# Patient Record
Sex: FEMALE | Race: WHITE | NOT HISPANIC OR LATINO | Employment: OTHER | URBAN - METROPOLITAN AREA
[De-identification: names, ages, dates, MRNs, and addresses within clinical notes are randomized per-mention and may not be internally consistent; named-entity substitution may affect disease eponyms.]

---

## 2018-08-27 ENCOUNTER — HOSPITAL ENCOUNTER (EMERGENCY)
Facility: MEDICAL CENTER | Age: 73
End: 2018-08-27
Attending: EMERGENCY MEDICINE
Payer: MEDICARE

## 2018-08-27 VITALS
SYSTOLIC BLOOD PRESSURE: 124 MMHG | BODY MASS INDEX: 31.5 KG/M2 | DIASTOLIC BLOOD PRESSURE: 52 MMHG | WEIGHT: 184.53 LBS | HEART RATE: 72 BPM | TEMPERATURE: 96.1 F | RESPIRATION RATE: 18 BRPM | OXYGEN SATURATION: 92 % | HEIGHT: 64 IN

## 2018-08-27 DIAGNOSIS — L03.116 CELLULITIS OF LEFT LOWER EXTREMITY: ICD-10-CM

## 2018-08-27 PROCEDURE — 700111 HCHG RX REV CODE 636 W/ 250 OVERRIDE (IP): Performed by: EMERGENCY MEDICINE

## 2018-08-27 PROCEDURE — 90715 TDAP VACCINE 7 YRS/> IM: CPT | Performed by: EMERGENCY MEDICINE

## 2018-08-27 PROCEDURE — 700102 HCHG RX REV CODE 250 W/ 637 OVERRIDE(OP): Performed by: EMERGENCY MEDICINE

## 2018-08-27 PROCEDURE — A9270 NON-COVERED ITEM OR SERVICE: HCPCS | Performed by: EMERGENCY MEDICINE

## 2018-08-27 PROCEDURE — 99284 EMERGENCY DEPT VISIT MOD MDM: CPT

## 2018-08-27 PROCEDURE — 90471 IMMUNIZATION ADMIN: CPT

## 2018-08-27 RX ORDER — AMOXICILLIN 500 MG/1
500 CAPSULE ORAL ONCE
Status: COMPLETED | OUTPATIENT
Start: 2018-08-27 | End: 2018-08-27

## 2018-08-27 RX ORDER — SULFAMETHOXAZOLE AND TRIMETHOPRIM 800; 160 MG/1; MG/1
2 TABLET ORAL ONCE
Status: COMPLETED | OUTPATIENT
Start: 2018-08-27 | End: 2018-08-27

## 2018-08-27 RX ORDER — AMOXICILLIN 500 MG/1
500 CAPSULE ORAL 3 TIMES DAILY
Qty: 21 CAP | Refills: 0 | Status: SHIPPED | OUTPATIENT
Start: 2018-08-27 | End: 2018-09-01

## 2018-08-27 RX ORDER — OMEPRAZOLE 20 MG/1
20 CAPSULE, DELAYED RELEASE ORAL DAILY
Status: SHIPPED | COMMUNITY
End: 2021-11-02 | Stop reason: SDUPTHER

## 2018-08-27 RX ORDER — SULFAMETHOXAZOLE AND TRIMETHOPRIM 800; 160 MG/1; MG/1
2 TABLET ORAL 2 TIMES DAILY
Qty: 14 TAB | Refills: 0 | Status: SHIPPED | OUTPATIENT
Start: 2018-08-27 | End: 2018-09-01

## 2018-08-27 RX ADMIN — AMOXICILLIN 500 MG: 500 CAPSULE ORAL at 13:06

## 2018-08-27 RX ADMIN — CLOSTRIDIUM TETANI TOXOID ANTIGEN (FORMALDEHYDE INACTIVATED), CORYNEBACTERIUM DIPHTHERIAE TOXOID ANTIGEN (FORMALDEHYDE INACTIVATED), BORDETELLA PERTUSSIS TOXOID ANTIGEN (GLUTARALDEHYDE INACTIVATED), BORDETELLA PERTUSSIS FILAMENTOUS HEMAGGLUTININ ANTIGEN (FORMALDEHYDE INACTIVATED), BORDETELLA PERTUSSIS PERTACTIN ANTIGEN, AND BORDETELLA PERTUSSIS FIMBRIAE 2/3 ANTIGEN 0.5 ML: 5; 2; 2.5; 5; 3; 5 INJECTION, SUSPENSION INTRAMUSCULAR at 13:06

## 2018-08-27 RX ADMIN — SULFAMETHOXAZOLE AND TRIMETHOPRIM 2 TABLET: 800; 160 TABLET ORAL at 13:06

## 2018-08-27 ASSESSMENT — LIFESTYLE VARIABLES: DO YOU DRINK ALCOHOL: NO

## 2018-08-27 NOTE — DISCHARGE INSTRUCTIONS
Cellulitis, Adult  Cellulitis is a skin infection. The infected area is usually red and tender. This condition occurs most often in the arms and lower legs. The infection can travel to the muscles, blood, and underlying tissue and become serious. It is very important to get treated for this condition.  What are the causes?  Cellulitis is caused by bacteria. The bacteria enter through a break in the skin, such as a cut, burn, insect bite, open sore, or crack.  What increases the risk?  This condition is more likely to occur in people who:  · Have a weak defense system (immune system).  · Have open wounds on the skin such as cuts, burns, bites, and scrapes. Bacteria can enter the body through these open wounds.  · Are older.  · Have diabetes.  · Have a type of long-lasting (chronic) liver disease (cirrhosis) or kidney disease.  · Use IV drugs.  What are the signs or symptoms?  Symptoms of this condition include:  · Redness, streaking, or spotting on the skin.  · Swollen area of the skin.  · Tenderness or pain when an area of the skin is touched.  · Warm skin.  · Fever.  · Chills.  · Blisters.  How is this diagnosed?  This condition is diagnosed based on a medical history and physical exam. You may also have tests, including:  · Blood tests.  · Lab tests.  · Imaging tests.  How is this treated?  Treatment for this condition may include:  · Medicines, such as antibiotic medicines or antihistamines.  · Supportive care, such as rest and application of cold or warm cloths (cold or warm compresses) to the skin.  · Hospital care, if the condition is severe.  The infection usually gets better within 1-2 days of treatment.  Follow these instructions at home:  · Take over-the-counter and prescription medicines only as told by your health care provider.  · If you were prescribed an antibiotic medicine, take it as told by your health care provider. Do not stop taking the antibiotic even if you start to feel better.  · Drink  enough fluid to keep your urine clear or pale yellow.  · Do not touch or rub the infected area.  · Raise (elevate) the infected area above the level of your heart while you are sitting or lying down.  · Apply warm or cold compresses to the affected area as told by your health care provider.  · Keep all follow-up visits as told by your health care provider. This is important. These visits let your health care provider make sure a more serious infection is not developing.  Contact a health care provider if:  · You have a fever.  · Your symptoms do not improve within 1-2 days of starting treatment.  · Your bone or joint underneath the infected area becomes painful after the skin has healed.  · Your infection returns in the same area or another area.  · You notice a swollen bump in the infected area.  · You develop new symptoms.  · You have a general ill feeling (malaise) with muscle aches and pains.  Get help right away if:  · Your symptoms get worse.  · You feel very sleepy.  · You develop vomiting or diarrhea that persists.  · You notice red streaks coming from the infected area.  · Your red area gets larger or turns dark in color.  This information is not intended to replace advice given to you by your health care provider. Make sure you discuss any questions you have with your health care provider.  Document Released: 09/27/2006 Document Revised: 04/27/2017 Document Reviewed: 10/26/2016  [employer] Interactive Patient Education © 2017 [employer]

## 2018-08-27 NOTE — ED NOTES
Pt ambulates to triage with  with c/c possible wound infection to left ankle.  Pt reports skin biopsy being done on Thursday.  A&ox4.  Pt to lobby & advised to inform RN of any changes.    *Pt here on vacation from [place].

## 2018-08-27 NOTE — ED TRIAGE NOTES
Ambulatory to room, agree with triage note. Visiting from [place], on Thursday she had lesion removed from left lateral ankle. She has ulcerated wound with some purulent material at edges with surrounding swelling and erythema. Does not extend proximally into leg. Denies fever/chills. Chart up for ERP.

## 2018-08-27 NOTE — ED NOTES
Verbalizes understanding of discharge and followup instructions. VSS. Given Rx's x2. Ambulates with steady gait to discharge.

## 2018-08-27 NOTE — ED PROVIDER NOTES
"ED Provider Note  CHIEF COMPLAINT  Chief Complaint   Patient presents with   • Wound Infection     possible wound infection, left ankle, skin biopsy done Thursday in [facility]  Jackie Johnson is a 73 y.o. female who presents stating that she had a squamous cell lesion biopsied and removed 4 days prior and since that she's had slight swelling and redness. Denies discharge from the wound as a neuro lateral aspect of her left ankle. She denies fever, shakes, chills, sweats, significant fever, or discharge. Her tetanus is is not current.    REVIEW OF SYSTEMS  Pertinent positives include slight swelling and redness the lateral aspect of her left lateral malleolus  Pertinent negatives include no discharge, no fever, no significant pain, no joint pain, no difficulty walking     PAST MEDICAL HISTORY  Past Medical History:   Diagnosis Date   • Dysrhythmia    • Hypertension        FAMILY HISTORY  History reviewed. No pertinent family history.    SOCIAL HISTORY  Social History     Social History   • Marital status:      Spouse name: N/A   • Number of children: N/A   • Years of education: N/A     Social History Main Topics   • Smoking status: Never Smoker   • Smokeless tobacco: Never Used   • Alcohol use Yes      Comment: occ   • Drug use: No   • Sexual activity: Not on file     Other Topics Concern   • Not on file     Social History Narrative   • No narrative on file       SURGICAL HISTORY  Past Surgical History:   Procedure Laterality Date   • OTHER ORTHOPEDIC SURGERY Bilateral     knee replacements       CURRENT MEDICATIONS  Home Medications     Reviewed by Sarbjit Pillai R.N. (Registered Nurse) on 08/27/18 at 1223  Med List Status: Partial   Medication Last Dose Status   ATENOLOL PO  Active   omeprazole (PRILOSEC) 20 MG delayed-release capsule  Active                ALLERGIES  Allergies   Allergen Reactions   • Other Drug      \"All narcotics make me nauseated\"       PHYSICAL EXAM  VITAL SIGNS: /52 " "  Pulse 72   Temp (!) 35.6 °C (96.1 °F)   Resp 18   Ht 1.626 m (5' 4\")   Wt 83.7 kg (184 lb 8.4 oz)   SpO2 92%   BMI 31.67 kg/m²      Constitutional: Well developed, Well nourished, No acute distress, Non-toxic appearance.   Eyes: PERRLA, EOMI, Conjunctiva normal, No discharge.   Skin: Warm,1 cm ulceration to the lateral malleolus region, no bony involvement, slight surrounding erythema and edema approximately 2 cm in diameter, no discharge, fluctuance, nontender  Back: No midline thoracic and lumbar spine tenderness, No CVA tenderness.   Extremities:  No edema,no obvious deformity, findings as above, distal pulses are brisk, excellent range of motion of the left ankle joint   Neurologic: Alert & oriented x 3, No focal deficits noted, either flexion dorsiflexion 5/5 left lower extremity        COURSE & MEDICAL DECISION MAKING  Pertinent Labs & Imaging studies reviewed. (See chart for details)  This is a 73-year-old female presents with status post biopsy of a squamous lesion on the left lateral ankle/malleolus. There is slight cellulitis or notes of abscess. Tetanus booster is updated, she received amoxicillin and Bactrim here in the emergency department prescription for the same. Strict return precautions have been given. Currently she does not have a surgical condition/lesion.    Discharge Medication List as of 8/27/2018  1:13 PM      START taking these medications    Details   amoxicillin (AMOXIL) 500 MG Cap Take 1 Cap by mouth 3 times a day for 5 days., Disp-21 Cap, R-0, Print Rx Paper      sulfamethoxazole-trimethoprim (BACTRIM DS) 800-160 MG tablet Take 2 Tabs by mouth 2 times a day for 5 days., Disp-14 Tab, R-0, Print Rx Paper               FINAL IMPRESSION     1. Cellulitis of left lower extremity        DISPOSITION:  Patient will be discharged home in stable condition.    FOLLOW UP:  [facility], Emergency Dept  [address] [place] [id number] [phone]    If symptoms " worsen      OUTPATIENT MEDICATIONS:  Discharge Medication List as of 8/27/2018  1:13 PM      START taking these medications    Details   amoxicillin (AMOXIL) 500 MG Cap Take 1 Cap by mouth 3 times a day for 5 days., Disp-21 Cap, R-0, Print Rx Paper      sulfamethoxazole-trimethoprim (BACTRIM DS) 800-160 MG tablet Take 2 Tabs by mouth 2 times a day for 5 days., Disp-14 Tab, R-0, Print Rx Paper                 Electronically signed by: John Thorpe, 8/27/2018 8:18 PM

## 2021-09-27 ENCOUNTER — OFFICE VISIT (OUTPATIENT)
Dept: MEDICAL GROUP | Facility: LAB | Age: 76
End: 2021-09-27
Payer: COMMERCIAL

## 2021-09-27 VITALS
WEIGHT: 179.68 LBS | HEART RATE: 60 BPM | SYSTOLIC BLOOD PRESSURE: 118 MMHG | HEIGHT: 64 IN | BODY MASS INDEX: 30.67 KG/M2 | DIASTOLIC BLOOD PRESSURE: 66 MMHG | TEMPERATURE: 97 F | OXYGEN SATURATION: 95 %

## 2021-09-27 DIAGNOSIS — Z76.89 ENCOUNTER TO ESTABLISH CARE: ICD-10-CM

## 2021-09-27 DIAGNOSIS — F33.0 MILD EPISODE OF RECURRENT MAJOR DEPRESSIVE DISORDER (HCC): ICD-10-CM

## 2021-09-27 DIAGNOSIS — Z12.31 ENCOUNTER FOR SCREENING MAMMOGRAM FOR BREAST CANCER: ICD-10-CM

## 2021-09-27 PROBLEM — Z85.828 HISTORY OF NONMELANOMA SKIN CANCER: Status: RESOLVED | Noted: 2018-03-21 | Resolved: 2021-09-27

## 2021-09-27 PROBLEM — E78.5 HYPERLIPIDEMIA: Status: ACTIVE | Noted: 2018-05-15

## 2021-09-27 PROBLEM — Z85.828 HISTORY OF NONMELANOMA SKIN CANCER: Status: ACTIVE | Noted: 2018-03-21

## 2021-09-27 PROBLEM — I10 ESSENTIAL HYPERTENSION: Status: ACTIVE | Noted: 2020-01-31

## 2021-09-27 PROBLEM — D80.1 HYPOGAMMAGLOBULINEMIA (HCC): Status: ACTIVE | Noted: 2017-08-01

## 2021-09-27 PROCEDURE — 96127 BRIEF EMOTIONAL/BEHAV ASSMT: CPT | Performed by: FAMILY MEDICINE

## 2021-09-27 PROCEDURE — 99204 OFFICE O/P NEW MOD 45 MIN: CPT | Mod: 25 | Performed by: FAMILY MEDICINE

## 2021-09-27 RX ORDER — SIMVASTATIN 40 MG
40 TABLET ORAL NIGHTLY
COMMUNITY
End: 2021-11-02 | Stop reason: SDUPTHER

## 2021-09-27 RX ORDER — ALBUTEROL SULFATE 90 UG/1
AEROSOL, METERED RESPIRATORY (INHALATION)
COMMUNITY
Start: 2021-05-13 | End: 2021-11-02 | Stop reason: SDUPTHER

## 2021-09-27 RX ORDER — FLUCONAZOLE 50 MG/1
50 TABLET ORAL DAILY
COMMUNITY
End: 2021-11-02 | Stop reason: SDUPTHER

## 2021-09-27 RX ORDER — SERTRALINE HYDROCHLORIDE 100 MG/1
100 TABLET, FILM COATED ORAL DAILY
Qty: 30 TABLET | Refills: 11 | Status: SHIPPED | OUTPATIENT
Start: 2021-09-27 | End: 2021-11-02 | Stop reason: SDUPTHER

## 2021-09-27 ASSESSMENT — PATIENT HEALTH QUESTIONNAIRE - PHQ9
5. POOR APPETITE OR OVEREATING: 0 - NOT AT ALL
SUM OF ALL RESPONSES TO PHQ QUESTIONS 1-9: 4
CLINICAL INTERPRETATION OF PHQ2 SCORE: 2

## 2021-09-27 NOTE — PROGRESS NOTES
Jackie Johnson is a 76 y.o. female here for   Chief Complaint   Patient presents with   • Establish Care   • Medication Refill   • Toe Pain       HPI:  Jackie is a very pleasant 76 y.o. female.     #Establish care   -Reviewed all past medical history, family history, social history.  -Reviewed all screening/vaccinations: due for mammogram   -Diet and Exercise: appropriate for age  -Tobacco, alcohol, recreational drug use: see chart.   -Sexually active: See chart.     #Depression:  Chronic longstanding condition at progression currently treating with sertraline 100 mg daily.  Patient said that she is feeling well, medications working well with no concerns.  Denies any side effects peer denies any suicidal/homicidal ideations.  Requesting ref referral to counseling at this time.      Current medicines (including changes today)  Current Outpatient Medications   Medication Sig Dispense Refill   • sertraline (ZOLOFT) 50 MG Tab Take 50 mg by mouth every day.     • simvastatin (ZOCOR) 40 MG Tab Take 40 mg by mouth every evening.     • fluconazole (DIFLUCAN) 50 MG Tab Take 50 mg by mouth every day.     • NS SOLN 60 mL with albuterol 2.5 mg/0.5 mL NEBU 100 mg Take  by nebulization.     • ATENOLOL PO Take  by mouth.     • omeprazole (PRILOSEC) 20 MG delayed-release capsule Take 20 mg by mouth every day.       No current facility-administered medications for this visit.     She  has a past medical history of Dysrhythmia and Hypertension.  She  has a past surgical history that includes other orthopedic surgery (Bilateral).  Social History     Tobacco Use   • Smoking status: Never Smoker   • Smokeless tobacco: Never Used   Substance Use Topics   • Alcohol use: Yes     Comment: occ   • Drug use: No     Social History     Social History Narrative   • Not on file     No family history on file.  No family status information on file.       ROS  Review of Systems   Constitutional: Negative for chills and fever.   Respiratory:  "Negative for shortness of breath and wheezing.    Cardiovascular: Negative for chest pain and palpitations.   Gastrointestinal: Negative for abdominal pain, diarrhea, nausea and vomiting.   Neurological: Negative for dizziness and headaches.   Psychiatric/Behavioral: Negative for depression. The patient is not nervous/anxious.         Objective:     /66 (BP Location: Left arm, Patient Position: Sitting, BP Cuff Size: Adult)   Pulse 60   Temp 36.1 °C (97 °F) (Temporal)   Ht 1.626 m (5' 4\")   Wt 81.5 kg (179 lb 10.8 oz)   SpO2 95%  Body mass index is 30.84 kg/m².  Physical Exam:    Constitutional: Alert, no distress.  Skin: Warm, dry, good turgor, no rashes in visible areas.  Eye: Equal, round and reactive, conjunctiva clear, lids normal.  ENMT: Lips without lesions, good dentition, oropharynx clear. TM's pearly gray with normal light reflexes bilaterally  Neck: Trachea midline, no masses, no thyromegaly. No cervical or supraclavicular lymphadenopathy.  Respiratory: Unlabored respiratory effort, lungs clear to auscultation bilaterally, no wheezes, rales, or ronchi.  Cardiovascular: Normal S1, S2, RRR, no murmur, no edema.  Abdomen: Soft, non-tender, no masses, no hepatosplenomegaly.  Psych: Alert and oriented x3, normal affect and mood.      Depression Screening    Little interest or pleasure in doing things?  0 - not at all   Feeling down, depressed , or hopeless? 2 - more than half the days   Trouble falling or staying asleep, or sleeping too much?  0 - not at all   Feeling tired or having little energy?  1 - several days   Poor appetite or overeating?  0 - not at all   Feeling bad about yourself - or that you are a failure or have let yourself or your family down? 1 - several days   Trouble concentrating on things, such as reading the newspaper or watching television? 0 - not at all   Moving or speaking so slowly that other people could have noticed.  Or the opposite - being so fidgety or restless that " you have been moving around a lot more than usual?  0 - not at all   Thoughts that you would be better off dead, or of hurting yourself?  0 - not at all   Patient Health Questionnaire Score: 4       Assessment and Plan:   The following treatment plan was discussed    1. Encounter to establish care  -All questions concerns were answered at this time.  -Vaccinations/screenings needed at this time: We will order mammogram at this time.  -Labs reviewed, no concerns  -Discussed the importance of a healthy, Mediterranean style diet, routine exercise regimen.  -Discussed general safety measures including seatbelts, helmets, avoidance of smoking, sunscreen, hydration.  -Follow-up for general physical exam on a yearly basis, sooner if needed.      2. Mild episode of recurrent major depressive disorder (HCC)  -Stable.  PHQ-9 currently at 4.  We will continue with sertraline.  We will also refer to counseling.  - REFERRAL TO PSYCHOLOGY    3. Encounter for screening mammogram for breast cancer  - MA-SCREENING MAMMO BILAT W/CAD; Future      Records requested.  Followup: No follow-ups on file.         This note was created using voice recognition software. I have made every reasonable attempt to correct errors, however, I do anticipate some grammatical errors.

## 2021-10-04 ENCOUNTER — NON-PROVIDER VISIT (OUTPATIENT)
Dept: MEDICAL GROUP | Facility: LAB | Age: 76
End: 2021-10-04
Payer: MEDICARE

## 2021-10-04 DIAGNOSIS — Z23 NEED FOR VACCINATION: ICD-10-CM

## 2021-10-04 PROCEDURE — 90471 IMMUNIZATION ADMIN: CPT | Performed by: FAMILY MEDICINE

## 2021-10-04 PROCEDURE — 90662 IIV NO PRSV INCREASED AG IM: CPT | Performed by: FAMILY MEDICINE

## 2021-10-27 ENCOUNTER — OFFICE VISIT (OUTPATIENT)
Dept: MEDICAL GROUP | Facility: LAB | Age: 76
End: 2021-10-27
Payer: MEDICARE

## 2021-10-27 ENCOUNTER — HOSPITAL ENCOUNTER (OUTPATIENT)
Dept: LAB | Facility: MEDICAL CENTER | Age: 76
End: 2021-10-27
Attending: NURSE PRACTITIONER
Payer: MEDICARE

## 2021-10-27 VITALS
BODY MASS INDEX: 30.7 KG/M2 | RESPIRATION RATE: 14 BRPM | TEMPERATURE: 98 F | OXYGEN SATURATION: 93 % | DIASTOLIC BLOOD PRESSURE: 58 MMHG | WEIGHT: 179.8 LBS | HEIGHT: 64 IN | SYSTOLIC BLOOD PRESSURE: 104 MMHG | HEART RATE: 58 BPM

## 2021-10-27 DIAGNOSIS — L65.9 HAIR LOSS: ICD-10-CM

## 2021-10-27 LAB
25(OH)D3 SERPL-MCNC: 42 NG/ML (ref 30–100)
ALBUMIN SERPL BCP-MCNC: 4.3 G/DL (ref 3.2–4.9)
ALBUMIN/GLOB SERPL: 1.9 G/DL
ALP SERPL-CCNC: 65 U/L (ref 30–99)
ALT SERPL-CCNC: 14 U/L (ref 2–50)
ANION GAP SERPL CALC-SCNC: 9 MMOL/L (ref 7–16)
AST SERPL-CCNC: 16 U/L (ref 12–45)
BILIRUB SERPL-MCNC: 0.4 MG/DL (ref 0.1–1.5)
BUN SERPL-MCNC: 14 MG/DL (ref 8–22)
CALCIUM SERPL-MCNC: 9.1 MG/DL (ref 8.5–10.5)
CHLORIDE SERPL-SCNC: 109 MMOL/L (ref 96–112)
CHOLEST SERPL-MCNC: 154 MG/DL (ref 100–199)
CO2 SERPL-SCNC: 26 MMOL/L (ref 20–33)
CREAT SERPL-MCNC: 0.49 MG/DL (ref 0.5–1.4)
ERYTHROCYTE [DISTWIDTH] IN BLOOD BY AUTOMATED COUNT: 47.2 FL (ref 35.9–50)
EST. AVERAGE GLUCOSE BLD GHB EST-MCNC: 108 MG/DL
FASTING STATUS PATIENT QL REPORTED: NORMAL
GLOBULIN SER CALC-MCNC: 2.3 G/DL (ref 1.9–3.5)
GLUCOSE SERPL-MCNC: 94 MG/DL (ref 65–99)
HBA1C MFR BLD: 5.4 % (ref 4–5.6)
HCT VFR BLD AUTO: 43.6 % (ref 37–47)
HDLC SERPL-MCNC: 54 MG/DL
HGB BLD-MCNC: 14.5 G/DL (ref 12–16)
LDLC SERPL CALC-MCNC: 84 MG/DL
MCH RBC QN AUTO: 31.5 PG (ref 27–33)
MCHC RBC AUTO-ENTMCNC: 33.3 G/DL (ref 33.6–35)
MCV RBC AUTO: 94.6 FL (ref 81.4–97.8)
PLATELET # BLD AUTO: 226 K/UL (ref 164–446)
PMV BLD AUTO: 10.8 FL (ref 9–12.9)
POTASSIUM SERPL-SCNC: 4.6 MMOL/L (ref 3.6–5.5)
PROT SERPL-MCNC: 6.6 G/DL (ref 6–8.2)
RBC # BLD AUTO: 4.61 M/UL (ref 4.2–5.4)
SODIUM SERPL-SCNC: 144 MMOL/L (ref 135–145)
TRIGL SERPL-MCNC: 79 MG/DL (ref 0–149)
TSH SERPL DL<=0.005 MIU/L-ACNC: 2.92 UIU/ML (ref 0.38–5.33)
WBC # BLD AUTO: 6 K/UL (ref 4.8–10.8)

## 2021-10-27 PROCEDURE — 36415 COLL VENOUS BLD VENIPUNCTURE: CPT

## 2021-10-27 PROCEDURE — 83036 HEMOGLOBIN GLYCOSYLATED A1C: CPT

## 2021-10-27 PROCEDURE — 80061 LIPID PANEL: CPT

## 2021-10-27 PROCEDURE — 82626 DEHYDROEPIANDROSTERONE: CPT

## 2021-10-27 PROCEDURE — 85027 COMPLETE CBC AUTOMATED: CPT

## 2021-10-27 PROCEDURE — 84443 ASSAY THYROID STIM HORMONE: CPT

## 2021-10-27 PROCEDURE — 80053 COMPREHEN METABOLIC PANEL: CPT

## 2021-10-27 PROCEDURE — 99213 OFFICE O/P EST LOW 20 MIN: CPT | Performed by: NURSE PRACTITIONER

## 2021-10-27 PROCEDURE — 82306 VITAMIN D 25 HYDROXY: CPT

## 2021-10-27 NOTE — ASSESSMENT & PLAN NOTE
Patient reports that she has been experiencing more hair loss in the last 1.5 months. She just moved to [place] in May, and reports that the move was extremely stressful, as her  has Alzheimer's and she was in charge of everything. She thought that it was just stress, but her hair loss has continued getting worse even since the move. She does report some dandruff and itchy scalp. She is also starting therapy next week to help deal with the stress. She would like to have lab work done at this time. Denies palpitations, cold intolerance, weight changes.

## 2021-10-27 NOTE — PROGRESS NOTES
"Subjective:     CC: The encounter diagnosis was Hair loss.    HPI:   Jackie presents today with the following:    Hair loss  Patient reports that she has been experiencing more hair loss in the last 1.5 months. She just moved to [place] in May, and reports that the move was extremely stressful, as her  has Alzheimer's and she was in charge of everything. She thought that it was just stress, but her hair loss has continued getting worse even since the move. She does report some dandruff and itchy scalp. She is also starting therapy next week to help deal with the stress. She would like to have lab work done at this time. Denies palpitations, cold intolerance, weight changes.     Current Outpatient Medications Ordered in Epic   Medication Sig Dispense Refill   • simvastatin (ZOCOR) 40 MG Tab Take 40 mg by mouth every evening.     • fluconazole (DIFLUCAN) 50 MG Tab Take 50 mg by mouth every day.     • sertraline (ZOLOFT) 100 MG Tab Take 1 Tablet by mouth every day. 30 Tablet 11   • albuterol 108 (90 Base) MCG/ACT Aero Soln inhalation aerosol Inhale 1 to 2 puffs by mouth every 4 to 6 hours as needed for wheezing and cough     • ATENOLOL PO Take  by mouth.     • omeprazole (PRILOSEC) 20 MG delayed-release capsule Take 20 mg by mouth every day.       No current [facility]-ordered facility-administered medications on file.     ROS:   Gen: no fevers/chills, no changes in weight  Eyes: no changes in vision  ENT: no sore throat, no hearing loss, no bloody nose  Pulm: no sob, no cough  CV: no chest pain, no palpitations  GI: no nausea/vomiting, no diarrhea  : no dysuria  MSk: no myalgias  Skin: no rash  Neuro: no headaches, no numbness/tingling  Heme/Lymph: no easy bruising        - NOTE: All other systems reviewed and are negative, except as in HPI.    Objective:     Exam: /58 (BP Location: Right arm, Patient Position: Sitting, BP Cuff Size: Adult)   Pulse (!) 58   Temp 36.7 °C (98 °F)   Resp 14   Ht 1.626 m (5' 4\") "   Wt 81.6 kg (179 lb 12.8 oz)   SpO2 93%  Body mass index is 30.86 kg/m².    General: Normal appearing. No distress.  HEENT: Normocephalic. Eyes conjunctiva clear lids without ptosis, pupils equal and reactive to light accommodation, ears normal shape and contour, canals are clear bilaterally, tympanic membranes are benign, nasal mucosa benign, oropharynx is without erythema, edema or exudates.   Neck: Supple without JVD or bruit. Thyroid is not enlarged.  Pulmonary: Clear to ausculation.  Normal effort. No rales, ronchi, or wheezing.  Cardiovascular: Regular rate and rhythm without murmur. Carotid and radial pulses are intact and equal bilaterally.  Abdomen: Soft, nontender, nondistended. Normal bowel sounds. Liver and spleen are not palpable  Neurologic: Grossly nonfocal  Lymph: No cervical or supraclavicular lymph nodes are palpable  Skin: Warm and dry.  Seborrheic dermatitis on scalp.  Musculoskeletal: Normal gait. No extremity cyanosis, clubbing, or edema.  Psych: Normal mood and affect. Alert and oriented x3. Judgment and insight is normal.    Assessment & Plan:     76 y.o. female with the following -     1. Hair loss  Labs ordered. Discussed OTC medicated shampoos. Discussed referral to dermatology and/or psychology. Supportive care, differential diagnoses, and indications for immediate follow-up discussed with patient. Instructed to return to clinic for any change in condition, further concerns, or worsening of symptoms.  - Comp Metabolic Panel; Future  - HEMOGLOBIN A1C; Future  - TSH WITH REFLEX TO FT4; Future  - VITAMIN D,25 HYDROXY; Future  - DHEA; Future  - CBC WITHOUT DIFFERENTIAL; Future  - Lipid Profile; Future    Return if symptoms worsen or fail to improve.    Please note that this dictation was created using voice recognition software. I have made every reasonable attempt to correct obvious errors, but I expect that there are errors of grammar and possibly content that I did not discover before  finalizing the note.

## 2021-10-31 LAB — DHEA SERPL-MCNC: 1.14 NG/ML (ref 0.63–4.7)

## 2021-11-02 DIAGNOSIS — F33.0 MILD EPISODE OF RECURRENT MAJOR DEPRESSIVE DISORDER (HCC): ICD-10-CM

## 2021-11-03 DIAGNOSIS — F33.0 MILD EPISODE OF RECURRENT MAJOR DEPRESSIVE DISORDER (HCC): ICD-10-CM

## 2021-11-03 RX ORDER — ATENOLOL 25 MG/1
25 TABLET ORAL DAILY
Qty: 90 TABLET | Refills: 3 | Status: SHIPPED
Start: 2021-11-03 | End: 2022-01-07

## 2021-11-03 RX ORDER — OMEPRAZOLE 20 MG/1
20 CAPSULE, DELAYED RELEASE ORAL DAILY
Qty: 90 CAPSULE | Refills: 3 | Status: SHIPPED | OUTPATIENT
Start: 2021-11-03 | End: 2022-01-03 | Stop reason: SDUPTHER

## 2021-11-03 RX ORDER — SIMVASTATIN 40 MG
40 TABLET ORAL EVERY EVENING
Qty: 90 TABLET | Refills: 3 | Status: SHIPPED | OUTPATIENT
Start: 2021-11-03 | End: 2021-11-03 | Stop reason: SDUPTHER

## 2021-11-03 RX ORDER — FLUCONAZOLE 50 MG/1
50 TABLET ORAL DAILY
Qty: 90 TABLET | Refills: 3 | Status: SHIPPED
Start: 2021-11-03 | End: 2022-07-11

## 2021-11-03 RX ORDER — SERTRALINE HYDROCHLORIDE 100 MG/1
100 TABLET, FILM COATED ORAL DAILY
Qty: 90 TABLET | Refills: 3 | Status: SHIPPED | OUTPATIENT
Start: 2021-11-03 | End: 2021-11-03 | Stop reason: SDUPTHER

## 2021-11-03 RX ORDER — ATENOLOL 25 MG/1
25 TABLET ORAL DAILY
Qty: 90 TABLET | Refills: 3 | Status: SHIPPED | OUTPATIENT
Start: 2021-11-03 | End: 2021-11-03 | Stop reason: SDUPTHER

## 2021-11-03 RX ORDER — FLUCONAZOLE 50 MG/1
50 TABLET ORAL DAILY
Qty: 90 TABLET | Refills: 3 | Status: SHIPPED | OUTPATIENT
Start: 2021-11-03 | End: 2021-11-03 | Stop reason: SDUPTHER

## 2021-11-03 RX ORDER — OMEPRAZOLE 20 MG/1
20 CAPSULE, DELAYED RELEASE ORAL DAILY
Qty: 90 CAPSULE | Refills: 3 | Status: SHIPPED | OUTPATIENT
Start: 2021-11-03 | End: 2021-11-03 | Stop reason: SDUPTHER

## 2021-11-03 RX ORDER — SIMVASTATIN 40 MG
40 TABLET ORAL EVERY EVENING
Qty: 90 TABLET | Refills: 3 | Status: SHIPPED | OUTPATIENT
Start: 2021-11-03 | End: 2022-08-18 | Stop reason: SDUPTHER

## 2021-11-03 RX ORDER — ALBUTEROL SULFATE 90 UG/1
AEROSOL, METERED RESPIRATORY (INHALATION)
Qty: 8.5 G | Refills: 2 | Status: SHIPPED | OUTPATIENT
Start: 2021-11-03 | End: 2021-11-03 | Stop reason: SDUPTHER

## 2021-11-03 RX ORDER — SERTRALINE HYDROCHLORIDE 100 MG/1
100 TABLET, FILM COATED ORAL DAILY
Qty: 90 TABLET | Refills: 3 | Status: SHIPPED | OUTPATIENT
Start: 2021-11-03 | End: 2022-05-09 | Stop reason: SDUPTHER

## 2021-11-03 RX ORDER — ALBUTEROL SULFATE 90 UG/1
AEROSOL, METERED RESPIRATORY (INHALATION)
Qty: 8.5 G | Refills: 2 | Status: SHIPPED | OUTPATIENT
Start: 2021-11-03 | End: 2022-08-18 | Stop reason: SDUPTHER

## 2021-11-03 NOTE — TELEPHONE ENCOUNTER
Received request via: Patient    Was the patient seen in the last year in this department? Yes    Does the patient have an active prescription (recently filled or refills available) for medication(s) requested? No     Patient requesting [facility]

## 2021-11-12 RX ORDER — ATENOLOL 50 MG/1
50 TABLET ORAL DAILY
COMMUNITY
End: 2022-01-07 | Stop reason: SDUPTHER

## 2021-11-12 NOTE — TELEPHONE ENCOUNTER
Patient report taking Atenolol 50mg daily. She will take 2 tablets of her 25mg until needing another refill.

## 2021-11-16 ENCOUNTER — TELEPHONE (OUTPATIENT)
Dept: MEDICAL GROUP | Facility: LAB | Age: 76
End: 2021-11-16

## 2022-01-03 RX ORDER — OMEPRAZOLE 20 MG/1
20 CAPSULE, DELAYED RELEASE ORAL DAILY
Qty: 90 CAPSULE | Refills: 3 | Status: SHIPPED
Start: 2022-01-03 | End: 2022-07-11

## 2022-01-03 NOTE — TELEPHONE ENCOUNTER
Received request via: Patient    Was the patient seen in the last year in this department? Yes  LOV 10/27/2021  Does the patient have an active prescription (recently filled or refills available) for medication(s) requested? No

## 2022-01-07 RX ORDER — ATENOLOL 50 MG/1
50 TABLET ORAL DAILY
Qty: 90 TABLET | Refills: 3 | Status: SHIPPED | OUTPATIENT
Start: 2022-01-07 | End: 2022-04-10 | Stop reason: SDUPTHER

## 2022-01-26 ENCOUNTER — OFFICE VISIT (OUTPATIENT)
Dept: MEDICAL GROUP | Facility: LAB | Age: 77
End: 2022-01-26
Payer: MEDICARE

## 2022-01-26 VITALS
HEIGHT: 64 IN | OXYGEN SATURATION: 96 % | BODY MASS INDEX: 31.07 KG/M2 | DIASTOLIC BLOOD PRESSURE: 62 MMHG | WEIGHT: 182 LBS | RESPIRATION RATE: 15 BRPM | TEMPERATURE: 97.8 F | SYSTOLIC BLOOD PRESSURE: 116 MMHG | HEART RATE: 88 BPM

## 2022-01-26 DIAGNOSIS — Z12.11 SPECIAL SCREENING FOR MALIGNANT NEOPLASMS, COLON: ICD-10-CM

## 2022-01-26 DIAGNOSIS — H69.91 EUSTACHIAN TUBE DYSFUNCTION, RIGHT: ICD-10-CM

## 2022-01-26 PROCEDURE — 99214 OFFICE O/P EST MOD 30 MIN: CPT | Performed by: FAMILY MEDICINE

## 2022-01-26 ASSESSMENT — ENCOUNTER SYMPTOMS
SHORTNESS OF BREATH: 0
FEVER: 0
PALPITATIONS: 0
CONSTIPATION: 0
DIARRHEA: 0
VOMITING: 0
ABDOMINAL PAIN: 0
NAUSEA: 0
CHILLS: 0
WHEEZING: 0

## 2022-01-26 ASSESSMENT — FIBROSIS 4 INDEX: FIB4 SCORE: 1.4

## 2022-01-26 ASSESSMENT — PATIENT HEALTH QUESTIONNAIRE - PHQ9: CLINICAL INTERPRETATION OF PHQ2 SCORE: 0

## 2022-01-26 NOTE — PROGRESS NOTES
"Subjective:   Jackie Johnson is a 76 y.o. female here today for   Chief Complaint   Patient presents with   • Vertigo     X on and off, inner ear pain,      #Vertigo   -This has been a chronic, ongoing problem for patient.  He she states that several years ago she had episode of vertigo that was evaluated by previous primary care provider.  She was given \"at home exercises\" which helped some of these symptoms.  She states recently they have restarted.  She states that she has episodes of dizziness that last only seconds when bending down, standing up from bed, or turning head really fast.  She denies any other associated symptoms.  Denies any lightheadedness, syncope or presyncopal episodes.  Patient has recently traveled and states that since then she is felt \"pressure\" as well as a slight pain in her right ear.  She is here today for further evaluation.      Allergies   Allergen Reactions   • Erythromycin Nausea   • Hydrocodone-Acetaminophen    • Mastisol Adhesive [Mastisol] Rash   • Meperidine Hcl Unspecified     [08/08/09 UNKNOWN, PLEASE VERIFY]   • Morphine    • Other Drug      \"All narcotics make me nauseated\"         Current medicines (including changes today)  Current Outpatient Medications   Medication Sig Dispense Refill   • atenolol (TENORMIN) 50 MG Tab Take 1 Tablet by mouth every day for 90 days. 90 Tablet 3   • omeprazole (PRILOSEC) 20 MG delayed-release capsule Take 1 Capsule by mouth every day for 360 days. 90 Capsule 3   • simvastatin (ZOCOR) 40 MG Tab Take 1 Tablet by mouth every evening for 360 days. 90 Tablet 3   • fluconazole (DIFLUCAN) 50 MG Tab Take 1 Tablet by mouth every day for 360 days. 90 Tablet 3   • sertraline (ZOLOFT) 100 MG Tab Take 1 Tablet by mouth every day for 360 days. 90 Tablet 3   • albuterol 108 (90 Base) MCG/ACT Aero Soln inhalation aerosol Inhale 1 to 2 puffs by mouth every 4 to 6 hours as needed for wheezing and cough 8.5 g 2     No current facility-administered " "medications for this visit.     She  has a past medical history of Allergy, Anxiety, Depression, Dysrhythmia, History of nonmelanoma skin cancer (3/21/2018), Hypertension, and Vaginal Papanicolaou smear not required due to history of hysterectomy (2/28/2013).    ROS   Review of Systems   Constitutional: Negative for chills and fever.   Respiratory: Negative for shortness of breath and wheezing.    Cardiovascular: Negative for palpitations.   Gastrointestinal: Negative for abdominal pain, constipation, diarrhea, nausea and vomiting.      Objective:     Physical Exam:  /62   Pulse 88   Temp 36.6 °C (97.8 °F) (Temporal)   Resp 15   Ht 1.626 m (5' 4.02\")   Wt 82.6 kg (182 lb)   SpO2 96%  Body mass index is 31.22 kg/m².   Constitutional: Alert, no distress.  Skin: Warm, dry, good turgor, no rashes in visible areas.  ENT:  -TMs observed, slightly retracted, slightly erythematous with air-fluid interface on the right ear.  Left ear TM clear, pearly, normal light reflex.  Eye: Equal, round and reactive, conjunctiva clear, lids normal.  Respiratory: Unlabored respiratory effort, lungs clear to auscultation, no wheezes, no rhonchi.  Cardiovascular: Normal S1, S2, no murmur, no edema.  Abdomen: Soft, non-tender, no masses, no hepatosplenomegaly.  Psych: Alert and oriented x3, normal affect and mood.  Neuro: Cranial nerves I through XII are intact, no gross motor/sensory deficits.    Assessment and Plan:     1. Eustachian tube dysfunction, right  -At this time vertigo differential diagnosis does include possible BPPV; however, there is also concerns of symptoms due to eustachian tube dysfunction given retracted TM seen on right side.  We will begin treatment this time with Flonase.  I do encourage patient to continue with Epley maneuver at home.  If symptoms do not improve within 1 to 2 weeks she is to follow-up at that time for further evaluation.    2. Special screening for malignant neoplasms, colon  - Referral " to GI for Colonoscopy    My total time spent caring for the patient on the day of the encounter was 30 minutes.       Followup: No follow-ups on file.         PLEASE NOTE: This dictation was created using voice recognition software. I have made every reasonable attempt to correct obvious errors, but I expect that there are errors of grammar and possibly content that I did not discover before finalizing the note.

## 2022-01-28 ENCOUNTER — TELEPHONE (OUTPATIENT)
Dept: MEDICAL GROUP | Facility: LAB | Age: 77
End: 2022-01-28

## 2022-01-28 NOTE — TELEPHONE ENCOUNTER
Pt was seen this week, she is doing her exercises for vertigo. Asking if she should be take amoxicillin. Or any other medication, she is not doing any better.   She does have amoxicillin at home for her dental procedures.

## 2022-01-29 NOTE — TELEPHONE ENCOUNTER
From what I said an appointment this is no infectious etiology and antibiotics would not help.  Can take up to 1 week to start working well.  One thing she can do is start taking a decongestant like Sudafed which could help in the short-term while the Sudafed helps decrease inflammation.

## 2022-02-08 ASSESSMENT — ENCOUNTER SYMPTOMS
FEVER: 0
VOMITING: 0
SHORTNESS OF BREATH: 0
DIZZINESS: 0
HEADACHES: 0
NAUSEA: 0
ABDOMINAL PAIN: 0
WHEEZING: 0
PALPITATIONS: 0
DIARRHEA: 0
CHILLS: 0
DEPRESSION: 0
NERVOUS/ANXIOUS: 0

## 2022-02-23 ENCOUNTER — TELEPHONE (OUTPATIENT)
Dept: MEDICAL GROUP | Facility: LAB | Age: 77
End: 2022-02-23
Payer: MEDICARE

## 2022-02-23 DIAGNOSIS — F33.0 MILD EPISODE OF RECURRENT MAJOR DEPRESSIVE DISORDER (HCC): ICD-10-CM

## 2022-02-23 DIAGNOSIS — R42 DIZZINESS: ICD-10-CM

## 2022-02-24 NOTE — TELEPHONE ENCOUNTER
1. Caller Name: Jackie Johnson      Call Back Number: [phone] (home)         How would the patient prefer to be contacted with a response: Phone call do NOT leave a detailed message    Patient called and spoke to me. Requesting ENT referral for dizziness.   (Dr.Houser Evans [facility])

## 2022-04-11 RX ORDER — ATENOLOL 50 MG/1
50 TABLET ORAL DAILY
Qty: 90 TABLET | Refills: 3 | Status: SHIPPED | OUTPATIENT
Start: 2022-04-11 | End: 2022-07-13 | Stop reason: SDUPTHER

## 2022-05-09 DIAGNOSIS — F33.0 MILD EPISODE OF RECURRENT MAJOR DEPRESSIVE DISORDER (HCC): ICD-10-CM

## 2022-05-09 RX ORDER — SERTRALINE HYDROCHLORIDE 100 MG/1
100 TABLET, FILM COATED ORAL DAILY
Qty: 90 TABLET | Refills: 1 | Status: SHIPPED | OUTPATIENT
Start: 2022-05-09 | End: 2022-08-18 | Stop reason: SDUPTHER

## 2022-05-20 ENCOUNTER — TELEPHONE (OUTPATIENT)
Dept: MEDICAL GROUP | Facility: LAB | Age: 77
End: 2022-05-20
Payer: COMMERCIAL

## 2022-05-20 NOTE — TELEPHONE ENCOUNTER
Patient called and LVM inquiring AWV, Patient is due and needed to wait until 05/17/2022 as she had one from [place] in 2021.   Called patient back and LVM detailed regarding needing to schedule a annual wellness visit. It was never completed back in january that is why patient received bill.

## 2022-05-26 ENCOUNTER — APPOINTMENT (OUTPATIENT)
Dept: MEDICAL GROUP | Facility: LAB | Age: 77
End: 2022-05-26
Payer: COMMERCIAL

## 2022-07-11 ENCOUNTER — APPOINTMENT (OUTPATIENT)
Dept: RADIOLOGY | Facility: MEDICAL CENTER | Age: 77
End: 2022-07-11
Attending: EMERGENCY MEDICINE
Payer: MEDICARE

## 2022-07-11 ENCOUNTER — HOSPITAL ENCOUNTER (OUTPATIENT)
Facility: MEDICAL CENTER | Age: 77
End: 2022-07-12
Attending: EMERGENCY MEDICINE | Admitting: FAMILY MEDICINE
Payer: MEDICARE

## 2022-07-11 ENCOUNTER — APPOINTMENT (OUTPATIENT)
Dept: CARDIOLOGY | Facility: MEDICAL CENTER | Age: 77
End: 2022-07-11
Attending: FAMILY MEDICINE
Payer: MEDICARE

## 2022-07-11 DIAGNOSIS — R07.2 PRECORDIAL CHEST PAIN: ICD-10-CM

## 2022-07-11 DIAGNOSIS — K21.9 GASTROESOPHAGEAL REFLUX DISEASE, UNSPECIFIED WHETHER ESOPHAGITIS PRESENT: ICD-10-CM

## 2022-07-11 PROBLEM — J45.909 ASTHMA: Status: ACTIVE | Noted: 2022-07-11

## 2022-07-11 PROBLEM — R07.89 CHEST PRESSURE: Status: ACTIVE | Noted: 2022-07-11

## 2022-07-11 LAB
ALBUMIN SERPL BCP-MCNC: 4.1 G/DL (ref 3.2–4.9)
ALBUMIN/GLOB SERPL: 1.6 G/DL
ALP SERPL-CCNC: 69 U/L (ref 30–99)
ALT SERPL-CCNC: 13 U/L (ref 2–50)
ANION GAP SERPL CALC-SCNC: 12 MMOL/L (ref 7–16)
AST SERPL-CCNC: 16 U/L (ref 12–45)
BASOPHILS # BLD AUTO: 1.1 % (ref 0–1.8)
BASOPHILS # BLD: 0.06 K/UL (ref 0–0.12)
BILIRUB SERPL-MCNC: 0.3 MG/DL (ref 0.1–1.5)
BUN SERPL-MCNC: 14 MG/DL (ref 8–22)
CALCIUM SERPL-MCNC: 8.8 MG/DL (ref 8.4–10.2)
CHLORIDE SERPL-SCNC: 108 MMOL/L (ref 96–112)
CO2 SERPL-SCNC: 22 MMOL/L (ref 20–33)
CREAT SERPL-MCNC: 0.48 MG/DL (ref 0.5–1.4)
EKG IMPRESSION: NORMAL
EOSINOPHIL # BLD AUTO: 0.2 K/UL (ref 0–0.51)
EOSINOPHIL NFR BLD: 3.5 % (ref 0–6.9)
ERYTHROCYTE [DISTWIDTH] IN BLOOD BY AUTOMATED COUNT: 48 FL (ref 35.9–50)
FLUAV RNA SPEC QL NAA+PROBE: NEGATIVE
FLUBV RNA SPEC QL NAA+PROBE: NEGATIVE
GFR SERPLBLD CREATININE-BSD FMLA CKD-EPI: 99 ML/MIN/1.73 M 2
GLOBULIN SER CALC-MCNC: 2.5 G/DL (ref 1.9–3.5)
GLUCOSE SERPL-MCNC: 118 MG/DL (ref 65–99)
HCT VFR BLD AUTO: 42.4 % (ref 37–47)
HGB BLD-MCNC: 13.7 G/DL (ref 12–16)
IMM GRANULOCYTES # BLD AUTO: 0.01 K/UL (ref 0–0.11)
IMM GRANULOCYTES NFR BLD AUTO: 0.2 % (ref 0–0.9)
LIPASE SERPL-CCNC: 20 U/L (ref 7–58)
LV EJECT FRACT  99904: 55
LV EJECT FRACT MOD 2C 99903: 65.2
LV EJECT FRACT MOD 4C 99902: 62.35
LV EJECT FRACT MOD BP 99901: 63.4
LYMPHOCYTES # BLD AUTO: 1.12 K/UL (ref 1–4.8)
LYMPHOCYTES NFR BLD: 19.8 % (ref 22–41)
MCH RBC QN AUTO: 30.4 PG (ref 27–33)
MCHC RBC AUTO-ENTMCNC: 32.3 G/DL (ref 33.6–35)
MCV RBC AUTO: 94.2 FL (ref 81.4–97.8)
MONOCYTES # BLD AUTO: 0.49 K/UL (ref 0–0.85)
MONOCYTES NFR BLD AUTO: 8.7 % (ref 0–13.4)
NEUTROPHILS # BLD AUTO: 3.77 K/UL (ref 2–7.15)
NEUTROPHILS NFR BLD: 66.7 % (ref 44–72)
NRBC # BLD AUTO: 0 K/UL
NRBC BLD-RTO: 0 /100 WBC
PLATELET # BLD AUTO: 197 K/UL (ref 164–446)
PMV BLD AUTO: 10.4 FL (ref 9–12.9)
POTASSIUM SERPL-SCNC: 4 MMOL/L (ref 3.6–5.5)
PROT SERPL-MCNC: 6.6 G/DL (ref 6–8.2)
RBC # BLD AUTO: 4.5 M/UL (ref 4.2–5.4)
RSV RNA SPEC QL NAA+PROBE: NEGATIVE
SARS-COV-2 RNA RESP QL NAA+PROBE: NOTDETECTED
SODIUM SERPL-SCNC: 142 MMOL/L (ref 135–145)
SPECIMEN SOURCE: NORMAL
TROPONIN T SERPL-MCNC: 6 NG/L (ref 6–19)
TROPONIN T SERPL-MCNC: 7 NG/L (ref 6–19)
TROPONIN T SERPL-MCNC: <6 NG/L (ref 6–19)
WBC # BLD AUTO: 5.7 K/UL (ref 4.8–10.8)

## 2022-07-11 PROCEDURE — A9270 NON-COVERED ITEM OR SERVICE: HCPCS | Performed by: FAMILY MEDICINE

## 2022-07-11 PROCEDURE — G0378 HOSPITAL OBSERVATION PER HR: HCPCS

## 2022-07-11 PROCEDURE — 71045 X-RAY EXAM CHEST 1 VIEW: CPT

## 2022-07-11 PROCEDURE — A9270 NON-COVERED ITEM OR SERVICE: HCPCS | Performed by: EMERGENCY MEDICINE

## 2022-07-11 PROCEDURE — 93306 TTE W/DOPPLER COMPLETE: CPT | Mod: 26 | Performed by: INTERNAL MEDICINE

## 2022-07-11 PROCEDURE — 93005 ELECTROCARDIOGRAM TRACING: CPT

## 2022-07-11 PROCEDURE — 99219 PR INITIAL OBSERVATION CARE,LEVL II: CPT | Performed by: FAMILY MEDICINE

## 2022-07-11 PROCEDURE — 0241U HCHG SARS-COV-2 COVID-19 NFCT DS RESP RNA 4 TRGT MIC: CPT

## 2022-07-11 PROCEDURE — C9803 HOPD COVID-19 SPEC COLLECT: HCPCS | Performed by: EMERGENCY MEDICINE

## 2022-07-11 PROCEDURE — 700102 HCHG RX REV CODE 250 W/ 637 OVERRIDE(OP): Performed by: EMERGENCY MEDICINE

## 2022-07-11 PROCEDURE — 36415 COLL VENOUS BLD VENIPUNCTURE: CPT

## 2022-07-11 PROCEDURE — 99285 EMERGENCY DEPT VISIT HI MDM: CPT

## 2022-07-11 PROCEDURE — 700102 HCHG RX REV CODE 250 W/ 637 OVERRIDE(OP): Performed by: FAMILY MEDICINE

## 2022-07-11 PROCEDURE — 85025 COMPLETE CBC W/AUTO DIFF WBC: CPT

## 2022-07-11 PROCEDURE — 80053 COMPREHEN METABOLIC PANEL: CPT

## 2022-07-11 PROCEDURE — 93005 ELECTROCARDIOGRAM TRACING: CPT | Performed by: EMERGENCY MEDICINE

## 2022-07-11 PROCEDURE — 83690 ASSAY OF LIPASE: CPT

## 2022-07-11 PROCEDURE — 84484 ASSAY OF TROPONIN QUANT: CPT | Mod: 91

## 2022-07-11 PROCEDURE — 93306 TTE W/DOPPLER COMPLETE: CPT

## 2022-07-11 RX ORDER — OMEPRAZOLE 20 MG/1
20 CAPSULE, DELAYED RELEASE ORAL DAILY
Status: ON HOLD | COMMUNITY
End: 2022-07-12

## 2022-07-11 RX ORDER — ALBUTEROL SULFATE 90 UG/1
2 AEROSOL, METERED RESPIRATORY (INHALATION)
Status: DISCONTINUED | OUTPATIENT
Start: 2022-07-11 | End: 2022-07-12 | Stop reason: HOSPADM

## 2022-07-11 RX ORDER — OMEPRAZOLE 20 MG/1
20 CAPSULE, DELAYED RELEASE ORAL DAILY
Status: DISCONTINUED | OUTPATIENT
Start: 2022-07-12 | End: 2022-07-12 | Stop reason: HOSPADM

## 2022-07-11 RX ORDER — ASPIRIN 300 MG/1
300 SUPPOSITORY RECTAL DAILY
Status: DISCONTINUED | OUTPATIENT
Start: 2022-07-12 | End: 2022-07-12 | Stop reason: HOSPADM

## 2022-07-11 RX ORDER — ACETAMINOPHEN 325 MG/1
650 TABLET ORAL EVERY 6 HOURS PRN
Status: DISCONTINUED | OUTPATIENT
Start: 2022-07-11 | End: 2022-07-12 | Stop reason: HOSPADM

## 2022-07-11 RX ORDER — AMOXICILLIN 250 MG
2 CAPSULE ORAL 2 TIMES DAILY
Status: DISCONTINUED | OUTPATIENT
Start: 2022-07-12 | End: 2022-07-12 | Stop reason: HOSPADM

## 2022-07-11 RX ORDER — NITROGLYCERIN 0.4 MG/1
0.4 TABLET SUBLINGUAL
Status: DISCONTINUED | OUTPATIENT
Start: 2022-07-11 | End: 2022-07-12 | Stop reason: HOSPADM

## 2022-07-11 RX ORDER — ONDANSETRON 4 MG/1
4 TABLET, ORALLY DISINTEGRATING ORAL EVERY 4 HOURS PRN
Status: DISCONTINUED | OUTPATIENT
Start: 2022-07-11 | End: 2022-07-12 | Stop reason: HOSPADM

## 2022-07-11 RX ORDER — ATORVASTATIN CALCIUM 40 MG/1
40 TABLET, FILM COATED ORAL EVERY EVENING
Status: DISCONTINUED | OUTPATIENT
Start: 2022-07-11 | End: 2022-07-12 | Stop reason: HOSPADM

## 2022-07-11 RX ORDER — ATENOLOL 25 MG/1
50 TABLET ORAL DAILY
Status: DISCONTINUED | OUTPATIENT
Start: 2022-07-12 | End: 2022-07-12 | Stop reason: HOSPADM

## 2022-07-11 RX ORDER — ASPIRIN 325 MG
325 TABLET ORAL DAILY
Status: DISCONTINUED | OUTPATIENT
Start: 2022-07-12 | End: 2022-07-12 | Stop reason: HOSPADM

## 2022-07-11 RX ORDER — ATENOLOL 50 MG/1
50 TABLET ORAL DAILY
COMMUNITY
End: 2022-07-15

## 2022-07-11 RX ORDER — ASPIRIN 81 MG/1
324 TABLET, CHEWABLE ORAL DAILY
Status: DISCONTINUED | OUTPATIENT
Start: 2022-07-12 | End: 2022-07-12 | Stop reason: HOSPADM

## 2022-07-11 RX ORDER — POLYETHYLENE GLYCOL 3350 17 G/17G
1 POWDER, FOR SOLUTION ORAL
Status: DISCONTINUED | OUTPATIENT
Start: 2022-07-11 | End: 2022-07-12 | Stop reason: HOSPADM

## 2022-07-11 RX ORDER — ASPIRIN 81 MG/1
324 TABLET, CHEWABLE ORAL ONCE
Status: COMPLETED | OUTPATIENT
Start: 2022-07-11 | End: 2022-07-11

## 2022-07-11 RX ORDER — ONDANSETRON 2 MG/ML
4 INJECTION INTRAMUSCULAR; INTRAVENOUS EVERY 4 HOURS PRN
Status: DISCONTINUED | OUTPATIENT
Start: 2022-07-11 | End: 2022-07-12 | Stop reason: HOSPADM

## 2022-07-11 RX ORDER — BISACODYL 10 MG
10 SUPPOSITORY, RECTAL RECTAL
Status: DISCONTINUED | OUTPATIENT
Start: 2022-07-11 | End: 2022-07-12 | Stop reason: HOSPADM

## 2022-07-11 RX ORDER — LABETALOL HYDROCHLORIDE 5 MG/ML
10 INJECTION, SOLUTION INTRAVENOUS EVERY 4 HOURS PRN
Status: DISCONTINUED | OUTPATIENT
Start: 2022-07-11 | End: 2022-07-12 | Stop reason: HOSPADM

## 2022-07-11 RX ADMIN — LIDOCAINE HYDROCHLORIDE 15 ML: 20 SOLUTION OROPHARYNGEAL at 17:04

## 2022-07-11 RX ADMIN — RIVAROXABAN 10 MG: 10 TABLET, FILM COATED ORAL at 17:31

## 2022-07-11 RX ADMIN — ASPIRIN 81 MG CHEWABLE TABLET 324 MG: 81 TABLET CHEWABLE at 14:20

## 2022-07-11 RX ADMIN — ATORVASTATIN CALCIUM 40 MG: 40 TABLET, FILM COATED ORAL at 17:31

## 2022-07-11 ASSESSMENT — FIBROSIS 4 INDEX
FIB4 SCORE: 1.69
FIB4 SCORE: 1.42

## 2022-07-11 ASSESSMENT — HEART SCORE
ECG: NORMAL
TROPONIN: LESS THAN OR EQUAL TO NORMAL LIMIT
HISTORY: MODERATELY SUSPICIOUS
RISK FACTORS: 1-2 RISK FACTORS
HEART SCORE: 4
AGE: 65+

## 2022-07-11 ASSESSMENT — ENCOUNTER SYMPTOMS
VOMITING: 0
CHILLS: 0
SHORTNESS OF BREATH: 1
COUGH: 1
FEVER: 0
ABDOMINAL PAIN: 0
PALPITATIONS: 1
NAUSEA: 0

## 2022-07-11 NOTE — ED PROVIDER NOTES
ED Provider  Scribed for Ge Rooney D.O. by David Blue. 7/11/2022  1:05 PM    Means of arrival: Walk in  History obtained from: Patient  History limited by: None    CHIEF COMPLAINT  Chief Complaint   Patient presents with   • Chest Pain     Intermittent x 2 d  Chest discomfort Associated with mild SOB Some chest congestion  Indigestion  and diaphoresis No nausea        HPI  Jackie Johnson is a 77 y.o. female who presents for evaluation of moderate chest discomfort onset 2 days ago. She has a history of asthma, bronchitis, and seasonal allergies.  Patient reports she lays down at night and her chest discomfort has a quality of heaviness. She presents associated symptoms of mild shortness of breath, sweating, cough and congestion . She denies symptoms of fevers, chills, or nausea. No alleviating or exacerbating factors noted at this time.  Patient denies history of hypertension or diabetes. She denies any family history of lung or heart complications. She states she does have hyperlipidemia and medicates for it.      REVIEW OF SYSTEMS  See HPI for further details. All other systems are negative.     PAST MEDICAL HISTORY   has a past medical history of Allergy, Anxiety, Depression, Dysrhythmia, History of nonmelanoma skin cancer (3/21/2018), Hypertension, and Vaginal Papanicolaou smear not required due to history of hysterectomy (2/28/2013).    SOCIAL HISTORY  Social History     Tobacco Use   • Smoking status: Former Smoker     Packs/day: 1.00     Years: 15.00     Pack years: 15.00     Types: Cigarettes   • Smokeless tobacco: Never Used   Vaping Use   • Vaping Use: Never used   Substance and Sexual Activity   • Alcohol use: Yes     Alcohol/week: 6.0 oz     Types: 10 Glasses of wine per week     Comment: occ   • Drug use: No   • Sexual activity: Yes     Partners: Male     Birth control/protection: Female Sterilization, Post-Menopausal       SURGICAL HISTORY   has a past surgical history that includes other  "orthopedic surgery (Bilateral); abdominal hysterectomy total; arthroplasty; and eye surgery.    CURRENT MEDICATIONS  Current Outpatient Medications   Medication Instructions   • albuterol 108 (90 Base) MCG/ACT Aero Soln inhalation aerosol Inhale 1 to 2 puffs by mouth every 4 to 6 hours as needed for wheezing and cough   • fluconazole (DIFLUCAN) 50 mg, Oral, DAILY   • omeprazole (PRILOSEC) 20 mg, Oral, DAILY   • sertraline (ZOLOFT) 100 mg, Oral, DAILY   • simvastatin (ZOCOR) 40 mg, Oral, EVERY EVENING       ALLERGIES  Allergies   Allergen Reactions   • Morphine      Lowers heart rate   • Erythromycin Nausea   • Hydrocodone-Acetaminophen    • Mastisol Adhesive [Mastisol] Rash   • Meperidine Hcl Unspecified     [08/08/09 UNKNOWN, PLEASE VERIFY]   • Other Drug      \"All narcotics make me nauseated\"       PHYSICAL EXAM  VITAL SIGNS: /68   Pulse 76   Temp 36.9 °C (98.5 °F) (Oral)   Resp 16   Ht 1.626 m (5' 4\")   Wt 82.9 kg (182 lb 12.2 oz)   SpO2 93%   BMI 31.37 kg/m²   Constitutional: Alert in no apparent distress.  HENT: No signs of trauma, mucous membranes are moist  Eyes: Conjunctiva normal, Non-icteric.   Neck: Normal range of motion, No tenderness, Supple.  Lymphatic: No lymphadenopathy noted.   Cardiovascular: Regular rate and rhythm, no murmurs.   Thorax & Lungs: Normal breath sounds, No respiratory distress, No wheezing, No chest tenderness.   Abdomen: Bowel sounds normal, Soft, No tenderness, No masses, No pulsatile masses. No peritoneal signs.  Skin: Warm, Dry, normal color.   Back: No bony tenderness, No CVA tenderness.   Extremities: No edema, No tenderness, No cyanosis  Musculoskeletal: Good range of motion in all major joints. No tenderness to palpation or major deformities noted.   Neurologic: Alert and oriented x4, Normal motor function, Normal sensory function, No focal deficits noted.   Psychiatric: Affect normal, Judgment normal, Mood normal.     DIAGNOSTIC STUDIES / PROCEDURES    EKG  12 " Lead EKG interpreted by me shown below.      LABS  Results for orders placed or performed during the hospital encounter of 07/11/22   EC-ECHOCARDIOGRAM COMPLETE W/O CONT   Result Value Ref Range    Eject.Frac. MOD BP 63.4     Eject.Frac. MOD 4C 62.35     Eject.Frac. MOD 2C 65.2     Left Ventrical Ejection Fraction 55    CBC with Differential   Result Value Ref Range    WBC 5.7 4.8 - 10.8 K/uL    RBC 4.50 4.20 - 5.40 M/uL    Hemoglobin 13.7 12.0 - 16.0 g/dL    Hematocrit 42.4 37.0 - 47.0 %    MCV 94.2 81.4 - 97.8 fL    MCH 30.4 27.0 - 33.0 pg    MCHC 32.3 (L) 33.6 - 35.0 g/dL    RDW 48.0 35.9 - 50.0 fL    Platelet Count 197 164 - 446 K/uL    MPV 10.4 9.0 - 12.9 fL    Neutrophils-Polys 66.70 44.00 - 72.00 %    Lymphocytes 19.80 (L) 22.00 - 41.00 %    Monocytes 8.70 0.00 - 13.40 %    Eosinophils 3.50 0.00 - 6.90 %    Basophils 1.10 0.00 - 1.80 %    Immature Granulocytes 0.20 0.00 - 0.90 %    Nucleated RBC 0.00 /100 WBC    Neutrophils (Absolute) 3.77 2.00 - 7.15 K/uL    Lymphs (Absolute) 1.12 1.00 - 4.80 K/uL    Monos (Absolute) 0.49 0.00 - 0.85 K/uL    Eos (Absolute) 0.20 0.00 - 0.51 K/uL    Baso (Absolute) 0.06 0.00 - 0.12 K/uL    Immature Granulocytes (abs) 0.01 0.00 - 0.11 K/uL    NRBC (Absolute) 0.00 K/uL   Complete Metabolic Panel (CMP)   Result Value Ref Range    Sodium 142 135 - 145 mmol/L    Potassium 4.0 3.6 - 5.5 mmol/L    Chloride 108 96 - 112 mmol/L    Co2 22 20 - 33 mmol/L    Anion Gap 12.0 7.0 - 16.0    Glucose 118 (H) 65 - 99 mg/dL    Bun 14 8 - 22 mg/dL    Creatinine 0.48 (L) 0.50 - 1.40 mg/dL    Calcium 8.8 8.4 - 10.2 mg/dL    AST(SGOT) 16 12 - 45 U/L    ALT(SGPT) 13 2 - 50 U/L    Alkaline Phosphatase 69 30 - 99 U/L    Total Bilirubin 0.3 0.1 - 1.5 mg/dL    Albumin 4.1 3.2 - 4.9 g/dL    Total Protein 6.6 6.0 - 8.2 g/dL    Globulin 2.5 1.9 - 3.5 g/dL    A-G Ratio 1.6 g/dL   Troponin   Result Value Ref Range    Troponin T <6 6 - 19 ng/L   Lipase   Result Value Ref Range    Lipase 20 7 - 58 U/L   CoV-2,  FLU A/B, and RSV by PCR (2-4 Hours CEPHEID) : Collect NP swab in VTM    Specimen: Respirate   Result Value Ref Range    Influenza virus A RNA Negative Negative    Influenza virus B, PCR Negative Negative    RSV, PCR Negative Negative    SARS-CoV-2 by PCR NotDetected     SARS-CoV-2 Source NP Swab    ESTIMATED GFR   Result Value Ref Range    GFR (CKD-EPI) 99 >60 mL/min/1.73 m 2   Troponin in two (2) hours   Result Value Ref Range    Troponin T 7 6 - 19 ng/L   EKG   Result Value Ref Range    Report       [facility] Emergency Dept.    Test Date:  2022  Pt Name:    SANDRA GEE                Department: EDS  MRN:        [id number]                      Room:       -ROOM 5  Gender:     Female                       Technician: [id number]  :        [id number]                   Requested By:ER TRIAGE PROTOCOL  Order #:    [id number]                    Reading MD: BURAK HUNTER D.O.    Measurements  Intervals                                Axis  Rate:       72                           P:          53  OH:         180                          QRS:        -1  QRSD:       86                           T:          22  QT:         396  QTc:        434    Interpretive Statements  SINUS RHYTHM  BASELINE WANDER IN LEAD(S) V5,V6  No previous ECG available for comparison  Electronically Signed On 2022 14:42:26 PDT by BURAK HUNTER D.O.         All labs reviewed by me.    RADIOLOGY  EC-ECHOCARDIOGRAM COMPLETE W/O CONT   Final Result      DX-CHEST-PORTABLE (1 VIEW)   Final Result      Borderline cardiomegaly.      NM-CARDIAC STRESS TEST    (Results Pending)     The radiologist's interpretations of all radiological studies have been reviewed by me.    Films have been independently by me      COURSE  Pertinent Labs & Imaging studies reviewed. (See chart for details)    1:05 PM - Patient seen and examined at bedside. Discussed plan of care.I informed the patient of my plan to run diagnostic studies  to evaluate their symptoms including labs and imaging. Patient verbalizes understanding and support with my plan of care. The patient will be medicated with aspirin 324 mg. Ordered for EKG, DX- chest, CoV- 2, Flu A/B, and RSV, CBC w/diff, CMP, Troponin, to evaluate her symptoms.      2:36 PM - I reevaluated the patient at bedside. I discussed the patient's diagnostic study results. The patients heart score is 4.  I informed the patient of my plan to admit today given the patient's current presentation and diagnostic study results. Patient verbalizes understanding and support with my plan for admission.     2:37 PM - Paged Hospitalist.    2:51 PM I discussed the patient's case and the above findings with Dr. Moore (Hospitalist) who will assess the patient for hospitalization.     3:27 PM - Patient was questioning if she would stay through admission and who will care for her . She states she has family friends that will stay through admission.      MEDICAL DECISION MAKING  This is a 77 y.o. female who presents with complaints of chest heaviness.  Symptoms are worse at night, does not appear to be with exertion specifically with been constant through the day today.    She has had stress test in the past but is been several years.  Her EKG shows no STEMI, her troponin shows no cardiac injury but her heart score is 4 which is concerning for having cardiac disease.  The patient is admitted for further evaluation and treatment.    DISPOSITION:  Patient will be hospitalized by Dr. Moore in guarded condition.    FINAL IMPRESSION  1. Precordial chest pain         David ROSENTHAL (Gianni), am scribing for, and in the presence of, Ge Rooney D.O..    Electronically signed by: David Blue (Gianni), 7/11/2022    Ge ROSENTHAL D.O. personally performed the services described in this documentation, as scribed by David Blue in my presence, and it is both accurate and complete. C.     The note accurately  reflects work and decisions made by me.  Ge Rooney D.O.  7/11/2022  6:04 PM

## 2022-07-11 NOTE — ED NOTES
Pt going to 312-2  OOB and ambulated strong and steady to BR  Aware of POC  Is to be admitted for further care and treatment of her concerns of chest discomfort

## 2022-07-11 NOTE — ASSESSMENT & PLAN NOTE
- Resume home Atenolol after stress testing tomorrow   - Tele monitoring  - Not set up with Cards yet as she recently moved back here

## 2022-07-11 NOTE — ASSESSMENT & PLAN NOTE
- I suspect this is GERD vs RAD rather than ACS, but given her associated palpitations and history of SVT, prudent to perform stress testing  - No acute wheezing on exam, no steroids at this time  - Will give a GI cocktail and Omeprazole at this time, RT consult with albuterol  - Echo and treadmill stress testing ordered for the am - hold beta blocker in the am   - Serial trops, first negative  - PRN nitro as pt relates an allergy to morphine  - ASA and statin

## 2022-07-11 NOTE — ED NOTES
Pt to RM 5  MD evaluated her  Orders rec'ed and done  COVID swab done and sent to lab  Chest XR being done now

## 2022-07-11 NOTE — H&P
Hospital Medicine History & Physical Note    Date of Service  7/11/2022    Primary Care Physician  Saeid Alvarado M.D.    Consultants  None    Specialist Names: None    Code Status  Full Code    Chief Complaint  Chief Complaint   Patient presents with   • Chest Pain     Intermittent x 2 d  Chest discomfort Associated with mild SOB Some chest congestion  Indigestion  and diaphoresis No nausea        History of Presenting Illness  Jackie Johnson is a 77 y.o. female who presented 7/11/2022 with SOB and chest pressure. She has a PMHx significant for asthma, bronchitis, seasonal allergies, SVT on atenolol, HLD, HTN and GERD and presented to hospital with symptoms of chest pressure. She states that this is what her bronchitis typically feels like, a sensation of pressure without discrete pain, with periodic SOB and cough.  She decided to come to the ER today, because she has had GERD for the past two nights - two nights ago, she attributed it to a large meal and wine, but then it occurred again last night and she thought it prudent to be checked out. She was recently changed off of Prilosec brand name to the generic OTC two weeks ago.  She states that she has also been wheezing at night.  She is, however, concerned about her chest pressure as she did have palpitations during the periods of GERD, and has a history of SVT.  She moved back here recently and isn't established with Cardiology yet.     I discussed the plan of care with patient, family and ERp ERP.    Review of Systems  Review of Systems   Constitutional: Negative for chills and fever.   Respiratory: Positive for cough and shortness of breath.    Cardiovascular: Positive for chest pain and palpitations. Negative for leg swelling.   Gastrointestinal: Negative for abdominal pain, nausea and vomiting.   All other systems reviewed and are negative.      Past Medical History   has a past medical history of Allergy, Anxiety, Depression, Dysrhythmia,  "History of nonmelanoma skin cancer (3/21/2018), Hypertension, and Vaginal Papanicolaou smear not required due to history of hysterectomy (2/28/2013).    Surgical History   has a past surgical history that includes other orthopedic surgery (Bilateral); abdominal hysterectomy total; arthroplasty; and eye surgery.     Family History  family history includes Cancer in her father; Colorectal Cancer in her maternal grandfather; Diabetes in her father; Glaucoma in her maternal grandfather; Heart Disease in her father and maternal grandmother.   Family history reviewed with patient. There is family history that is pertinent to the chief complaint.     Social History   reports that she has quit smoking. Her smoking use included cigarettes. She has a 15.00 pack-year smoking history. She has never used smokeless tobacco. She reports current alcohol use of about 6.0 oz of alcohol per week. She reports that she does not use drugs.    Allergies  Allergies   Allergen Reactions   • Morphine      Lowers heart rate   • Erythromycin Nausea   • Hydrocodone-Acetaminophen    • Mastisol Adhesive [Mastisol] Rash   • Meperidine Hcl Unspecified     [08/08/09 UNKNOWN, PLEASE VERIFY]   • Other Drug      \"All narcotics make me nauseated\"       Medications  Prior to Admission Medications   Prescriptions Last Dose Informant Patient Reported? Taking?   albuterol 108 (90 Base) MCG/ACT Aero Soln inhalation aerosol 7/11/2022 at AM Patient No No   Sig: Inhale 1 to 2 puffs by mouth every 4 to 6 hours as needed for wheezing and cough   atenolol (TENORMIN) 50 MG Tab 7/11/2022 at AM Patient Yes Yes   Sig: Take 50 mg by mouth every day.   omeprazole (PRILOSEC) 20 MG delayed-release capsule 7/11/2022 at AM Patient Yes Yes   Sig: Take 20 mg by mouth every day.   sertraline (ZOLOFT) 100 MG Tab 7/11/2022 at AM Patient No No   Sig: Take 1 Tablet by mouth every day for 360 days.   simvastatin (ZOCOR) 40 MG Tab 7/10/2022 at PM Patient No No   Sig: Take 1 Tablet " by mouth every evening for 360 days.      Facility-Administered Medications: None       Physical Exam  Temp:  [35.2 °C (95.4 °F)-36.9 °C (98.5 °F)] 36.4 °C (97.6 °F)  Pulse:  [59-76] 59  Resp:  [16] 16  BP: (119-145)/(68-81) 145/81  SpO2:  [93 %-95 %] 95 %  Blood Pressure : (!) 145/81   Temperature: 36.4 °C (97.6 °F)   Pulse: (!) 59   Respiration: 16   Pulse Oximetry: 95 %       Physical Exam  Vitals and nursing note reviewed.   Constitutional:       Appearance: Normal appearance.   HENT:      Head: Normocephalic and atraumatic.   Eyes:      Extraocular Movements: Extraocular movements intact.   Cardiovascular:      Rate and Rhythm: Normal rate and regular rhythm.      Heart sounds: No murmur heard.  Pulmonary:      Effort: Pulmonary effort is normal. No respiratory distress.      Breath sounds: Normal breath sounds. No wheezing, rhonchi or rales.   Abdominal:      General: Abdomen is flat. Bowel sounds are normal. There is no distension.      Palpations: Abdomen is soft.      Tenderness: There is no abdominal tenderness.   Musculoskeletal:         General: No swelling.      Cervical back: Normal range of motion.   Skin:     General: Skin is warm and dry.   Neurological:      General: No focal deficit present.      Mental Status: She is alert and oriented to person, place, and time. Mental status is at baseline.   Psychiatric:         Behavior: Behavior normal.         Laboratory:  Recent Labs     07/11/22  1302   WBC 5.7   RBC 4.50   HEMOGLOBIN 13.7   HEMATOCRIT 42.4   MCV 94.2   MCH 30.4   MCHC 32.3*   RDW 48.0   PLATELETCT 197   MPV 10.4     Recent Labs     07/11/22  1302   SODIUM 142   POTASSIUM 4.0   CHLORIDE 108   CO2 22   GLUCOSE 118*   BUN 14   CREATININE 0.48*   CALCIUM 8.8     Recent Labs     07/11/22  1302   ALTSGPT 13   ASTSGOT 16   ALKPHOSPHAT 69   TBILIRUBIN 0.3   LIPASE 20   GLUCOSE 118*         No results for input(s): NTPROBNP in the last 72 hours.      Recent Labs     07/11/22  1302   TROPONINT <6        Imaging:  DX-CHEST-PORTABLE (1 VIEW)   Final Result      Borderline cardiomegaly.      NM-CARDIAC STRESS TEST    (Results Pending)   EC-ECHOCARDIOGRAM COMPLETE W/ CONT    (Results Pending)       X-Ray:  I have personally reviewed the images and compared with prior images. and My impression is: No discrete infiltrate or edema  EKG:  I have personally reviewed the images and compared with prior images. and My impression is: NSR    Assessment/Plan:  Justification for Admission Status  I anticipate this patient is appropriate for observation status at this time because pt to have a CP rule out, echo and stress testing     * Chest pressure- (present on admission)  Assessment & Plan  - I suspect this is GERD vs RAD rather than ACS, but given her associated palpitations and history of SVT, prudent to perform stress testing  - No acute wheezing on exam, no steroids at this time  - Will give a GI cocktail and Omeprazole at this time, RT consult with albuterol  - Echo and treadmill stress testing ordered for the am - hold beta blocker in the am   - Serial trops, first negative  - PRN nitro as pt relates an allergy to morphine  - ASA and statin     Asthma  Assessment & Plan  - Pt states she was having some wheezing at home, none now  - PRN albuterol and RT consult    Hyperlipidemia- (present on admission)  Assessment & Plan  - Change home statin to Atorvastatin for chest pain rule out       Essential hypertension- (present on admission)  Assessment & Plan  - Continue home atenolol after treadmill stress test tomorrow       GERD (gastroesophageal reflux disease)- (present on admission)  Assessment & Plan  - Omeprazole, GI cocktail      Supraventricular tachycardia, paroxysmal (HCC)- (present on admission)  Assessment & Plan  - Resume home Atenolol after stress testing tomorrow   - Tele monitoring  - Not set up with Cards yet as she recently moved back here       VTE prophylaxis: SCDs/TEDs and Xarelto 10 mg daily as  prophylaxis

## 2022-07-11 NOTE — ED TRIAGE NOTES
Pt AAO Skin cool and diaphoretic     Took home Covid test yesterday and was negative    Pt fully vaccinated for Covid

## 2022-07-11 NOTE — ED NOTES
"Pt called staff to room  She is very concerned about  who \"has early alzheimer\" and she is worried about him being home alone since she is to be admitted  Requesting to speak to MD  MD aware and is now is speaking w/ pt   "

## 2022-07-12 ENCOUNTER — APPOINTMENT (OUTPATIENT)
Dept: RADIOLOGY | Facility: MEDICAL CENTER | Age: 77
End: 2022-07-12
Attending: FAMILY MEDICINE
Payer: MEDICARE

## 2022-07-12 VITALS
WEIGHT: 183.2 LBS | RESPIRATION RATE: 18 BRPM | DIASTOLIC BLOOD PRESSURE: 77 MMHG | OXYGEN SATURATION: 95 % | SYSTOLIC BLOOD PRESSURE: 129 MMHG | HEIGHT: 64 IN | TEMPERATURE: 98 F | BODY MASS INDEX: 31.28 KG/M2 | HEART RATE: 79 BPM

## 2022-07-12 LAB
ANION GAP SERPL CALC-SCNC: 10 MMOL/L (ref 7–16)
BUN SERPL-MCNC: 14 MG/DL (ref 8–22)
CALCIUM SERPL-MCNC: 8.9 MG/DL (ref 8.4–10.2)
CHLORIDE SERPL-SCNC: 105 MMOL/L (ref 96–112)
CHOLEST SERPL-MCNC: 159 MG/DL (ref 100–199)
CO2 SERPL-SCNC: 27 MMOL/L (ref 20–33)
CREAT SERPL-MCNC: 0.54 MG/DL (ref 0.5–1.4)
ERYTHROCYTE [DISTWIDTH] IN BLOOD BY AUTOMATED COUNT: 48.5 FL (ref 35.9–50)
GFR SERPLBLD CREATININE-BSD FMLA CKD-EPI: 96 ML/MIN/1.73 M 2
GLUCOSE SERPL-MCNC: 95 MG/DL (ref 65–99)
HCT VFR BLD AUTO: 41.8 % (ref 37–47)
HDLC SERPL-MCNC: 45 MG/DL
HGB BLD-MCNC: 13.4 G/DL (ref 12–16)
LDLC SERPL CALC-MCNC: 71 MG/DL
MCH RBC QN AUTO: 30.5 PG (ref 27–33)
MCHC RBC AUTO-ENTMCNC: 32.1 G/DL (ref 33.6–35)
MCV RBC AUTO: 95.2 FL (ref 81.4–97.8)
PLATELET # BLD AUTO: 183 K/UL (ref 164–446)
PMV BLD AUTO: 10.9 FL (ref 9–12.9)
POTASSIUM SERPL-SCNC: 4 MMOL/L (ref 3.6–5.5)
RBC # BLD AUTO: 4.39 M/UL (ref 4.2–5.4)
SODIUM SERPL-SCNC: 142 MMOL/L (ref 135–145)
TRIGL SERPL-MCNC: 214 MG/DL (ref 0–149)
WBC # BLD AUTO: 5.5 K/UL (ref 4.8–10.8)

## 2022-07-12 PROCEDURE — 700102 HCHG RX REV CODE 250 W/ 637 OVERRIDE(OP): Performed by: FAMILY MEDICINE

## 2022-07-12 PROCEDURE — 99217 PR OBSERVATION CARE DISCHARGE: CPT | Performed by: INTERNAL MEDICINE

## 2022-07-12 PROCEDURE — A9270 NON-COVERED ITEM OR SERVICE: HCPCS | Performed by: FAMILY MEDICINE

## 2022-07-12 PROCEDURE — G0378 HOSPITAL OBSERVATION PER HR: HCPCS

## 2022-07-12 PROCEDURE — 80048 BASIC METABOLIC PNL TOTAL CA: CPT

## 2022-07-12 PROCEDURE — 93018 CV STRESS TEST I&R ONLY: CPT | Performed by: INTERNAL MEDICINE

## 2022-07-12 PROCEDURE — 700111 HCHG RX REV CODE 636 W/ 250 OVERRIDE (IP)

## 2022-07-12 PROCEDURE — 80061 LIPID PANEL: CPT

## 2022-07-12 PROCEDURE — A9502 TC99M TETROFOSMIN: HCPCS

## 2022-07-12 PROCEDURE — 78452 HT MUSCLE IMAGE SPECT MULT: CPT | Mod: 26 | Performed by: INTERNAL MEDICINE

## 2022-07-12 PROCEDURE — 85027 COMPLETE CBC AUTOMATED: CPT

## 2022-07-12 RX ORDER — REGADENOSON 0.08 MG/ML
INJECTION, SOLUTION INTRAVENOUS
Status: DISCONTINUED
Start: 2022-07-12 | End: 2022-07-12

## 2022-07-12 RX ORDER — OMEPRAZOLE 20 MG/1
20 CAPSULE, DELAYED RELEASE ORAL 2 TIMES DAILY
Qty: 60 CAPSULE | Refills: 0 | Status: SHIPPED | OUTPATIENT
Start: 2022-07-12 | End: 2022-08-18 | Stop reason: SDUPTHER

## 2022-07-12 RX ADMIN — REGADENOSON 0.4 MG: 0.08 INJECTION, SOLUTION INTRAVENOUS at 08:49

## 2022-07-12 RX ADMIN — SERTRALINE HYDROCHLORIDE 100 MG: 50 TABLET ORAL at 05:42

## 2022-07-12 RX ADMIN — ASPIRIN 325 MG ORAL TABLET 325 MG: 325 PILL ORAL at 05:42

## 2022-07-12 RX ADMIN — SENNOSIDES AND DOCUSATE SODIUM 2 TABLET: 50; 8.6 TABLET ORAL at 05:42

## 2022-07-12 RX ADMIN — OMEPRAZOLE 20 MG: 20 CAPSULE, DELAYED RELEASE ORAL at 05:42

## 2022-07-12 ASSESSMENT — LIFESTYLE VARIABLES
HAVE PEOPLE ANNOYED YOU BY CRITICIZING YOUR DRINKING: NO
HOW MANY TIMES IN THE PAST YEAR HAVE YOU HAD 5 OR MORE DRINKS IN A DAY: 0
EVER FELT BAD OR GUILTY ABOUT YOUR DRINKING: NO
CONSUMPTION TOTAL: NEGATIVE
TOTAL SCORE: 0
HAVE YOU EVER FELT YOU SHOULD CUT DOWN ON YOUR DRINKING: NO
ALCOHOL_USE: YES
ON A TYPICAL DAY WHEN YOU DRINK ALCOHOL HOW MANY DRINKS DO YOU HAVE: 2
TOTAL SCORE: 0
AVERAGE NUMBER OF DAYS PER WEEK YOU HAVE A DRINK CONTAINING ALCOHOL: 2
EVER HAD A DRINK FIRST THING IN THE MORNING TO STEADY YOUR NERVES TO GET RID OF A HANGOVER: NO
TOTAL SCORE: 0

## 2022-07-12 ASSESSMENT — COGNITIVE AND FUNCTIONAL STATUS - GENERAL
DAILY ACTIVITIY SCORE: 24
MOBILITY SCORE: 24
SUGGESTED CMS G CODE MODIFIER MOBILITY: CH
SUGGESTED CMS G CODE MODIFIER DAILY ACTIVITY: CH

## 2022-07-12 ASSESSMENT — PAIN DESCRIPTION - PAIN TYPE: TYPE: ACUTE PAIN

## 2022-07-12 ASSESSMENT — PATIENT HEALTH QUESTIONNAIRE - PHQ9
2. FEELING DOWN, DEPRESSED, IRRITABLE, OR HOPELESS: NOT AT ALL
SUM OF ALL RESPONSES TO PHQ9 QUESTIONS 1 AND 2: 0
1. LITTLE INTEREST OR PLEASURE IN DOING THINGS: NOT AT ALL

## 2022-07-12 NOTE — PROGRESS NOTES
Discharge instructions given and discussed, signed copy in chart. Pt verbalized understanding and all questions answered. Home prescriptions to be picked up at pharmacy. Pt discharged home in stable condition escorted by spouse. Personal belongings with patient. IV removed and tolerated well. Tele box removed, monitor tech notified.

## 2022-07-12 NOTE — PROGRESS NOTES
4 Eyes Skin Assessment Completed by MAGNOLIA Chun and MAGNOLIA Chu.    Head WDL  Ears WDL  Nose WDL  Mouth WDL  Neck WDL  Breast/Chest WDL  Shoulder Blades WDL  Spine WDL  (R) Arm/Elbow/Hand WDL  (L) Arm/Elbow/Hand WDL  Abdomen WDL  Groin WDL  Scrotum/Coccyx/Buttocks WDL  (R) Leg WDL  (L) Leg WDL  (R) Heel/Foot/Toe WDL  (L) Heel/Foot/Toe WDL          Devices In Places Tele Box      Interventions In Place N/A    Possible Skin Injury No    Pictures Uploaded Into Epic N/A  Wound Consult Placed N/A  RN Wound Prevention Protocol Ordered No

## 2022-07-12 NOTE — CARE PLAN
The patient is Stable - Low risk of patient condition declining or worsening    Shift Goals  Clinical Goals: Stress test at 0800, monitor telemetry.    Progress made toward(s) clinical / shift goals:    Problem: Psychosocial  Goal: Patient's level of anxiety will decrease  Outcome: Progressing  Note: Pt does not express anxiety. Mood is pleasant.        Patient is not progressing towards the following goals:

## 2022-07-12 NOTE — PROGRESS NOTES
Telemetry Shift Summary     Rhythm: SB/SR  HR: 58-80  Ectopy: r-o Trig, o-f PVC, r Big, r PAC    Measurements: 0.16/0.08/0.40    Normal Values  Rhythm: SR  HR:   Measurements: 0.12-0.20/0.08-0.10/0.30-0.52

## 2022-07-12 NOTE — DISCHARGE INSTRUCTIONS
Discharge Instructions    Discharged to home by car with relative. Discharged via wheelchair, hospital escort: Yes.  Special equipment needed: Not Applicable    Be sure to schedule a follow-up appointment with your primary care doctor or any specialists as instructed.     Discharge Plan:   Diet Plan: Discussed  Activity Level: Discussed  Confirmed Follow up Appointment: Appointment Scheduled  Confirmed Symptoms Management: Discussed  Medication Reconciliation Updated: Yes    I understand that a diet low in cholesterol, fat, and sodium is recommended for good health. Unless I have been given specific instructions below for another diet, I accept this instruction as my diet prescription.   Other diet: regular    Special Instructions: None    -Is this patient being discharged with medication to prevent blood clots?  No    Is patient discharged on Warfarin / Coumadin?   No

## 2022-07-12 NOTE — DISCHARGE SUMMARY
"Discharge Summary    CHIEF COMPLAINT ON ADMISSION  Chief Complaint   Patient presents with   • Chest Pain     Intermittent x 2 d  Chest discomfort Associated with mild SOB Some chest congestion  Indigestion  and diaphoresis No nausea        Reason for Admission  Chest Pressure, Other     Admission Date  7/11/2022    CODE STATUS  Full Code    HPI & HOSPITAL COURSE  As per chart review:  \"77 y.o. female who presented 7/11/2022 with SOB and chest pressure. She has a PMHx significant for asthma, bronchitis, seasonal allergies, SVT on atenolol, HLD, HTN and GERD and presented to hospital with symptoms of chest pressure. She states that this is what her bronchitis typically feels like, a sensation of pressure without discrete pain, with periodic SOB and cough.  She decided to come to the ER today, because she has had GERD for the past two nights - two nights ago, she attributed it to a large meal and wine, but then it occurred again last night and she thought it prudent to be checked out. She was recently changed off of Prilosec brand name to the generic OTC two weeks ago.  She states that she has also been wheezing at night.  She is, however, concerned about her chest pressure as she did have palpitations during the periods of GERD, and has a history of SVT.  She moved back here recently and isn't established with Cardiology yet.\"    Patient was admitted for further management and care.  The patient underwent stress test which was negative stress test.  The patient also had an echocardiogram which reported an ejection fraction of 55%, normal right ventricular size and systolic function.  No significant valvular abnormalities.    Upon further review.  It seems that this patient's chest pain most likely is due to uncontrolled GERD even with PPI.  We have increased the PPI dose to twice daily.  We have also placed referral to gastroenterology as an outpatient.    The patient will require close follow-up with PCP as an " outpatient.     Therefore, she is discharged in fair and stable condition to home with close outpatient follow-up.    The patient recovered much more quickly than anticipated on admission.    Discharge Date  07/12/2022    FOLLOW UP ITEMS POST DISCHARGE  The patient will require close follow up with PCP as outpatient.    DISCHARGE DIAGNOSES  Principal Problem:    Chest pressure POA: Yes  Active Problems:    Supraventricular tachycardia, paroxysmal (HCC) POA: Yes      Overview: Formatting of this note might be different from the original.      On atenolol, stable 6/09      System administrative change to the chronic problem designation    GERD (gastroesophageal reflux disease) POA: Yes      Overview: Formatting of this note might be different from the original.      On prilosec 8/08    Essential hypertension POA: Yes    Hyperlipidemia POA: Yes      Overview: Formatting of this note might be different from the original.      Pt agrees with plan  And requests A1c in addition to labs.  Ordered.       Shira Da Silva RN-BSN      Complete [facility] Team      5/22/2018            10 yr ASCVD risk 11% - HIGH - Moderate to High Intensity Statin Indicated       Recommend Simvastatin 40mg.       Chart routed to RN for outreach.             Signed by: Jeanna Amos NP      5/15/2018    Asthma POA: Unknown  Resolved Problems:    * No resolved hospital problems. *      FOLLOW UP  No future appointments.  Saeid Alvarado M.D.  [id number] [id number] [phone]    Schedule an appointment as soon as possible for a visit        MEDICATIONS ON DISCHARGE     Medication List      CHANGE how you take these medications      Instructions   omeprazole 20 MG delayed-release capsule  What changed: when to take this  Commonly known as: PRILOSEC   Take 1 Capsule by mouth 2 times a day.  Dose: 20 mg        CONTINUE taking these medications      Instructions   albuterol 108  "(90 Base) MCG/ACT Aers inhalation aerosol   Inhale 1 to 2 puffs by mouth every 4 to 6 hours as needed for wheezing and cough     atenolol 50 MG Tabs  Commonly known as: TENORMIN   Take 50 mg by mouth every day.  Dose: 50 mg     sertraline 100 MG Tabs  Commonly known as: Zoloft   Take 1 Tablet by mouth every day for 360 days.  Dose: 100 mg     simvastatin 40 MG Tabs  Commonly known as: ZOCOR   Take 1 Tablet by mouth every evening for 360 days.  Dose: 40 mg            Allergies  Allergies   Allergen Reactions   • Morphine      Lowers heart rate   • Erythromycin Nausea   • Hydrocodone-Acetaminophen    • Mastisol Adhesive [Mastisol] Rash   • Meperidine Hcl Unspecified     [08/08/09 UNKNOWN, PLEASE VERIFY]   • Other Drug      \"All narcotics make me nauseated\"       DIET  Orders Placed This Encounter   Procedures   • Diet Order Diet: Cardiac     Standing Status:   Standing     Number of Occurrences:   1     Order Specific Question:   Diet:     Answer:   Cardiac [6]       ACTIVITY  As tolerated.  Weight bearing as tolerated    CONSULTATIONS  none    PROCEDURES  Stress Test    NM-CARDIAC STRESS TEST   Final Result      EC-ECHOCARDIOGRAM COMPLETE W/O CONT   Final Result      DX-CHEST-PORTABLE (1 VIEW)   Final Result      Borderline cardiomegaly.          LABORATORY  Lab Results   Component Value Date    SODIUM 142 07/12/2022    POTASSIUM 4.0 07/12/2022    CHLORIDE 105 07/12/2022    CO2 27 07/12/2022    GLUCOSE 95 07/12/2022    BUN 14 07/12/2022    CREATININE 0.54 07/12/2022        Lab Results   Component Value Date    WBC 5.5 07/12/2022    HEMOGLOBIN 13.4 07/12/2022    HEMATOCRIT 41.8 07/12/2022    PLATELETCT 183 07/12/2022        Total time of the discharge process exceeds 35 minutes.  "

## 2022-07-12 NOTE — CARE PLAN
The patient is Stable - Low risk of patient condition declining or worsening    Shift Goals  Clinical Goals: NPO for stress test in am, monitor labs and telemetry    Progress made toward(s) clinical / shift goals:        Problem: Knowledge Deficit - Standard  Goal: Patient and family/care givers will demonstrate understanding of plan of care, disease process/condition, diagnostic tests and medications  Outcome: Progressing  Note: Discussed POC with patient including medications, labs, NPO for cardiac stress test in am. Patient agrees with plan and verbalizes understanding.        Patient is not progressing towards the following goals:

## 2022-07-12 NOTE — PROCEDURES
Pt converted to chemical version of stress test at her request. Exam completed without complication.

## 2022-07-12 NOTE — ED NOTES
PO med given per order  Taken well  Called report to 3rd floor  Treasure Callahan given report  Pt to [phone] now in NAD

## 2022-07-13 ENCOUNTER — OFFICE VISIT (OUTPATIENT)
Dept: MEDICAL GROUP | Facility: LAB | Age: 77
End: 2022-07-13
Payer: MEDICARE

## 2022-07-13 ENCOUNTER — TELEPHONE (OUTPATIENT)
Dept: MEDICAL GROUP | Facility: LAB | Age: 77
End: 2022-07-13

## 2022-07-13 VITALS
RESPIRATION RATE: 14 BRPM | TEMPERATURE: 97.1 F | DIASTOLIC BLOOD PRESSURE: 74 MMHG | HEIGHT: 64 IN | HEART RATE: 66 BPM | BODY MASS INDEX: 30.93 KG/M2 | WEIGHT: 181.2 LBS | OXYGEN SATURATION: 94 % | SYSTOLIC BLOOD PRESSURE: 126 MMHG

## 2022-07-13 DIAGNOSIS — R05.9 COUGH: ICD-10-CM

## 2022-07-13 PROCEDURE — 99213 OFFICE O/P EST LOW 20 MIN: CPT | Performed by: PHYSICIAN ASSISTANT

## 2022-07-13 ASSESSMENT — FIBROSIS 4 INDEX: FIB4 SCORE: 1.82

## 2022-07-13 NOTE — PROGRESS NOTES
Telemetry Shift Summary    Rhythm SR  HR Range 70S-80S   Ectopy R-PAC, R-PVC          Normal Values  Rhythm SR  HR Range    Measurements 0.12-0.20 / 0.06-0.10  / 0.30-0.52

## 2022-07-13 NOTE — PROGRESS NOTES
"Subjective:     CC: cough    HPI:   Jackie here today with    Pt recently admitted for chest pressure and SOB, underwent cardiac workup. No significant findings. Treated for uncontrolled GERD.  Patient has had 1 day of omeprazole 40 mg daily referred to GI. Pt has f/u with PCP for this scheduled 07/20/2022.    Covid/RSV/Influenza tests negative. CXR negative.     At this time pt reports persistent cough. Slightly productive and clear mucus  No fever, chills,  No sinus pressure, sinus drainage  No N/V/D        ROS:  ROS negative except as indicated above    Current Outpatient Medications Ordered in Epic   Medication Sig Dispense Refill   • atenolol (TENORMIN) 50 MG Tab Take 1 Tablet by mouth every day for 90 days. 90 Tablet 0   • omeprazole (PRILOSEC) 20 MG delayed-release capsule Take 1 Capsule by mouth 2 times a day. 60 Capsule 0   • atenolol (TENORMIN) 50 MG Tab Take 50 mg by mouth every day.     • sertraline (ZOLOFT) 100 MG Tab Take 1 Tablet by mouth every day for 360 days. 90 Tablet 1   • simvastatin (ZOCOR) 40 MG Tab Take 1 Tablet by mouth every evening for 360 days. 90 Tablet 3   • albuterol 108 (90 Base) MCG/ACT Aero Soln inhalation aerosol Inhale 1 to 2 puffs by mouth every 4 to 6 hours as needed for wheezing and cough 8.5 g 2     No current Epic-ordered facility-administered medications on file.           Objective:     Exam:  /74 (BP Location: Left arm, Patient Position: Sitting, BP Cuff Size: Adult)   Pulse 66   Temp 36.2 °C (97.1 °F)   Resp 14   Ht 1.626 m (5' 4.02\")   Wt 82.2 kg (181 lb 3.2 oz)   SpO2 94%   BMI 31.09 kg/m²  Body mass index is 31.09 kg/m².    Gen: Alert and oriented, No apparent distress.  Neck: Neck is supple without lymphadenopathy.  Lungs: Normal effort, CTA bilaterally, no wheezes, rhonchi, or rales  CV: Regular rate and rhythm. No murmurs, rubs, or gallops.  Ext: No clubbing, cyanosis, edema.      Assessment & Plan:     77 y.o. female with the following -     1. " Cough  unlikely infectious etiology given recent work-up  Cough suspected secondary to uncontrolled GERD  Continue omeprazole 40m and GI f/u    I spent a total of 15 minutes with record review, exam, communication with the patient, communication with other providers, and documentation of this encounter.      No follow-ups on file.    Please note that this dictation was created using voice recognition software. I have made every reasonable attempt to correct obvious errors, but there may be errors of grammar and possibly content that I did not discover before finalizing the note.

## 2022-08-05 ENCOUNTER — TELEPHONE (OUTPATIENT)
Dept: MEDICAL GROUP | Facility: LAB | Age: 77
End: 2022-08-05
Payer: COMMERCIAL

## 2022-08-05 DIAGNOSIS — Z23 NEED FOR VACCINATION: ICD-10-CM

## 2022-08-05 NOTE — TELEPHONE ENCOUNTER
1. Caller Name: Jackie Johnson                          Call Back Number: [phone] (home)         How would the patient prefer to be contacted with a response: Phone call OK to leave a detailed message    Please advise on 2 Different scripts for shingles vaccine dose 1 and 2 to [facility] pharmacy for cheaper option.

## 2022-08-08 RX ORDER — ZOSTER VACCINE RECOMBINANT, ADJUVANTED 50 MCG/0.5
0.5 KIT INTRAMUSCULAR ONCE
Qty: 1 EACH | Refills: 0 | Status: SHIPPED
Start: 2022-08-08 | End: 2022-08-08

## 2022-08-08 RX ORDER — ZOSTER VACCINE RECOMBINANT, ADJUVANTED 50 MCG/0.5
0.5 KIT INTRAMUSCULAR ONCE
Qty: 1 EACH | Refills: 0 | Status: SHIPPED | OUTPATIENT
Start: 2022-08-08 | End: 2022-08-08

## 2022-08-18 ENCOUNTER — OFFICE VISIT (OUTPATIENT)
Dept: MEDICAL GROUP | Facility: LAB | Age: 77
End: 2022-08-18
Payer: MEDICARE

## 2022-08-18 VITALS
OXYGEN SATURATION: 94 % | DIASTOLIC BLOOD PRESSURE: 76 MMHG | WEIGHT: 178 LBS | BODY MASS INDEX: 30.39 KG/M2 | TEMPERATURE: 97.3 F | RESPIRATION RATE: 15 BRPM | HEART RATE: 76 BPM | HEIGHT: 64 IN | SYSTOLIC BLOOD PRESSURE: 120 MMHG

## 2022-08-18 DIAGNOSIS — K21.9 GASTROESOPHAGEAL REFLUX DISEASE, UNSPECIFIED WHETHER ESOPHAGITIS PRESENT: ICD-10-CM

## 2022-08-18 DIAGNOSIS — F33.0 MILD EPISODE OF RECURRENT MAJOR DEPRESSIVE DISORDER (HCC): ICD-10-CM

## 2022-08-18 PROCEDURE — 99214 OFFICE O/P EST MOD 30 MIN: CPT | Performed by: FAMILY MEDICINE

## 2022-08-18 RX ORDER — ALBUTEROL SULFATE 90 UG/1
AEROSOL, METERED RESPIRATORY (INHALATION)
Qty: 8.5 G | Refills: 2 | Status: SHIPPED | OUTPATIENT
Start: 2022-08-18

## 2022-08-18 RX ORDER — SIMVASTATIN 40 MG
40 TABLET ORAL EVERY EVENING
Qty: 90 TABLET | Refills: 3 | Status: SHIPPED | OUTPATIENT
Start: 2022-08-18 | End: 2023-05-31

## 2022-08-18 RX ORDER — SERTRALINE HYDROCHLORIDE 100 MG/1
100 TABLET, FILM COATED ORAL DAILY
Qty: 90 TABLET | Refills: 3 | Status: SHIPPED | OUTPATIENT
Start: 2022-08-18 | End: 2023-02-13 | Stop reason: SDUPTHER

## 2022-08-18 RX ORDER — ATENOLOL 50 MG/1
50 TABLET ORAL DAILY
Qty: 90 TABLET | Refills: 0 | Status: SHIPPED | OUTPATIENT
Start: 2022-08-18 | End: 2022-11-22

## 2022-08-18 RX ORDER — OMEPRAZOLE 20 MG/1
20 CAPSULE, DELAYED RELEASE ORAL 2 TIMES DAILY
Qty: 60 CAPSULE | Refills: 0 | Status: SHIPPED | OUTPATIENT
Start: 2022-08-18 | End: 2022-09-19

## 2022-08-18 ASSESSMENT — FIBROSIS 4 INDEX: FIB4 SCORE: 1.82

## 2022-08-18 NOTE — PROGRESS NOTES
"Subjective:   Jackie Johnson is a 77 y.o. female here today for   Chief Complaint   Patient presents with    Patient Question     X few patient questions        #Depression:  -Currently treating with sertraline 100 mg daily.  Compliant medication, denies any side effects.  Continues to show having improvement and is happy with medication and wishes to continue at this time.  Denies any suicidal/homicidal ideations, insomnia, substance abuse.    #GERD:  -Patient been having symptoms of GERD and was seen in the hospital for SVT and chest discomfort back in July.  At that time she was placed on omeprazole 20 mg twice daily.  Since taking the omeprazole she has noticed a significant improvement in symptoms.  She denies any chest pain, difficulty breathing, difficulty swallowing, shortness of breath, palpitations.      Allergies   Allergen Reactions    Morphine      Lowers heart rate    Erythromycin Nausea    Hydrocodone-Acetaminophen     Mastisol Adhesive [Mastisol] Rash    Meperidine Hcl Unspecified     [08/08/09 UNKNOWN, PLEASE VERIFY]    Other Drug      \"All narcotics make me nauseated\"         Current medicines (including changes today)  Current Outpatient Medications   Medication Sig Dispense Refill    atenolol (TENORMIN) 50 MG Tab Take 1 Tablet by mouth every day for 90 days. 90 Tablet 0    omeprazole (PRILOSEC) 20 MG delayed-release capsule Take 1 Capsule by mouth 2 times a day. 60 Capsule 0    sertraline (ZOLOFT) 100 MG Tab Take 1 Tablet by mouth every day for 360 days. 90 Tablet 1    simvastatin (ZOCOR) 40 MG Tab Take 1 Tablet by mouth every evening for 360 days. 90 Tablet 3    albuterol 108 (90 Base) MCG/ACT Aero Soln inhalation aerosol Inhale 1 to 2 puffs by mouth every 4 to 6 hours as needed for wheezing and cough 8.5 g 2     No current facility-administered medications for this visit.     She  has a past medical history of Allergy, Anxiety, Depression, Dysrhythmia, History of nonmelanoma skin cancer " "(3/21/2018), Hypertension, and Vaginal Papanicolaou smear not required due to history of hysterectomy (2/28/2013).    ROS   -See HPI       Objective:     Physical Exam:  /76   Pulse 76   Temp 36.3 °C (97.3 °F) (Temporal)   Resp 15   Ht 1.626 m (5' 4.02\")   Wt 80.7 kg (178 lb)   SpO2 94%  Body mass index is 30.54 kg/m².   Constitutional: Alert, no distress.  Skin: Warm, dry, good turgor, no rashes in visible areas.  Eye: Equal, round and reactive, conjunctiva clear, lids normal.  Respiratory: Unlabored respiratory effort, lungs clear to auscultation, no wheezes, no rhonchi.  Cardiovascular: Normal S1, S2, no murmur, no edema.  Abdomen: Soft, non-tender, no masses, no hepatosplenomegaly.  Psych: Alert and oriented x3, normal affect and mood.    Assessment and Plan:     1. Gastroesophageal reflux disease, unspecified whether esophagitis present  -Stable.  Discussed appropriate diet and exercise regimen.  No red flag symptoms.  We will continue with the omeprazole twice a day.  Patient will follow-up as needed.  - omeprazole (PRILOSEC) 20 MG delayed-release capsule; Take 1 Capsule by mouth 2 times a day.  Dispense: 60 Capsule; Refill: 0    2. Mild episode of recurrent major depressive disorder (HCC)  -Stable, no concerns.  We will continue Zoloft 100 mg.  - sertraline (ZOLOFT) 100 MG Tab; Take 1 Tablet by mouth every day for 360 days.  Dispense: 90 Tablet; Refill: 3      Followup: No follow-ups on file.         PLEASE NOTE: This dictation was created using voice recognition software. I have made every reasonable attempt to correct obvious errors, but I expect that there are errors of grammar and possibly content that I did not discover before finalizing the note.  "

## 2022-09-19 DIAGNOSIS — K21.9 GASTROESOPHAGEAL REFLUX DISEASE, UNSPECIFIED WHETHER ESOPHAGITIS PRESENT: ICD-10-CM

## 2022-09-19 RX ORDER — OMEPRAZOLE 20 MG/1
CAPSULE, DELAYED RELEASE ORAL
Qty: 180 CAPSULE | Refills: 2 | Status: SHIPPED | OUTPATIENT
Start: 2022-09-19 | End: 2023-02-13 | Stop reason: SDUPTHER

## 2022-09-19 NOTE — TELEPHONE ENCOUNTER
Received request via: Pharmacy    Was the patient seen in the last year in this department? Yes    Does the patient have an active prescription (recently filled or refills available) for medication(s) requested? No      OMEPRAZOLE DR 20 MG CAPSULE

## 2022-11-11 ENCOUNTER — PATIENT MESSAGE (OUTPATIENT)
Dept: HEALTH INFORMATION MANAGEMENT | Facility: OTHER | Age: 77
End: 2022-11-11

## 2022-11-11 ENCOUNTER — OFFICE VISIT (OUTPATIENT)
Dept: MEDICAL GROUP | Facility: LAB | Age: 77
End: 2022-11-11
Payer: MEDICARE

## 2022-11-11 VITALS
DIASTOLIC BLOOD PRESSURE: 70 MMHG | SYSTOLIC BLOOD PRESSURE: 130 MMHG | HEIGHT: 64 IN | RESPIRATION RATE: 16 BRPM | WEIGHT: 180 LBS | BODY MASS INDEX: 30.73 KG/M2 | HEART RATE: 78 BPM | OXYGEN SATURATION: 94 % | TEMPERATURE: 96.8 F

## 2022-11-11 DIAGNOSIS — J20.8 VIRAL BRONCHITIS: ICD-10-CM

## 2022-11-11 LAB
EXTERNAL QUALITY CONTROL: NORMAL
FLUAV+FLUBV AG SPEC QL IA: NEGATIVE
INT CON NEG: NORMAL
INT CON NEG: NORMAL
INT CON POS: NORMAL
INT CON POS: NORMAL
SARS-COV+SARS-COV-2 AG RESP QL IA.RAPID: NEGATIVE

## 2022-11-11 PROCEDURE — 87804 INFLUENZA ASSAY W/OPTIC: CPT | Performed by: FAMILY MEDICINE

## 2022-11-11 PROCEDURE — 87426 SARSCOV CORONAVIRUS AG IA: CPT | Performed by: FAMILY MEDICINE

## 2022-11-11 PROCEDURE — 99214 OFFICE O/P EST MOD 30 MIN: CPT | Mod: CS | Performed by: FAMILY MEDICINE

## 2022-11-11 RX ORDER — BENZONATATE 100 MG/1
100 CAPSULE ORAL 3 TIMES DAILY PRN
Qty: 60 CAPSULE | Refills: 0 | Status: SHIPPED | OUTPATIENT
Start: 2022-11-11 | End: 2023-09-08

## 2022-11-11 RX ORDER — PREDNISONE 20 MG/1
40 TABLET ORAL DAILY
Qty: 10 TABLET | Refills: 0 | Status: SHIPPED | OUTPATIENT
Start: 2022-11-11 | End: 2022-11-16

## 2022-11-11 ASSESSMENT — FIBROSIS 4 INDEX: FIB4 SCORE: 1.82

## 2022-11-11 NOTE — PROGRESS NOTES
"Subjective:     CC: cough, congestion, headache    HPI:   Jackie is a 77F patient of Dr. Alvarado who presents today with cough, nasal congestion and headache for 5-6 days. No fevers. No trouble breathing. No sore throat.  Does have history of mild asthma.  Some improvement with OTC cough/cold medications.    Home covid negative test 3 days ago. No sick contacts  Covid booster up to date, had flu vaccine    Point-of-care COVID and flu negative.    Medications, past medical history, allergies, and social history have been reviewed and updated.      Objective:       Exam:  /70   Pulse 78   Temp 36 °C (96.8 °F)   Resp 16   Ht 1.626 m (5' 4.02\")   Wt 81.6 kg (180 lb)   SpO2 94%   BMI 30.88 kg/m²  Body mass index is 30.88 kg/m².    Constitutional: Alert. Well appearing. No distress.  Skin: Warm, dry, good turgor, no visible rashes.  Eye: Equal, round and reactive to light, conjunctiva clear, lids normal.  ENMT: Moist mucous membranes.  Oropharynx is clear.  Clear nasal rhinorrhea.  Tympanic membranes clear.  Respiratory: Normal effort.  Bilateral diffuse inspiratory and expiratory wheezing, cough with deep inhalation.  Cardiovascular: Regular rate and rhythm. Normal S1/S2. No murmurs, rubs or gallops.   Neuro: Moves all four extremities. No facial droop.  Psych: Answers questions appropriately. Normal affect and mood.      Assessment & Plan:     77 y.o. female with the following -     1. Viral bronchitis  Presents with 6 days of cough, congestion, wheezing, headaches.  Vitals reassuring.  Exam notable for diffuse inspiratory and expiratory wheezing, normal respiratory effort..  Home COVID test -3 days ago, rapid flu and COVID-negative here today.  Suspect viral bronchitis likely causing asthma exacerbation.  Will do burst of prednisone, Tessalon for cough.  Continue albuterol as needed.  Given order for CXR to do in 3 to 4 days if she is not improving.  Discussed reasons to seek care.  - benzonatate " (TESSALON) 100 MG Cap; Take 1 Capsule by mouth 3 times a day as needed for Cough.  Dispense: 60 Capsule; Refill: 0  - predniSONE (DELTASONE) 20 MG Tab; Take 2 Tablets by mouth every day for 5 days.  Dispense: 10 Tablet; Refill: 0  - DX-CHEST-2 VIEWS; Future  - POCT Influenza A/B  - POCT SARS-COV Antigen ROYCE Manual Result      Please note that this note was created using voice recognition software.

## 2022-11-14 ENCOUNTER — HOSPITAL ENCOUNTER (OUTPATIENT)
Dept: RADIOLOGY | Facility: MEDICAL CENTER | Age: 77
End: 2022-11-14
Attending: FAMILY MEDICINE
Payer: MEDICARE

## 2022-11-14 ENCOUNTER — TELEPHONE (OUTPATIENT)
Dept: MEDICAL GROUP | Facility: LAB | Age: 77
End: 2022-11-14
Payer: COMMERCIAL

## 2022-11-14 DIAGNOSIS — J20.8 VIRAL BRONCHITIS: ICD-10-CM

## 2022-11-14 PROCEDURE — 71046 X-RAY EXAM CHEST 2 VIEWS: CPT

## 2022-11-14 NOTE — TELEPHONE ENCOUNTER
PT LVM wanted to update you. She stated she is not feeling well. Sent pt a my chart for more information

## 2022-11-15 NOTE — TELEPHONE ENCOUNTER
Pt has improved a little still coughing. Did get some sleep last night. Just finished her medication.

## 2022-11-18 ENCOUNTER — OFFICE VISIT (OUTPATIENT)
Dept: MEDICAL GROUP | Facility: LAB | Age: 77
End: 2022-11-18
Payer: MEDICARE

## 2022-11-18 VITALS
HEIGHT: 64 IN | WEIGHT: 181.4 LBS | RESPIRATION RATE: 12 BRPM | BODY MASS INDEX: 30.97 KG/M2 | SYSTOLIC BLOOD PRESSURE: 136 MMHG | TEMPERATURE: 97.6 F | DIASTOLIC BLOOD PRESSURE: 62 MMHG | OXYGEN SATURATION: 98 % | HEART RATE: 74 BPM

## 2022-11-18 DIAGNOSIS — J20.8 VIRAL BRONCHITIS: ICD-10-CM

## 2022-11-18 DIAGNOSIS — J45.21 MILD INTERMITTENT ASTHMA WITH ACUTE EXACERBATION: ICD-10-CM

## 2022-11-18 PROCEDURE — 99214 OFFICE O/P EST MOD 30 MIN: CPT | Performed by: FAMILY MEDICINE

## 2022-11-18 RX ORDER — FLUTICASONE PROPIONATE 110 UG/1
1 AEROSOL, METERED RESPIRATORY (INHALATION) 2 TIMES DAILY
Qty: 12 G | Refills: 1 | Status: SHIPPED | OUTPATIENT
Start: 2022-11-18

## 2022-11-18 RX ORDER — INHALER, ASSIST DEVICES
1 SPACER (EA) MISCELLANEOUS ONCE
Qty: 1 EACH | Refills: 0 | Status: SHIPPED | OUTPATIENT
Start: 2022-11-18 | End: 2022-11-18

## 2022-11-18 ASSESSMENT — FIBROSIS 4 INDEX: FIB4 SCORE: 1.82

## 2022-11-18 NOTE — PROGRESS NOTES
"Subjective:     CC: Bronchitis follow up    HPI:   Jackie is a 77-year-old female patient with a history of asthma who presents for bronchitis follow-up.  She was initially seen 2 weeks ago with cough, congestion, wheezing.  COVID and flu testing negative.  Treated with a burst of prednisone, CXR was indicative of likely bronchitis.    She has slowly improved since then although still having mild cough and wheezing.  No fevers, not feeling acutely ill.    Medications, past medical history, allergies, and social history have been reviewed and updated.      Objective:       Exam:  /62 (BP Location: Right arm, Patient Position: Sitting, BP Cuff Size: Adult)   Pulse 74   Temp 36.4 °C (97.6 °F)   Resp 12   Ht 1.626 m (5' 4.02\")   Wt 82.3 kg (181 lb 6.4 oz)   SpO2 98%   BMI 31.12 kg/m²  Body mass index is 31.12 kg/m².    Constitutional: Alert. Well appearing. No distress.  Skin: Warm, dry, good turgor, no visible rashes.  Eye: Equal, round and reactive to light, conjunctiva clear, lids normal.  ENMT: Moist mucous membranes. Normal dentition.  Respiratory: Normal effort.  Diffuse bilateral inspiratory and expiratory wheezing.  Cardiovascular: Regular rate and rhythm. Normal S1/S2. No murmurs, rubs or gallops.   Neuro: Moves all four extremities. No facial droop.  Psych: Answers questions appropriately. Normal affect and mood.      Assessment & Plan:     77 y.o. female with the following -     1. Viral bronchitis  2. Mild intermittent asthma with acute exacerbation  Cough and wheezing continue.  Initially seen 2 weeks ago with symptoms consistent with viral bronchitis, CXR showed interstitial opacities consistent with likely bronchitis.  She is improving overall but given continued significant wheezing on exam suspect asthma exacerbation secondary to the recent viral respiratory infection.  She did already complete a 5-day burst of prednisone.  We will start Flovent, continue albuterol as needed.  Follow-up 1 " week and could consider repeat prednisone burst or which to ICS/LABA.  - fluticasone (FLOVENT HFA) 110 MCG/ACT Aerosol; Inhale 1 Puff 2 times a day.  Dispense: 12 g; Refill: 1  - Spacer/Aero-Holding Chambers (AEROCHAMBER MV) Misc; 1 Each one time for 1 dose.  Dispense: 1 Each; Refill: 0      Please note that this note was created using voice recognition software.

## 2022-11-21 NOTE — TELEPHONE ENCOUNTER
Received request via: Pharmacy    Was the patient seen in the last year in this department? Yes    Does the patient have an active prescription (recently filled or refills available) for medication(s) requested? No    Does the patient have skilled nursing Plus and need 100 day supply (blood pressure, diabetes and cholesterol meds only)? Patient does not have SCP    ATENOLOL 50 MG TABLET

## 2022-11-22 RX ORDER — ATENOLOL 50 MG/1
50 TABLET ORAL DAILY
Qty: 90 TABLET | Refills: 3 | Status: SHIPPED | OUTPATIENT
Start: 2022-11-22 | End: 2023-02-13 | Stop reason: SDUPTHER

## 2023-01-13 ENCOUNTER — OFFICE VISIT (OUTPATIENT)
Dept: MEDICAL GROUP | Facility: LAB | Age: 78
End: 2023-01-13
Payer: MEDICARE

## 2023-01-13 VITALS
TEMPERATURE: 97.5 F | HEIGHT: 64 IN | RESPIRATION RATE: 16 BRPM | BODY MASS INDEX: 30.73 KG/M2 | HEART RATE: 60 BPM | OXYGEN SATURATION: 96 % | SYSTOLIC BLOOD PRESSURE: 130 MMHG | WEIGHT: 180 LBS | DIASTOLIC BLOOD PRESSURE: 72 MMHG

## 2023-01-13 DIAGNOSIS — S09.90XA MINOR HEAD INJURY, INITIAL ENCOUNTER: ICD-10-CM

## 2023-01-13 PROCEDURE — 99214 OFFICE O/P EST MOD 30 MIN: CPT | Performed by: PHYSICIAN ASSISTANT

## 2023-01-13 ASSESSMENT — FIBROSIS 4 INDEX: FIB4 SCORE: 1.82

## 2023-01-15 ENCOUNTER — HOSPITAL ENCOUNTER (OUTPATIENT)
Dept: RADIOLOGY | Facility: MEDICAL CENTER | Age: 78
End: 2023-01-15
Attending: PHYSICIAN ASSISTANT
Payer: MEDICARE

## 2023-01-15 DIAGNOSIS — S09.90XA MINOR HEAD INJURY, INITIAL ENCOUNTER: ICD-10-CM

## 2023-01-15 PROCEDURE — 70450 CT HEAD/BRAIN W/O DYE: CPT

## 2023-01-16 DIAGNOSIS — R93.0 ABNORMAL CT SCAN OF HEAD: ICD-10-CM

## 2023-01-27 ENCOUNTER — APPOINTMENT (OUTPATIENT)
Dept: RADIOLOGY | Facility: MEDICAL CENTER | Age: 78
End: 2023-01-27
Attending: PHYSICIAN ASSISTANT
Payer: MEDICARE

## 2023-02-09 ENCOUNTER — HOSPITAL ENCOUNTER (OUTPATIENT)
Dept: RADIOLOGY | Facility: MEDICAL CENTER | Age: 78
End: 2023-02-09
Attending: PHYSICIAN ASSISTANT
Payer: MEDICARE

## 2023-02-09 DIAGNOSIS — R93.0 ABNORMAL CT SCAN OF HEAD: ICD-10-CM

## 2023-02-09 PROCEDURE — 70553 MRI BRAIN STEM W/O & W/DYE: CPT

## 2023-02-09 PROCEDURE — A9579 GAD-BASE MR CONTRAST NOS,1ML: HCPCS | Performed by: PHYSICIAN ASSISTANT

## 2023-02-09 PROCEDURE — 700117 HCHG RX CONTRAST REV CODE 255: Performed by: PHYSICIAN ASSISTANT

## 2023-02-09 RX ADMIN — GADOTERIDOL 15 ML: 279.3 INJECTION, SOLUTION INTRAVENOUS at 17:26

## 2023-02-10 DIAGNOSIS — I63.81 BASAL GANGLIA STROKE (HCC): ICD-10-CM

## 2023-03-07 SDOH — HEALTH STABILITY: PHYSICAL HEALTH: ON AVERAGE, HOW MANY DAYS PER WEEK DO YOU ENGAGE IN MODERATE TO STRENUOUS EXERCISE (LIKE A BRISK WALK)?: 5 DAYS

## 2023-03-07 SDOH — ECONOMIC STABILITY: FOOD INSECURITY: WITHIN THE PAST 12 MONTHS, THE FOOD YOU BOUGHT JUST DIDN'T LAST AND YOU DIDN'T HAVE MONEY TO GET MORE.: NEVER TRUE

## 2023-03-07 SDOH — HEALTH STABILITY: MENTAL HEALTH
STRESS IS WHEN SOMEONE FEELS TENSE, NERVOUS, ANXIOUS, OR CAN'T SLEEP AT NIGHT BECAUSE THEIR MIND IS TROUBLED. HOW STRESSED ARE YOU?: TO SOME EXTENT

## 2023-03-07 SDOH — HEALTH STABILITY: PHYSICAL HEALTH: ON AVERAGE, HOW MANY MINUTES DO YOU ENGAGE IN EXERCISE AT THIS LEVEL?: 50 MIN

## 2023-03-07 SDOH — ECONOMIC STABILITY: HOUSING INSECURITY
IN THE LAST 12 MONTHS, WAS THERE A TIME WHEN YOU DID NOT HAVE A STEADY PLACE TO SLEEP OR SLEPT IN A SHELTER (INCLUDING NOW)?: NO

## 2023-03-07 SDOH — ECONOMIC STABILITY: INCOME INSECURITY: IN THE LAST 12 MONTHS, WAS THERE A TIME WHEN YOU WERE NOT ABLE TO PAY THE MORTGAGE OR RENT ON TIME?: NO

## 2023-03-07 SDOH — ECONOMIC STABILITY: FOOD INSECURITY: WITHIN THE PAST 12 MONTHS, YOU WORRIED THAT YOUR FOOD WOULD RUN OUT BEFORE YOU GOT MONEY TO BUY MORE.: NEVER TRUE

## 2023-03-07 SDOH — ECONOMIC STABILITY: TRANSPORTATION INSECURITY
IN THE PAST 12 MONTHS, HAS THE LACK OF TRANSPORTATION KEPT YOU FROM MEDICAL APPOINTMENTS OR FROM GETTING MEDICATIONS?: YES

## 2023-03-07 SDOH — ECONOMIC STABILITY: HOUSING INSECURITY: IN THE LAST 12 MONTHS, HOW MANY PLACES HAVE YOU LIVED?: 1

## 2023-03-07 SDOH — ECONOMIC STABILITY: TRANSPORTATION INSECURITY
IN THE PAST 12 MONTHS, HAS LACK OF RELIABLE TRANSPORTATION KEPT YOU FROM MEDICAL APPOINTMENTS, MEETINGS, WORK OR FROM GETTING THINGS NEEDED FOR DAILY LIVING?: NO

## 2023-03-07 SDOH — ECONOMIC STABILITY: TRANSPORTATION INSECURITY
IN THE PAST 12 MONTHS, HAS LACK OF TRANSPORTATION KEPT YOU FROM MEETINGS, WORK, OR FROM GETTING THINGS NEEDED FOR DAILY LIVING?: NO

## 2023-03-07 SDOH — ECONOMIC STABILITY: INCOME INSECURITY: HOW HARD IS IT FOR YOU TO PAY FOR THE VERY BASICS LIKE FOOD, HOUSING, MEDICAL CARE, AND HEATING?: PATIENT DECLINED

## 2023-03-07 ASSESSMENT — SOCIAL DETERMINANTS OF HEALTH (SDOH)
HOW MANY DRINKS CONTAINING ALCOHOL DO YOU HAVE ON A TYPICAL DAY WHEN YOU ARE DRINKING: 1 OR 2
WITHIN THE PAST 12 MONTHS, YOU WORRIED THAT YOUR FOOD WOULD RUN OUT BEFORE YOU GOT THE MONEY TO BUY MORE: NEVER TRUE
IN A TYPICAL WEEK, HOW MANY TIMES DO YOU TALK ON THE PHONE WITH FAMILY, FRIENDS, OR NEIGHBORS?: MORE THAN THREE TIMES A WEEK
DO YOU BELONG TO ANY CLUBS OR ORGANIZATIONS SUCH AS CHURCH GROUPS UNIONS, FRATERNAL OR ATHLETIC GROUPS, OR SCHOOL GROUPS?: YES
HOW OFTEN DO YOU GET TOGETHER WITH FRIENDS OR RELATIVES?: MORE THAN THREE TIMES A WEEK
HOW OFTEN DO YOU ATTENT MEETINGS OF THE CLUB OR ORGANIZATION YOU BELONG TO?: 1 TO 4 TIMES PER YEAR
IN A TYPICAL WEEK, HOW MANY TIMES DO YOU TALK ON THE PHONE WITH FAMILY, FRIENDS, OR NEIGHBORS?: MORE THAN THREE TIMES A WEEK
HOW OFTEN DO YOU ATTEND CHURCH OR RELIGIOUS SERVICES?: NEVER
HOW OFTEN DO YOU ATTENT MEETINGS OF THE CLUB OR ORGANIZATION YOU BELONG TO?: 1 TO 4 TIMES PER YEAR
HOW HARD IS IT FOR YOU TO PAY FOR THE VERY BASICS LIKE FOOD, HOUSING, MEDICAL CARE, AND HEATING?: PATIENT DECLINED
HOW OFTEN DO YOU ATTEND CHURCH OR RELIGIOUS SERVICES?: NEVER
HOW OFTEN DO YOU GET TOGETHER WITH FRIENDS OR RELATIVES?: MORE THAN THREE TIMES A WEEK
DO YOU BELONG TO ANY CLUBS OR ORGANIZATIONS SUCH AS CHURCH GROUPS UNIONS, FRATERNAL OR ATHLETIC GROUPS, OR SCHOOL GROUPS?: YES
HOW OFTEN DO YOU HAVE A DRINK CONTAINING ALCOHOL: 2-3 TIMES A WEEK
HOW OFTEN DO YOU HAVE SIX OR MORE DRINKS ON ONE OCCASION: NEVER

## 2023-03-07 ASSESSMENT — LIFESTYLE VARIABLES
SKIP TO QUESTIONS 9-10: 1
AUDIT-C TOTAL SCORE: 3
HOW OFTEN DO YOU HAVE SIX OR MORE DRINKS ON ONE OCCASION: NEVER
HOW OFTEN DO YOU HAVE A DRINK CONTAINING ALCOHOL: 2-3 TIMES A WEEK
HOW MANY STANDARD DRINKS CONTAINING ALCOHOL DO YOU HAVE ON A TYPICAL DAY: 1 OR 2

## 2023-03-09 ENCOUNTER — OFFICE VISIT (OUTPATIENT)
Dept: MEDICAL GROUP | Facility: LAB | Age: 78
End: 2023-03-09
Payer: MEDICARE

## 2023-03-09 VITALS
BODY MASS INDEX: 31.07 KG/M2 | HEIGHT: 64 IN | DIASTOLIC BLOOD PRESSURE: 66 MMHG | WEIGHT: 182 LBS | TEMPERATURE: 97.4 F | RESPIRATION RATE: 15 BRPM | OXYGEN SATURATION: 96 % | SYSTOLIC BLOOD PRESSURE: 106 MMHG | HEART RATE: 71 BPM

## 2023-03-09 DIAGNOSIS — E78.1 HYPERTRIGLYCERIDEMIA: ICD-10-CM

## 2023-03-09 DIAGNOSIS — I63.81 BASAL GANGLIA STROKE (HCC): ICD-10-CM

## 2023-03-09 PROCEDURE — 99214 OFFICE O/P EST MOD 30 MIN: CPT | Performed by: FAMILY MEDICINE

## 2023-03-09 RX ORDER — ASPIRIN 81 MG/1
81 TABLET, CHEWABLE ORAL DAILY
Qty: 90 TABLET | Refills: 3
Start: 2023-03-09 | End: 2023-09-08

## 2023-03-09 RX ORDER — EZETIMIBE 10 MG/1
10 TABLET ORAL DAILY
Qty: 90 TABLET | Refills: 3 | Status: SHIPPED | OUTPATIENT
Start: 2023-03-09 | End: 2023-05-31 | Stop reason: SDUPTHER

## 2023-03-09 ASSESSMENT — FIBROSIS 4 INDEX: FIB4 SCORE: 1.82

## 2023-03-09 NOTE — PROGRESS NOTES
"Subjective:   Jackie Johnson is a 77 y.o. female here today for   No chief complaint on file.      #Basal ganglia stroke:  -Several weeks ago patient was seen in office after having intermittent episodes of dizziness.  MRI was completed at the time that did show chronic infarct of right basal ganglia/anterior limb of the internal capsule.  Patient also mention multifocal areas of T2 hyperintensities likely representing moderate chronic small vessel disease.    -Patient states that she is feeling well.  Denies any symptoms.  Denies any changes in vision, difficulty speaking, difficulty swallowing, numbness/tingling/weakness in upper or lower extremities, changes in hearing.  -Patient is unaware of any symptoms at any point I would resemble a stroke in her past.  -Patient does have a history of hypertension, hyperlipidemia which is being treated well and controlled with simvastatin as well as atenolol  -Denies any chest pain, palpitations, shortness of breath, or other symptoms otherwise.      #Hypertriglyceridemia:  -Elevated triglycerides into the 200s seen on labs.  Working on improving diet and exercise regiment.  Here to discuss other possible treatment measures.    Allergies   Allergen Reactions    Morphine      Lowers heart rate    Erythromycin Nausea    Hydrocodone-Acetaminophen     Mastisol Adhesive [Mastisol] Rash    Meperidine Hcl Unspecified     [08/08/09 UNKNOWN, PLEASE VERIFY]    Other Drug      \"All narcotics make me nauseated\"         Current medicines (including changes today)  Current Outpatient Medications   Medication Sig Dispense Refill    sertraline (ZOLOFT) 100 MG Tab Take 1 Tablet by mouth every day for 360 days. 90 Tablet 1    omeprazole (PRILOSEC) 20 MG delayed-release capsule Take 1 Capsule by mouth 2 times a day. 180 Capsule 1    atenolol (TENORMIN) 50 MG Tab Take 1 Tablet by mouth every day for 360 days. 90 Tablet 1    fluticasone (FLOVENT HFA) 110 MCG/ACT Aerosol Inhale 1 Puff 2 times " "a day. 12 g 1    benzonatate (TESSALON) 100 MG Cap Take 1 Capsule by mouth 3 times a day as needed for Cough. 60 Capsule 0    simvastatin (ZOCOR) 40 MG Tab Take 1 Tablet by mouth every evening for 360 days. 90 Tablet 3    albuterol 108 (90 Base) MCG/ACT Aero Soln inhalation aerosol Inhale 1 to 2 puffs by mouth every 4 to 6 hours as needed for wheezing and cough 8.5 g 2     No current facility-administered medications for this visit.     She  has a past medical history of Allergy, Anxiety, Arthritis, Depression, Dysrhythmia, GERD (gastroesophageal reflux disease), History of nonmelanoma skin cancer (03/21/2018), Hypertension, and Vaginal Papanicolaou smear not required due to history of hysterectomy (02/28/2013).    ROS   -See HPI     Objective:     Physical Exam:  /66   Pulse 71   Temp 36.3 °C (97.4 °F) (Temporal)   Resp 15   Ht 1.626 m (5' 4.02\")   Wt 82.6 kg (182 lb)   SpO2 96%  Body mass index is 31.22 kg/m².   Constitutional: Alert, no distress.  Skin: Warm, dry, good turgor, no rashes in visible areas.  Eye: Equal, round and reactive, conjunctiva clear, lids normal.  ENMT: TM's clear bilaterally, lips without lesions, good dentition, oropharynx clear.  Neck: Trachea midline, no masses, no thyromegaly. No cervical or supraclavicular lymphadenopathy.  Respiratory: Unlabored respiratory effort, lungs clear to auscultation, no wheezes, no rhonchi.  Cardiovascular: Normal S1, S2, no murmur, no edema.  Abdomen: Soft, non-tender, no masses, no hepatosplenomegaly.  Psych: Alert and oriented x3, normal affect and mood.    Assessment and Plan:     1. Basal ganglia stroke (HCC)  -Reviewed findings with patient, complete physical examination which shows no deficits.  At this time we will work on appropriate risk reduction management.  We will optimize dose of statin, begin aspirin.  Discussed appropriate diet and exercise regimen, strict and precautions given.    2. Hypertriglyceridemia  -In the light of " previous basal ganglia stroke we will work on further risk reduction management by treating hypertriglyceridemia by including Zetia and medication regiment.  Discussed appropriate to medication, medication compliance, side effects.  Discussed appropriate diet and exercise regiment.  We will recheck labs again in 6 months.  - ezetimibe (ZETIA) 10 MG Tab; Take 1 Tablet by mouth every day for 360 days.  Dispense: 90 Tablet; Refill: 3      Followup: No follow-ups on file.         PLEASE NOTE: This dictation was created using voice recognition software. I have made every reasonable attempt to correct obvious errors, but I expect that there are errors of grammar and possibly content that I did not discover before finalizing the note.

## 2023-03-23 ENCOUNTER — PATIENT MESSAGE (OUTPATIENT)
Dept: MEDICAL GROUP | Facility: LAB | Age: 78
End: 2023-03-23
Payer: COMMERCIAL

## 2023-03-23 DIAGNOSIS — M54.2 NECK PAIN: ICD-10-CM

## 2023-03-23 DIAGNOSIS — R42 DIZZINESS: ICD-10-CM

## 2023-04-25 ENCOUNTER — TELEPHONE (OUTPATIENT)
Dept: HEALTH INFORMATION MANAGEMENT | Facility: OTHER | Age: 78
End: 2023-04-25

## 2023-05-09 ENCOUNTER — DOCUMENTATION (OUTPATIENT)
Dept: HEALTH INFORMATION MANAGEMENT | Facility: OTHER | Age: 78
End: 2023-05-09
Payer: MEDICARE

## 2023-05-18 DIAGNOSIS — K21.9 GASTROESOPHAGEAL REFLUX DISEASE, UNSPECIFIED WHETHER ESOPHAGITIS PRESENT: ICD-10-CM

## 2023-05-31 DIAGNOSIS — K21.9 GASTROESOPHAGEAL REFLUX DISEASE, UNSPECIFIED WHETHER ESOPHAGITIS PRESENT: ICD-10-CM

## 2023-05-31 DIAGNOSIS — E78.1 HYPERTRIGLYCERIDEMIA: ICD-10-CM

## 2023-05-31 DIAGNOSIS — F33.0 MILD EPISODE OF RECURRENT MAJOR DEPRESSIVE DISORDER (HCC): ICD-10-CM

## 2023-05-31 RX ORDER — SIMVASTATIN 40 MG
40 TABLET ORAL EVERY EVENING
Qty: 100 TABLET | Refills: 3 | Status: SHIPPED | OUTPATIENT
Start: 2023-05-31 | End: 2023-07-17 | Stop reason: SDUPTHER

## 2023-05-31 RX ORDER — ATENOLOL 50 MG/1
50 TABLET ORAL DAILY
Qty: 100 TABLET | Refills: 3 | Status: SHIPPED | OUTPATIENT
Start: 2023-05-31 | End: 2024-05-25

## 2023-05-31 RX ORDER — OMEPRAZOLE 20 MG/1
20 CAPSULE, DELAYED RELEASE ORAL 2 TIMES DAILY
Qty: 180 CAPSULE | Refills: 1 | Status: SHIPPED | OUTPATIENT
Start: 2023-05-31 | End: 2023-09-08

## 2023-05-31 RX ORDER — SERTRALINE HYDROCHLORIDE 100 MG/1
100 TABLET, FILM COATED ORAL DAILY
Qty: 90 TABLET | Refills: 3 | Status: SHIPPED | OUTPATIENT
Start: 2023-05-31 | End: 2024-06-18

## 2023-05-31 RX ORDER — EZETIMIBE 10 MG/1
10 TABLET ORAL DAILY
Qty: 90 TABLET | Refills: 3 | Status: SHIPPED | OUTPATIENT
Start: 2023-05-31 | End: 2024-05-25

## 2023-05-31 NOTE — TELEPHONE ENCOUNTER
Received request via: Pharmacy    Was the patient seen in the last year in this department? Yes  LOV 03/09/2023  Does the patient have an active prescription (recently filled or refills available) for medication(s) requested? No    Does the patient have [facility] and need 100 day supply (blood pressure, diabetes and cholesterol meds only)? Yes, quantity updated to 100 days

## 2023-06-01 ENCOUNTER — PHARMACY VISIT (OUTPATIENT)
Dept: PHARMACY | Facility: MEDICAL CENTER | Age: 78
End: 2023-06-01
Payer: MEDICARE

## 2023-06-01 PROCEDURE — RXMED WILLOW AMBULATORY MEDICATION CHARGE: Performed by: FAMILY MEDICINE

## 2023-06-02 PROCEDURE — RXMED WILLOW AMBULATORY MEDICATION CHARGE: Performed by: FAMILY MEDICINE

## 2023-06-05 ENCOUNTER — PHARMACY VISIT (OUTPATIENT)
Dept: PHARMACY | Facility: MEDICAL CENTER | Age: 78
End: 2023-06-05
Payer: MEDICARE

## 2023-07-13 ENCOUNTER — TELEPHONE (OUTPATIENT)
Dept: MEDICAL GROUP | Facility: LAB | Age: 78
End: 2023-07-13
Payer: MEDICARE

## 2023-07-13 RX ORDER — KETOCONAZOLE 20 MG/G
CREAM TOPICAL
COMMUNITY
Start: 2023-07-06 | End: 2023-09-08

## 2023-07-13 RX ORDER — ASPIRIN 81 MG/1
81 TABLET ORAL DAILY
COMMUNITY
End: 2024-02-21

## 2023-07-13 NOTE — TELEPHONE ENCOUNTER
ANNUAL WELLNESS VISIT PRE-VISIT PLANNING    1.  Reviewed notes from the last office visit: Yes    2.  If any orders were ordered or intended to be done prior to visit (i.e. 6 mos follow-up), do we have results/consult notes or has patient scheduled?          Labs - Labs were not ordered at last office visit.  Note: If patient appointment is for lab review and patient did not complete labs, check with provider if OK to reschedule patient until labs completed.         Imaging - Imaging was not ordered at last office visit.         Referrals - Referral ordered, patient was seen and consult notes are in chart. Care Teams updated  NO.    3.  Immunizations were updated in Epic using Reconcile Outside Information activity? Yes  4.  Patient is due for the following Health Maintenance Topics:   Health Maintenance Due   Topic Date Due    Annual Wellness Visit  05/18/2022    BONE DENSITY  12/22/2022    COVID-19 Vaccine (6 - Pfizer series) 02/16/2023   5.  Reviewed/Updated the following with patient:          Preferred Pharmacy? Yes          Preferred Lab? Yes          Preferred Communication? Yes          Allergies? Yes          Medications? YES. Was Abstract Encounter opened and chart updated? YES    6.  Care Team Updated:          DME Company (gait device, O2, CPAP, etc.): N\A          Other Specialists (eye doctor, derm, GYN, cardiology, endo, etc): YES    7.  Patient was advised: “This is a free wellness visit. The provider will screen for medical conditions to help you stay healthy. If you have other concerns to address you may be asked to discuss these at a separate visit or there may be an additional fee.”     8.  AHA (Puls8) form printed for Provider? N/A

## 2023-07-14 ENCOUNTER — OFFICE VISIT (OUTPATIENT)
Dept: MEDICAL GROUP | Facility: LAB | Age: 78
End: 2023-07-14
Payer: MEDICARE

## 2023-07-14 VITALS
HEIGHT: 64 IN | RESPIRATION RATE: 16 BRPM | OXYGEN SATURATION: 93 % | BODY MASS INDEX: 30.73 KG/M2 | TEMPERATURE: 97 F | HEART RATE: 74 BPM | DIASTOLIC BLOOD PRESSURE: 56 MMHG | WEIGHT: 180 LBS | SYSTOLIC BLOOD PRESSURE: 120 MMHG

## 2023-07-14 DIAGNOSIS — Z00.00 ENCOUNTER FOR ANNUAL WELLNESS EXAM IN MEDICARE PATIENT: ICD-10-CM

## 2023-07-14 DIAGNOSIS — Z78.0 POSTMENOPAUSAL: ICD-10-CM

## 2023-07-14 DIAGNOSIS — D80.1 HYPOGAMMAGLOBULINEMIA (HCC): ICD-10-CM

## 2023-07-14 DIAGNOSIS — F33.9 EPISODE OF RECURRENT MAJOR DEPRESSIVE DISORDER, UNSPECIFIED DEPRESSION EPISODE SEVERITY (HCC): ICD-10-CM

## 2023-07-14 DIAGNOSIS — J45.909 UNCOMPLICATED ASTHMA, UNSPECIFIED ASTHMA SEVERITY, UNSPECIFIED WHETHER PERSISTENT: ICD-10-CM

## 2023-07-14 DIAGNOSIS — I47.10 SUPRAVENTRICULAR TACHYCARDIA, PAROXYSMAL (HCC): ICD-10-CM

## 2023-07-14 DIAGNOSIS — I10 ESSENTIAL HYPERTENSION: ICD-10-CM

## 2023-07-14 DIAGNOSIS — E78.5 HYPERLIPIDEMIA, UNSPECIFIED HYPERLIPIDEMIA TYPE: ICD-10-CM

## 2023-07-14 PROBLEM — L65.9 HAIR LOSS: Status: RESOLVED | Noted: 2021-10-27 | Resolved: 2023-07-14

## 2023-07-14 PROBLEM — K58.9 IRRITABLE BOWEL SYNDROME: Status: ACTIVE | Noted: 2023-07-14

## 2023-07-14 PROBLEM — R07.89 CHEST PRESSURE: Status: RESOLVED | Noted: 2022-07-11 | Resolved: 2023-07-14

## 2023-07-14 PROBLEM — F32.9 MAJOR DEPRESSIVE DISORDER, SINGLE EPISODE, UNSPECIFIED: Status: ACTIVE | Noted: 2023-07-14

## 2023-07-14 PROBLEM — F32.9 MAJOR DEPRESSIVE DISORDER, SINGLE EPISODE, UNSPECIFIED: Status: RESOLVED | Noted: 2023-07-14 | Resolved: 2023-07-14

## 2023-07-14 PROCEDURE — G0439 PPPS, SUBSEQ VISIT: HCPCS | Performed by: FAMILY MEDICINE

## 2023-07-14 PROCEDURE — 3078F DIAST BP <80 MM HG: CPT | Performed by: FAMILY MEDICINE

## 2023-07-14 PROCEDURE — 3074F SYST BP LT 130 MM HG: CPT | Performed by: FAMILY MEDICINE

## 2023-07-14 ASSESSMENT — ENCOUNTER SYMPTOMS: GENERAL WELL-BEING: GOOD

## 2023-07-14 ASSESSMENT — FIBROSIS 4 INDEX: FIB4 SCORE: 1.84

## 2023-07-14 ASSESSMENT — ACTIVITIES OF DAILY LIVING (ADL): BATHING_REQUIRES_ASSISTANCE: 0

## 2023-07-14 ASSESSMENT — PATIENT HEALTH QUESTIONNAIRE - PHQ9: CLINICAL INTERPRETATION OF PHQ2 SCORE: 0

## 2023-07-14 NOTE — PROGRESS NOTES
No chief complaint on file.      HPI:  Melania is a 78 y.o. here for Medicare Annual Wellness Visit      Patient Active Problem List    Diagnosis Date Noted    Chest pressure 07/11/2022    Asthma 07/11/2022    Hair loss 10/27/2021    Essential hypertension 01/31/2020    Hyperlipidemia 05/15/2018    Hypogammaglobulinemia (HCC) 08/01/2017    Major depressive disorder, recurrent episode (HCC) 09/30/2014    Bilateral primary osteoarthritis of knee 03/13/2008    Supraventricular tachycardia, paroxysmal (HCC) 10/31/2007    GERD (gastroesophageal reflux disease) 10/31/2007       Current Outpatient Medications   Medication Sig Dispense Refill    ketoconazole (NIZORAL) 2 % Cream APPLY TO AFFECTED AREAS ON FEET AND IN BETWEEN TOES TWICE A DAY AS NEEDED UNTIL CLEAR      aspirin 81 MG EC tablet Take 81 mg by mouth every day.      simvastatin (ZOCOR) 40 MG Tab TAKE 1 TABLET BY MOUTH EVERY EVENING  DAYS. 100 Tablet 3    simvastatin (ZOCOR) 40 MG Tab Take 1 Tablet by mouth every evening for 360 days. 100 Tablet 3    sertraline (ZOLOFT) 100 MG Tab Take 1 Tablet by mouth every day for 360 days. 90 Tablet 3    omeprazole (PRILOSEC) 20 MG delayed-release capsule Take 1 Capsule by mouth 2 times a day. 180 Capsule 1    atenolol (TENORMIN) 50 MG Tab Take 1 Tablet by mouth every day for 360 days. 100 Tablet 3    ezetimibe (ZETIA) 10 MG Tab Take 1 Tablet by mouth every day for 360 days. 90 Tablet 3    aspirin (ASA) 81 MG Chew Tab chewable tablet Chew 1 Tablet every day for 360 days. 90 Tablet 3    fluticasone (FLOVENT HFA) 110 MCG/ACT Aerosol Inhale 1 Puff 2 times a day. 12 g 1    benzonatate (TESSALON) 100 MG Cap Take 1 Capsule by mouth 3 times a day as needed for Cough. 60 Capsule 0    albuterol 108 (90 Base) MCG/ACT Aero Soln inhalation aerosol Inhale 1 to 2 puffs by mouth every 4 to 6 hours as needed for wheezing and cough 8.5 g 2     No current facility-administered medications for this visit.        Patient is taking  medications as noted in medication list.  Current supplements as per medication list.     Allergies: Morphine, Erythromycin, Hydrocodone-acetaminophen, Mastisol adhesive [mastisol], Meperidine hcl, and Other drug    Current social contact/activities: Spending time with friends in neighborhood. Exercise groups.      Is patient current with immunizations? Yes.    She  reports that she has quit smoking. Her smoking use included cigarettes. She has a 35.00 pack-year smoking history. She has never used smokeless tobacco. She reports current alcohol use of about 6.0 oz of alcohol per week. She reports that she does not use drugs.  Counseling given: Not Answered      ROS:    Gait: Uses no assistive device   Ostomy: No   Other tubes: No   Amputations: No   Chronic oxygen use No   Last eye exam Yearly   Wears hearing aids: No   : Denies any urinary leakage during the last 6 months    Screening:    Depression Screening  Little interest or pleasure in doing things?  0 - not at all  Feeling down, depressed, or hopeless? 0 - not at all  Patient Health Questionnaire Score: 0    If depressive symptoms identified deferred to follow up visit unless specifically addressed in assessment and plan.    Interpretation of PHQ-9 Total Score   Score Severity   1-4 No Depression   5-9 Mild Depression   10-14 Moderate Depression   15-19 Moderately Severe Depression   20-27 Severe Depression    Screening for Cognitive Impairment  Three Minute Recall (daughter, heaven, mountain)  2/3    Tito clock face with all 12 numbers and set the hands to show 10 past 11.  Yes    If cognitive concerns identified, deferred for follow up unless specifically addressed in assessment and plan.    Fall Risk Assessment  Has the patient had two or more falls in the last year or any fall with injury in the last year?  No  If fall risk identified, deferred for follow up unless specifically addressed in assessment and plan.    Safety Assessment  Throw rugs on floor.   No  Handrails on all stairs.  Yes  Good lighting in all hallways.  Yes  Difficulty hearing.  No  Patient counseled about all safety risks that were identified.    Functional Assessment ADLs  Are there any barriers preventing you from cooking for yourself or meeting nutritional needs?  No.    Are there any barriers preventing you from driving safely or obtaining transportation?  No.    Are there any barriers preventing you from using a telephone or calling for help?  No.    Are there any barriers preventing you from shopping?  No.    Are there any barriers preventing you from taking care of your own finances?  No.    Are there any barriers preventing you from managing your medications?  No.    Are there any barriers preventing you from showering, bathing or dressing yourself?  No.    Are you currently engaging in any exercise or physical activity?  Yes.     What is your perception of your health?  Good.    Advance Care Planning  Do you have an Advance Directive, Living Will, Durable Power of , or POLST? Yes  Advance Directive Living Will Durable Power of    is not on file - instructed patient to bring in a copy to scan into their chart    Health Maintenance Summary            Overdue - Annual Wellness Visit (Every 366 Days) Overdue since 5/18/2022 05/17/2021  Done - Darwin petersen M.D. from [place] completed exam              Overdue - BONE DENSITY (Every 5 Years) Overdue since 12/22/2022 12/22/2017  Done    12/22/2017  DS-BONE DENSITY STUDY (DEXA)    08/21/2012  DS-BONE DENSITY STUDY (DEXA)              Overdue - COVID-19 Vaccine (6 - Pfizer series) Overdue since 2/16/2023      10/16/2022  Imm Admin: PFIZER BIVALENT SARS-COV-2 VACCINE (12+)    04/11/2022  Imm Admin: PFIZER GOMEZ CAP SARS-COV-2 VACCINATION (12+)    08/25/2021  Imm Admin: PFIZER PURPLE CAP SARS-COV-2 VACCINATION (12+)    02/25/2021  Imm Admin: PFIZER PURPLE CAP SARS-COV-2 VACCINATION (12+)    02/04/2021  Imm Admin: PFIZER  PURPLE CAP SARS-COV-2 VACCINATION (12+)              IMM INFLUENZA (1) Next due on 9/1/2023 08/23/2022  Imm Admin: Influenza Vaccine Adult HD    10/04/2021  Imm Admin: Influenza Vaccine Adult HD    09/08/2020  Imm Admin: Influenza Vaccine Adult HD    10/23/2019  Imm Admin: Influenza Vaccine Quad Inj (Pf)    09/14/2018  Imm Admin: Influenza Vaccine Adult HD    Only the first 5 history entries have been loaded, but more history exists.              IMM DTaP/Tdap/Td Vaccine (3 - Td or Tdap) Next due on 8/27/2028 08/27/2018  Imm Admin: Tdap Vaccine    09/18/2008  Imm Admin: Tdap Vaccine    02/01/2006  Imm Admin: TD Vaccine              HEPATITIS C SCREENING  Addressed      03/11/2008  Reason not specified              IMM PNEUMOCOCCAL VACCINE: 65+ Years (Series Information) Completed      08/02/2017  Imm Admin: Pneumococcal polysaccharide vaccine (PPSV-23)    06/03/2015  Imm Admin: Pneumococcal Conjugate Vaccine (Prevnar/PCV-13)    06/12/2013  Imm Admin: Pneumococcal polysaccharide vaccine (PPSV-23)              IMM ZOSTER VACCINES (Series Information) Completed      10/24/2022  Imm Admin: Zoster Vaccine Recombinant (RZV) (SHINGRIX)    08/15/2022  Imm Admin: Zoster Vaccine Recombinant (RZV) (SHINGRIX)    02/06/2009  Imm Admin: Zoster Vaccine Live (ZVL) (Zostavax) - HISTORICAL DATA              IMM HEP B VACCINE (Series Information) Aged Out      No completion history exists for this topic.              HPV Vaccines (Series Information) Aged Out      No completion history exists for this topic.              IMM MENINGOCOCCAL ACWY VACCINE (Series Information) Aged Out      No completion history exists for this topic.              Discontinued - MAMMOGRAM  Discontinued        Frequency changed to Never automatically (Topic No Longer Applies)    11/03/2022  MA-SCREENING MAMMO BILAT W/TOMOSYNTHESIS W/CAD    11/01/2021  [id number]    09/18/2019  [id number]    09/12/2018   "[id number]    Only the first 5 history entries have been loaded, but more history exists.              Discontinued - COLORECTAL CANCER SCREENING  Discontinued        Frequency changed to Never automatically (Topic No Longer Applies)    04/06/2022  Referral to GI for Colonoscopy    06/03/2015  REFERRAL TO GI FOR COLONOSCOPY                    Patient Care Team:  Saeid Alvarado M.D. as PCP - General (Family Medicine)  Sundar Flores M.D. (Dermatology)  John Webster D.P.M. as Surgeon (Podiatry)  OKSANA De Leon (Gastroenterology)    Social History     Tobacco Use    Smoking status: Former     Packs/day: 1.00     Years: 35.00     Pack years: 35.00     Types: Cigarettes    Smokeless tobacco: Never   Vaping Use    Vaping Use: Never used   Substance Use Topics    Alcohol use: Yes     Alcohol/week: 6.0 oz     Types: 10 Glasses of wine per week     Comment: occ    Drug use: No     Family History   Problem Relation Age of Onset    Cancer Father         Prostate    Diabetes Father     Heart Disease Father     Colorectal Cancer Maternal Grandfather     Glaucoma Maternal Grandfather     Heart Disease Maternal Grandmother      She  has a past medical history of Allergy, Anxiety, Arthritis, Asthma, Depression, Dysrhythmia, GERD (gastroesophageal reflux disease), History of nonmelanoma skin cancer (03/21/2018), Hypertension, and Vaginal Papanicolaou smear not required due to history of hysterectomy (02/28/2013).   Past Surgical History:   Procedure Laterality Date    ABDOMINAL HYSTERECTOMY TOTAL      ARTHROPLASTY      EYE SURGERY      OTHER ORTHOPEDIC SURGERY Bilateral     knee replacements    TUBAL COAGULATION LAPAROSCOPIC BILATERAL         Exam:   /56   Pulse 74   Temp 36.1 °C (97 °F) (Temporal)   Resp 16   Ht 1.626 m (5' 4.02\")   Wt 81.6 kg (180 lb)   SpO2 93%  Body mass index is 30.88 kg/m².    Hearing excellent.    Dentition good  Alert, oriented in no acute " distress.  Eye contact is good, speech goal directed, affect calm    Assessment and Plan. The following treatment and monitoring plan is recommended:      1. Encounter for annual wellness exam in Medicare patient  -Reviewed all screenings, labs, vaccinations.  Routine anticipatory guidance given.  Patient will follow for annual wellness visit on yearly basis.  - Subsequent Annual Wellness Visit - Includes PPPS ()    2. Uncomplicated asthma, unspecified asthma severity, unspecified whether persistent  -This problem is chronic, stable, and monitored appropriately by history/labs/imaging as needed, and is well-controlled on the current regimen.  Please continue current regimen: Continue albuterol on an as-needed basis.    3. Essential hypertension  -This problem is chronic, stable, and monitored appropriately by history/labs/imaging as needed, and is well-controlled on the current regimen.  Please continue current regimen: Continue with atenolol\.  Continue with appropriate diet and exercise regimen.  - Comp Metabolic Panel; Future    4. Hypogammaglobulinemia (HCC)  -This problem is chronic, stable, and monitored appropriately by history/labs/imaging as needed, and is well-controlled on the current regimen.  Please continue current regimen: Continue monitoring symptoms.  Continue checking labs on a yearly basis.  - CBC WITH DIFFERENTIAL; Future    5. Episode of recurrent major depressive disorder, unspecified depression episode severity (HCC)  -This problem is chronic, stable, and monitored appropriately by history/labs/imaging as needed, and is well-controlled on the current regimen.  Please continue current regimen: Continue Zoloft 100 mg daily.  Follow-up as needed.    6. Supraventricular tachycardia, paroxysmal (HCC)  -This problem is chronic, stable, and monitored appropriately by history/labs/imaging as needed, and is well-controlled on the current regimen.  Please continue current regimen: Continue control  with atenolol.  Continue monitoring symptoms, follow-up if needed.    7. Hyperlipidemia, unspecified hyperlipidemia type  -This problem is chronic, stable, and monitored appropriately by history/labs/imaging as needed, and is well-controlled on the current regimen.  Please continue current regimen: We will continue with both simvastatin as well as Zetia given slightly elevated triglycerides on labs today.  Continue appropriate diet and exercise regimen.  - Lipid Profile; Future    8. Postmenopausal  - DS-BONE DENSITY STUDY (DEXA); Future    Services suggested: No services needed at this time  Health Care Screening recommendations as per orders if indicated.  Referrals offered: PT/OT/Nutrition counseling/Behavioral Health/Smoking cessation as per orders if indicated.    Discussion today about general wellness and lifestyle habits:    Prevent falls and reduce trip hazards; Cautioned about securing or removing rugs.  Have a working fire alarm and carbon monoxide detector;   Engage in regular physical activity and social activities     Follow-up: No follow-ups on file.

## 2023-07-17 RX ORDER — SIMVASTATIN 40 MG
40 TABLET ORAL EVERY EVENING
Qty: 100 TABLET | Refills: 3 | Status: SHIPPED | OUTPATIENT
Start: 2023-07-17 | End: 2024-08-20

## 2023-07-20 ENCOUNTER — HOSPITAL ENCOUNTER (OUTPATIENT)
Dept: LAB | Facility: MEDICAL CENTER | Age: 78
End: 2023-07-20
Attending: FAMILY MEDICINE
Payer: MEDICARE

## 2023-07-20 DIAGNOSIS — I10 ESSENTIAL HYPERTENSION: ICD-10-CM

## 2023-07-20 DIAGNOSIS — E78.5 HYPERLIPIDEMIA, UNSPECIFIED HYPERLIPIDEMIA TYPE: ICD-10-CM

## 2023-07-20 DIAGNOSIS — D80.1 HYPOGAMMAGLOBULINEMIA (HCC): ICD-10-CM

## 2023-07-20 LAB
ALBUMIN SERPL BCP-MCNC: 4.1 G/DL (ref 3.2–4.9)
ALBUMIN/GLOB SERPL: 1.7 G/DL
ALP SERPL-CCNC: 70 U/L (ref 30–99)
ALT SERPL-CCNC: 12 U/L (ref 2–50)
ANION GAP SERPL CALC-SCNC: 10 MMOL/L (ref 7–16)
AST SERPL-CCNC: 14 U/L (ref 12–45)
BASOPHILS # BLD AUTO: 1.2 % (ref 0–1.8)
BASOPHILS # BLD: 0.07 K/UL (ref 0–0.12)
BILIRUB SERPL-MCNC: 1 MG/DL (ref 0.1–1.5)
BUN SERPL-MCNC: 14 MG/DL (ref 8–22)
CALCIUM ALBUM COR SERPL-MCNC: 8.9 MG/DL (ref 8.5–10.5)
CALCIUM SERPL-MCNC: 9 MG/DL (ref 8.4–10.2)
CHLORIDE SERPL-SCNC: 105 MMOL/L (ref 96–112)
CHOLEST SERPL-MCNC: 132 MG/DL (ref 100–199)
CO2 SERPL-SCNC: 26 MMOL/L (ref 20–33)
CREAT SERPL-MCNC: 0.55 MG/DL (ref 0.5–1.4)
EOSINOPHIL # BLD AUTO: 0.36 K/UL (ref 0–0.51)
EOSINOPHIL NFR BLD: 6.1 % (ref 0–6.9)
ERYTHROCYTE [DISTWIDTH] IN BLOOD BY AUTOMATED COUNT: 47.5 FL (ref 35.9–50)
FASTING STATUS PATIENT QL REPORTED: NORMAL
GFR SERPLBLD CREATININE-BSD FMLA CKD-EPI: 95 ML/MIN/1.73 M 2
GLOBULIN SER CALC-MCNC: 2.4 G/DL (ref 1.9–3.5)
GLUCOSE SERPL-MCNC: 97 MG/DL (ref 65–99)
HCT VFR BLD AUTO: 45.3 % (ref 37–47)
HDLC SERPL-MCNC: 58 MG/DL
HGB BLD-MCNC: 14.5 G/DL (ref 12–16)
IMM GRANULOCYTES # BLD AUTO: 0.01 K/UL (ref 0–0.11)
IMM GRANULOCYTES NFR BLD AUTO: 0.2 % (ref 0–0.9)
LDLC SERPL CALC-MCNC: 53 MG/DL
LYMPHOCYTES # BLD AUTO: 1.17 K/UL (ref 1–4.8)
LYMPHOCYTES NFR BLD: 19.7 % (ref 22–41)
MCH RBC QN AUTO: 30.7 PG (ref 27–33)
MCHC RBC AUTO-ENTMCNC: 32 G/DL (ref 32.2–35.5)
MCV RBC AUTO: 95.8 FL (ref 81.4–97.8)
MONOCYTES # BLD AUTO: 0.38 K/UL (ref 0–0.85)
MONOCYTES NFR BLD AUTO: 6.4 % (ref 0–13.4)
NEUTROPHILS # BLD AUTO: 3.94 K/UL (ref 1.82–7.42)
NEUTROPHILS NFR BLD: 66.4 % (ref 44–72)
NRBC # BLD AUTO: 0 K/UL
NRBC BLD-RTO: 0 /100 WBC (ref 0–0.2)
PLATELET # BLD AUTO: 188 K/UL (ref 164–446)
PMV BLD AUTO: 10.2 FL (ref 9–12.9)
POTASSIUM SERPL-SCNC: 4.2 MMOL/L (ref 3.6–5.5)
PROT SERPL-MCNC: 6.5 G/DL (ref 6–8.2)
RBC # BLD AUTO: 4.73 M/UL (ref 4.2–5.4)
SODIUM SERPL-SCNC: 141 MMOL/L (ref 135–145)
TRIGL SERPL-MCNC: 106 MG/DL (ref 0–149)
WBC # BLD AUTO: 5.9 K/UL (ref 4.8–10.8)

## 2023-07-20 PROCEDURE — 85025 COMPLETE CBC W/AUTO DIFF WBC: CPT

## 2023-07-20 PROCEDURE — 80053 COMPREHEN METABOLIC PANEL: CPT

## 2023-07-20 PROCEDURE — 36415 COLL VENOUS BLD VENIPUNCTURE: CPT

## 2023-07-20 PROCEDURE — 80061 LIPID PANEL: CPT

## 2023-09-08 ENCOUNTER — APPOINTMENT (OUTPATIENT)
Dept: RADIOLOGY | Facility: MEDICAL CENTER | Age: 78
End: 2023-09-08
Attending: EMERGENCY MEDICINE
Payer: MEDICARE

## 2023-09-08 ENCOUNTER — HOSPITAL ENCOUNTER (OUTPATIENT)
Facility: MEDICAL CENTER | Age: 78
End: 2023-09-09
Attending: EMERGENCY MEDICINE | Admitting: HOSPITALIST
Payer: MEDICARE

## 2023-09-08 ENCOUNTER — HOSPITAL ENCOUNTER (OUTPATIENT)
Dept: RADIOLOGY | Facility: MEDICAL CENTER | Age: 78
End: 2023-09-08
Attending: FAMILY MEDICINE
Payer: MEDICARE

## 2023-09-08 DIAGNOSIS — R11.2 NAUSEA AND VOMITING, UNSPECIFIED VOMITING TYPE: ICD-10-CM

## 2023-09-08 DIAGNOSIS — Z78.0 POSTMENOPAUSAL: ICD-10-CM

## 2023-09-08 PROBLEM — E66.9 DIABETES MELLITUS TYPE 2 IN OBESE: Status: ACTIVE | Noted: 2023-09-08

## 2023-09-08 PROBLEM — R19.7 DIARRHEA: Status: ACTIVE | Noted: 2023-09-08

## 2023-09-08 PROBLEM — E11.69 DIABETES MELLITUS TYPE 2 IN OBESE: Status: ACTIVE | Noted: 2023-09-08

## 2023-09-08 LAB
ALBUMIN SERPL BCP-MCNC: 4.2 G/DL (ref 3.2–4.9)
ALBUMIN/GLOB SERPL: 1.5 G/DL
ALP SERPL-CCNC: 80 U/L (ref 30–99)
ALT SERPL-CCNC: 21 U/L (ref 2–50)
ANION GAP SERPL CALC-SCNC: 16 MMOL/L (ref 7–16)
AST SERPL-CCNC: 30 U/L (ref 12–45)
BASOPHILS # BLD AUTO: 1 % (ref 0–1.8)
BASOPHILS # BLD: 0.1 K/UL (ref 0–0.12)
BILIRUB SERPL-MCNC: 0.5 MG/DL (ref 0.1–1.5)
BUN SERPL-MCNC: 15 MG/DL (ref 8–22)
CALCIUM ALBUM COR SERPL-MCNC: 9.2 MG/DL (ref 8.5–10.5)
CALCIUM SERPL-MCNC: 9.4 MG/DL (ref 8.4–10.2)
CHLORIDE SERPL-SCNC: 103 MMOL/L (ref 96–112)
CO2 SERPL-SCNC: 24 MMOL/L (ref 20–33)
CREAT SERPL-MCNC: 0.52 MG/DL (ref 0.5–1.4)
CRP SERPL HS-MCNC: 0.63 MG/DL (ref 0–0.75)
EKG IMPRESSION: NORMAL
EOSINOPHIL # BLD AUTO: 0.19 K/UL (ref 0–0.51)
EOSINOPHIL NFR BLD: 2 % (ref 0–6.9)
ERYTHROCYTE [DISTWIDTH] IN BLOOD BY AUTOMATED COUNT: 45.1 FL (ref 35.9–50)
GFR SERPLBLD CREATININE-BSD FMLA CKD-EPI: 96 ML/MIN/1.73 M 2
GLOBULIN SER CALC-MCNC: 2.8 G/DL (ref 1.9–3.5)
GLUCOSE BLD STRIP.AUTO-MCNC: 112 MG/DL (ref 65–99)
GLUCOSE SERPL-MCNC: 140 MG/DL (ref 65–99)
HCT VFR BLD AUTO: 45.5 % (ref 37–47)
HGB BLD-MCNC: 14.9 G/DL (ref 12–16)
LACTATE SERPL-SCNC: 1.6 MMOL/L (ref 0.5–2)
LIPASE SERPL-CCNC: 24 U/L (ref 11–82)
LYMPHOCYTES # BLD AUTO: 1.82 K/UL (ref 1–4.8)
LYMPHOCYTES NFR BLD: 19 % (ref 22–41)
MANUAL DIFF BLD: NORMAL
MCH RBC QN AUTO: 30.8 PG (ref 27–33)
MCHC RBC AUTO-ENTMCNC: 32.7 G/DL (ref 32.2–35.5)
MCV RBC AUTO: 94 FL (ref 81.4–97.8)
MONOCYTES # BLD AUTO: 0.29 K/UL (ref 0–0.85)
MONOCYTES NFR BLD AUTO: 3 % (ref 0–13.4)
NEUTROPHILS # BLD AUTO: 7.2 K/UL (ref 1.82–7.42)
NEUTROPHILS NFR BLD: 75 % (ref 44–72)
NRBC # BLD AUTO: 0 K/UL
NRBC BLD-RTO: 0 /100 WBC (ref 0–0.2)
PLATELET # BLD AUTO: 185 K/UL (ref 164–446)
PLATELET BLD QL SMEAR: NORMAL
PMV BLD AUTO: 11.3 FL (ref 9–12.9)
POTASSIUM SERPL-SCNC: 3.8 MMOL/L (ref 3.6–5.5)
PROCALCITONIN SERPL-MCNC: 0.03 NG/ML
PROT SERPL-MCNC: 7 G/DL (ref 6–8.2)
RBC # BLD AUTO: 4.84 M/UL (ref 4.2–5.4)
RBC BLD AUTO: NORMAL
SODIUM SERPL-SCNC: 143 MMOL/L (ref 135–145)
TROPONIN T SERPL-MCNC: 7 NG/L (ref 6–19)
WBC # BLD AUTO: 9.6 K/UL (ref 4.8–10.8)

## 2023-09-08 PROCEDURE — 83630 LACTOFERRIN FECAL (QUAL): CPT

## 2023-09-08 PROCEDURE — 83690 ASSAY OF LIPASE: CPT

## 2023-09-08 PROCEDURE — 84484 ASSAY OF TROPONIN QUANT: CPT

## 2023-09-08 PROCEDURE — 85007 BL SMEAR W/DIFF WBC COUNT: CPT

## 2023-09-08 PROCEDURE — 99223 1ST HOSP IP/OBS HIGH 75: CPT | Mod: AI | Performed by: HOSPITALIST

## 2023-09-08 PROCEDURE — 82270 OCCULT BLOOD FECES: CPT

## 2023-09-08 PROCEDURE — G0378 HOSPITAL OBSERVATION PER HR: HCPCS

## 2023-09-08 PROCEDURE — 93005 ELECTROCARDIOGRAM TRACING: CPT | Performed by: EMERGENCY MEDICINE

## 2023-09-08 PROCEDURE — 71045 X-RAY EXAM CHEST 1 VIEW: CPT

## 2023-09-08 PROCEDURE — 94760 N-INVAS EAR/PLS OXIMETRY 1: CPT

## 2023-09-08 PROCEDURE — 96375 TX/PRO/DX INJ NEW DRUG ADDON: CPT

## 2023-09-08 PROCEDURE — 84145 PROCALCITONIN (PCT): CPT

## 2023-09-08 PROCEDURE — 96376 TX/PRO/DX INJ SAME DRUG ADON: CPT

## 2023-09-08 PROCEDURE — 700111 HCHG RX REV CODE 636 W/ 250 OVERRIDE (IP): Performed by: HOSPITALIST

## 2023-09-08 PROCEDURE — 80053 COMPREHEN METABOLIC PANEL: CPT

## 2023-09-08 PROCEDURE — 82962 GLUCOSE BLOOD TEST: CPT

## 2023-09-08 PROCEDURE — 36415 COLL VENOUS BLD VENIPUNCTURE: CPT

## 2023-09-08 PROCEDURE — 700105 HCHG RX REV CODE 258: Performed by: EMERGENCY MEDICINE

## 2023-09-08 PROCEDURE — 99285 EMERGENCY DEPT VISIT HI MDM: CPT

## 2023-09-08 PROCEDURE — 83036 HEMOGLOBIN GLYCOSYLATED A1C: CPT

## 2023-09-08 PROCEDURE — 85025 COMPLETE CBC W/AUTO DIFF WBC: CPT

## 2023-09-08 PROCEDURE — 700111 HCHG RX REV CODE 636 W/ 250 OVERRIDE (IP): Mod: JZ | Performed by: EMERGENCY MEDICINE

## 2023-09-08 PROCEDURE — 77080 DXA BONE DENSITY AXIAL: CPT

## 2023-09-08 PROCEDURE — 86140 C-REACTIVE PROTEIN: CPT

## 2023-09-08 PROCEDURE — 87040 BLOOD CULTURE FOR BACTERIA: CPT | Mod: 91

## 2023-09-08 PROCEDURE — 83605 ASSAY OF LACTIC ACID: CPT

## 2023-09-08 PROCEDURE — 96365 THER/PROPH/DIAG IV INF INIT: CPT

## 2023-09-08 PROCEDURE — 96374 THER/PROPH/DIAG INJ IV PUSH: CPT

## 2023-09-08 PROCEDURE — 700105 HCHG RX REV CODE 258: Performed by: HOSPITALIST

## 2023-09-08 RX ORDER — IBUPROFEN 200 MG
600 TABLET ORAL EVERY 6 HOURS PRN
COMMUNITY
End: 2024-02-21

## 2023-09-08 RX ORDER — ACETAMINOPHEN 325 MG/1
650 TABLET ORAL EVERY 6 HOURS PRN
Status: DISCONTINUED | OUTPATIENT
Start: 2023-09-08 | End: 2023-09-09 | Stop reason: HOSPADM

## 2023-09-08 RX ORDER — OMEPRAZOLE 20 MG/1
40 CAPSULE, DELAYED RELEASE ORAL EVERY EVENING
Status: DISCONTINUED | OUTPATIENT
Start: 2023-09-08 | End: 2023-09-09 | Stop reason: HOSPADM

## 2023-09-08 RX ORDER — DEXTROSE MONOHYDRATE 25 G/50ML
25 INJECTION, SOLUTION INTRAVENOUS
Status: DISCONTINUED | OUTPATIENT
Start: 2023-09-08 | End: 2023-09-09 | Stop reason: HOSPADM

## 2023-09-08 RX ORDER — OMEPRAZOLE 20 MG/1
40 CAPSULE, DELAYED RELEASE ORAL EVERY EVENING
COMMUNITY
End: 2023-09-13 | Stop reason: SDUPTHER

## 2023-09-08 RX ORDER — AMOXICILLIN 250 MG
2 CAPSULE ORAL 2 TIMES DAILY
Status: DISCONTINUED | OUTPATIENT
Start: 2023-09-08 | End: 2023-09-09 | Stop reason: HOSPADM

## 2023-09-08 RX ORDER — ONDANSETRON 4 MG/1
4 TABLET, ORALLY DISINTEGRATING ORAL EVERY 4 HOURS PRN
Status: DISCONTINUED | OUTPATIENT
Start: 2023-09-08 | End: 2023-09-08

## 2023-09-08 RX ORDER — FLUTICASONE PROPIONATE 110 UG/1
1 AEROSOL, METERED RESPIRATORY (INHALATION) 2 TIMES DAILY
Status: DISCONTINUED | OUTPATIENT
Start: 2023-09-08 | End: 2023-09-09 | Stop reason: HOSPADM

## 2023-09-08 RX ORDER — ACETAMINOPHEN 325 MG/1
650 TABLET ORAL ONCE
Status: DISCONTINUED | OUTPATIENT
Start: 2023-09-08 | End: 2023-09-09 | Stop reason: HOSPADM

## 2023-09-08 RX ORDER — ONDANSETRON 2 MG/ML
4 INJECTION INTRAMUSCULAR; INTRAVENOUS EVERY 4 HOURS PRN
Status: DISCONTINUED | OUTPATIENT
Start: 2023-09-08 | End: 2023-09-08

## 2023-09-08 RX ORDER — METRONIDAZOLE 500 MG/100ML
500 INJECTION, SOLUTION INTRAVENOUS EVERY 8 HOURS
Status: DISCONTINUED | OUTPATIENT
Start: 2023-09-08 | End: 2023-09-09

## 2023-09-08 RX ORDER — ONDANSETRON 2 MG/ML
4 INJECTION INTRAMUSCULAR; INTRAVENOUS ONCE
Status: COMPLETED | OUTPATIENT
Start: 2023-09-08 | End: 2023-09-08

## 2023-09-08 RX ORDER — ATENOLOL 25 MG/1
50 TABLET ORAL DAILY
Status: DISCONTINUED | OUTPATIENT
Start: 2023-09-09 | End: 2023-09-09 | Stop reason: HOSPADM

## 2023-09-08 RX ORDER — SODIUM CHLORIDE 9 MG/ML
1000 INJECTION, SOLUTION INTRAVENOUS ONCE
Status: COMPLETED | OUTPATIENT
Start: 2023-09-08 | End: 2023-09-08

## 2023-09-08 RX ORDER — ALBUTEROL SULFATE 90 UG/1
2 AEROSOL, METERED RESPIRATORY (INHALATION)
Status: DISCONTINUED | OUTPATIENT
Start: 2023-09-08 | End: 2023-09-09 | Stop reason: HOSPADM

## 2023-09-08 RX ORDER — POLYETHYLENE GLYCOL 3350 17 G/17G
1 POWDER, FOR SOLUTION ORAL
Status: DISCONTINUED | OUTPATIENT
Start: 2023-09-08 | End: 2023-09-09 | Stop reason: HOSPADM

## 2023-09-08 RX ORDER — EZETIMIBE 10 MG/1
10 TABLET ORAL DAILY
Status: DISCONTINUED | OUTPATIENT
Start: 2023-09-09 | End: 2023-09-09 | Stop reason: HOSPADM

## 2023-09-08 RX ORDER — SODIUM CHLORIDE 9 MG/ML
INJECTION, SOLUTION INTRAVENOUS CONTINUOUS
Status: DISCONTINUED | OUTPATIENT
Start: 2023-09-08 | End: 2023-09-09 | Stop reason: HOSPADM

## 2023-09-08 RX ORDER — LORAZEPAM 2 MG/ML
0.5 INJECTION INTRAMUSCULAR ONCE
Status: DISCONTINUED | OUTPATIENT
Start: 2023-09-08 | End: 2023-09-09 | Stop reason: HOSPADM

## 2023-09-08 RX ORDER — ASPIRIN 81 MG/1
81 TABLET ORAL DAILY
Status: DISCONTINUED | OUTPATIENT
Start: 2023-09-09 | End: 2023-09-09 | Stop reason: HOSPADM

## 2023-09-08 RX ORDER — BISACODYL 10 MG
10 SUPPOSITORY, RECTAL RECTAL
Status: DISCONTINUED | OUTPATIENT
Start: 2023-09-08 | End: 2023-09-09 | Stop reason: HOSPADM

## 2023-09-08 RX ORDER — LABETALOL HYDROCHLORIDE 5 MG/ML
10 INJECTION, SOLUTION INTRAVENOUS EVERY 4 HOURS PRN
Status: DISCONTINUED | OUTPATIENT
Start: 2023-09-08 | End: 2023-09-09 | Stop reason: HOSPADM

## 2023-09-08 RX ADMIN — SODIUM CHLORIDE: 9 INJECTION, SOLUTION INTRAVENOUS at 20:47

## 2023-09-08 RX ADMIN — ONDANSETRON 4 MG: 2 INJECTION INTRAMUSCULAR; INTRAVENOUS at 20:59

## 2023-09-08 RX ADMIN — SODIUM CHLORIDE 1000 ML: 9 INJECTION, SOLUTION INTRAVENOUS at 18:21

## 2023-09-08 RX ADMIN — METRONIDAZOLE 500 MG: 5 INJECTION, SOLUTION INTRAVENOUS at 21:30

## 2023-09-08 RX ADMIN — ONDANSETRON 4 MG: 2 INJECTION INTRAMUSCULAR; INTRAVENOUS at 18:21

## 2023-09-08 ASSESSMENT — ENCOUNTER SYMPTOMS
POLYDIPSIA: 0
PND: 0
EYE REDNESS: 0
DIARRHEA: 1
FOCAL WEAKNESS: 0
FLANK PAIN: 0
BLOOD IN STOOL: 0
EYE DISCHARGE: 0
STRIDOR: 0
PSYCHIATRIC NEGATIVE: 1
ORTHOPNEA: 0
COUGH: 0
TINGLING: 0
INSOMNIA: 0
WHEEZING: 0
CHILLS: 0
SINUS PAIN: 0
RESPIRATORY NEGATIVE: 1
EYES NEGATIVE: 1
DIZZINESS: 0
DIAPHORESIS: 0
SEIZURES: 0
MUSCULOSKELETAL NEGATIVE: 1
DEPRESSION: 0
SENSORY CHANGE: 0
BACK PAIN: 0
NEUROLOGICAL NEGATIVE: 1
MYALGIAS: 0
NAUSEA: 1
BRUISES/BLEEDS EASILY: 0
FEVER: 0
FALLS: 0
HEARTBURN: 0
SHORTNESS OF BREATH: 0
CARDIOVASCULAR NEGATIVE: 1
DOUBLE VISION: 0
VOMITING: 1
ABDOMINAL PAIN: 0
PHOTOPHOBIA: 0
SPUTUM PRODUCTION: 0

## 2023-09-08 ASSESSMENT — COGNITIVE AND FUNCTIONAL STATUS - GENERAL
DAILY ACTIVITIY SCORE: 24
SUGGESTED CMS G CODE MODIFIER DAILY ACTIVITY: CH
MOBILITY SCORE: 24
SUGGESTED CMS G CODE MODIFIER MOBILITY: CH

## 2023-09-08 ASSESSMENT — LIFESTYLE VARIABLES
TOTAL SCORE: 0
EVER HAD A DRINK FIRST THING IN THE MORNING TO STEADY YOUR NERVES TO GET RID OF A HANGOVER: NO
HAVE YOU EVER FELT YOU SHOULD CUT DOWN ON YOUR DRINKING: NO
TOTAL SCORE: 0
EVER HAD A DRINK FIRST THING IN THE MORNING TO STEADY YOUR NERVES TO GET RID OF A HANGOVER: NO
CONSUMPTION TOTAL: NEGATIVE
HAVE PEOPLE ANNOYED YOU BY CRITICIZING YOUR DRINKING: NO
AVERAGE NUMBER OF DAYS PER WEEK YOU HAVE A DRINK CONTAINING ALCOHOL: 2
ON A TYPICAL DAY WHEN YOU DRINK ALCOHOL HOW MANY DRINKS DO YOU HAVE: 2
HAVE PEOPLE ANNOYED YOU BY CRITICIZING YOUR DRINKING: NO
SUBSTANCE_ABUSE: 0
HAVE YOU EVER FELT YOU SHOULD CUT DOWN ON YOUR DRINKING: NO
TOTAL SCORE: 0
DO YOU DRINK ALCOHOL: NO
ALCOHOL_USE: YES
EVER FELT BAD OR GUILTY ABOUT YOUR DRINKING: NO
HOW MANY TIMES IN THE PAST YEAR HAVE YOU HAD 5 OR MORE DRINKS IN A DAY: 0
TOTAL SCORE: 0
CONSUMPTION TOTAL: INCOMPLETE
EVER FELT BAD OR GUILTY ABOUT YOUR DRINKING: NO

## 2023-09-08 ASSESSMENT — VISUAL ACUITY: OU: 1

## 2023-09-08 ASSESSMENT — FIBROSIS 4 INDEX
FIB4 SCORE: 1.63
FIB4 SCORE: 2.69
FIB4 SCORE: 2.69

## 2023-09-08 ASSESSMENT — PATIENT HEALTH QUESTIONNAIRE - PHQ9
1. LITTLE INTEREST OR PLEASURE IN DOING THINGS: NOT AT ALL
SUM OF ALL RESPONSES TO PHQ9 QUESTIONS 1 AND 2: 0
2. FEELING DOWN, DEPRESSED, IRRITABLE, OR HOPELESS: NOT AT ALL

## 2023-09-08 ASSESSMENT — PAIN DESCRIPTION - PAIN TYPE
TYPE: ACUTE PAIN
TYPE: ACUTE PAIN

## 2023-09-08 ASSESSMENT — COPD QUESTIONNAIRES
DURING THE PAST 4 WEEKS HOW MUCH DID YOU FEEL SHORT OF BREATH: NONE/LITTLE OF THE TIME
HAVE YOU SMOKED AT LEAST 100 CIGARETTES IN YOUR ENTIRE LIFE: YES
COPD SCREENING SCORE: 5
DO YOU EVER COUGH UP ANY MUCUS OR PHLEGM?: YES, A FEW DAYS A WEEK OR MONTH

## 2023-09-08 NOTE — ED TRIAGE NOTES
"Patient presents to the ER with the following complaints:    Chief Complaint   Patient presents with    N/V     Patient has had nausea and vommiting for 2 weeks now. She was diagnosed with covid about two weeks ago and has had a negative test two days ago. Patient has uncontrolled Nausea and vommiting at this time. Patient received 4 mg of zofran and 500 mLs of NS in route via [facility]. Patient still with emesis persisting on arrival.        BP (!) 174/93   Pulse (!) 59   Temp 37.5 °C (99.5 °F) (Temporal)   Resp 20   Ht 1.6 m (5' 3\")   Wt 81.6 kg (180 lb)   SpO2 100%   BMI 31.89 kg/m²       "

## 2023-09-09 VITALS
OXYGEN SATURATION: 98 % | RESPIRATION RATE: 16 BRPM | DIASTOLIC BLOOD PRESSURE: 59 MMHG | TEMPERATURE: 97.8 F | HEIGHT: 63 IN | BODY MASS INDEX: 32.42 KG/M2 | SYSTOLIC BLOOD PRESSURE: 108 MMHG | WEIGHT: 182.98 LBS | HEART RATE: 64 BPM

## 2023-09-09 LAB
ANION GAP SERPL CALC-SCNC: 12 MMOL/L (ref 7–16)
BUN SERPL-MCNC: 12 MG/DL (ref 8–22)
CALCIUM SERPL-MCNC: 8.6 MG/DL (ref 8.4–10.2)
CHLORIDE SERPL-SCNC: 107 MMOL/L (ref 96–112)
CHOLEST SERPL-MCNC: 111 MG/DL (ref 100–199)
CO2 SERPL-SCNC: 24 MMOL/L (ref 20–33)
CREAT SERPL-MCNC: 0.43 MG/DL (ref 0.5–1.4)
ERYTHROCYTE [DISTWIDTH] IN BLOOD BY AUTOMATED COUNT: 45.4 FL (ref 35.9–50)
EST. AVERAGE GLUCOSE BLD GHB EST-MCNC: 111 MG/DL
GFR SERPLBLD CREATININE-BSD FMLA CKD-EPI: 101 ML/MIN/1.73 M 2
GLUCOSE BLD STRIP.AUTO-MCNC: 99 MG/DL (ref 65–99)
GLUCOSE SERPL-MCNC: 103 MG/DL (ref 65–99)
HBA1C MFR BLD: 5.5 % (ref 4–5.6)
HCT VFR BLD AUTO: 43.9 % (ref 37–47)
HDLC SERPL-MCNC: 58 MG/DL
HEMOCCULT SP1 STL QL: NEGATIVE
HEMOCCULT SP2 STL QL: POSITIVE
HGB BLD-MCNC: 14.1 G/DL (ref 12–16)
LACTOFERRIN STL QL IA: NEGATIVE
LDLC SERPL CALC-MCNC: 46 MG/DL
MCH RBC QN AUTO: 30.1 PG (ref 27–33)
MCHC RBC AUTO-ENTMCNC: 32.1 G/DL (ref 32.2–35.5)
MCV RBC AUTO: 93.8 FL (ref 81.4–97.8)
PLATELET # BLD AUTO: 196 K/UL (ref 164–446)
PMV BLD AUTO: 10.7 FL (ref 9–12.9)
POTASSIUM SERPL-SCNC: 4 MMOL/L (ref 3.6–5.5)
RBC # BLD AUTO: 4.68 M/UL (ref 4.2–5.4)
SODIUM SERPL-SCNC: 143 MMOL/L (ref 135–145)
TRIGL SERPL-MCNC: 36 MG/DL (ref 0–149)
WBC # BLD AUTO: 9.2 K/UL (ref 4.8–10.8)

## 2023-09-09 PROCEDURE — 96367 TX/PROPH/DG ADDL SEQ IV INF: CPT

## 2023-09-09 PROCEDURE — 82962 GLUCOSE BLOOD TEST: CPT

## 2023-09-09 PROCEDURE — A9270 NON-COVERED ITEM OR SERVICE: HCPCS | Performed by: HOSPITALIST

## 2023-09-09 PROCEDURE — 700105 HCHG RX REV CODE 258: Performed by: HOSPITALIST

## 2023-09-09 PROCEDURE — 700111 HCHG RX REV CODE 636 W/ 250 OVERRIDE (IP): Mod: JZ | Performed by: HOSPITALIST

## 2023-09-09 PROCEDURE — 85027 COMPLETE CBC AUTOMATED: CPT

## 2023-09-09 PROCEDURE — 99239 HOSP IP/OBS DSCHRG MGMT >30: CPT | Performed by: INTERNAL MEDICINE

## 2023-09-09 PROCEDURE — 94640 AIRWAY INHALATION TREATMENT: CPT

## 2023-09-09 PROCEDURE — 80048 BASIC METABOLIC PNL TOTAL CA: CPT

## 2023-09-09 PROCEDURE — 700102 HCHG RX REV CODE 250 W/ 637 OVERRIDE(OP): Performed by: HOSPITALIST

## 2023-09-09 PROCEDURE — G0378 HOSPITAL OBSERVATION PER HR: HCPCS

## 2023-09-09 PROCEDURE — 36415 COLL VENOUS BLD VENIPUNCTURE: CPT

## 2023-09-09 PROCEDURE — 80061 LIPID PANEL: CPT

## 2023-09-09 PROCEDURE — 96366 THER/PROPH/DIAG IV INF ADDON: CPT

## 2023-09-09 RX ORDER — ONDANSETRON 4 MG/1
4 TABLET, ORALLY DISINTEGRATING ORAL EVERY 6 HOURS PRN
Qty: 30 TABLET | Refills: 0 | Status: SHIPPED | OUTPATIENT
Start: 2023-09-09 | End: 2024-02-21

## 2023-09-09 RX ADMIN — FLUTICASONE PROPIONATE 110 MCG: 110 AEROSOL, METERED RESPIRATORY (INHALATION) at 00:47

## 2023-09-09 RX ADMIN — SERTRALINE HYDROCHLORIDE 100 MG: 50 TABLET ORAL at 05:17

## 2023-09-09 RX ADMIN — ASPIRIN 81 MG: 81 TABLET, COATED ORAL at 05:17

## 2023-09-09 RX ADMIN — ATENOLOL 50 MG: 25 TABLET ORAL at 05:17

## 2023-09-09 RX ADMIN — FLUTICASONE PROPIONATE 110 MCG: 110 AEROSOL, METERED RESPIRATORY (INHALATION) at 08:29

## 2023-09-09 RX ADMIN — EZETIMIBE 10 MG: 10 TABLET ORAL at 05:17

## 2023-09-09 RX ADMIN — METRONIDAZOLE 500 MG: 5 INJECTION, SOLUTION INTRAVENOUS at 05:59

## 2023-09-09 RX ADMIN — CEFTRIAXONE SODIUM 1000 MG: 1 INJECTION, POWDER, FOR SOLUTION INTRAMUSCULAR; INTRAVENOUS at 05:14

## 2023-09-09 ASSESSMENT — PAIN DESCRIPTION - PAIN TYPE
TYPE: ACUTE PAIN

## 2023-09-09 NOTE — ASSESSMENT & PLAN NOTE
History of asthma currently stable without asthma exacerbation  Continue with Flovent and albuterol as needed

## 2023-09-09 NOTE — PROGRESS NOTES
4 Eyes Skin Assessment Completed by Gisselle RN and MAGNOLIA Lawrence.    Head WDL  Ears WDL  Nose WDL  Mouth WDL  Neck WDL  Breast/Chest WDL  Shoulder Blades WDL  Spine WDL  (R) Arm/Elbow/Hand WDL  (L) Arm/Elbow/Hand WDL  Abdomen WDL  Groin WDL  Scrotum/Coccyx/Buttocks WDL  (R) Leg WDL  (L) Leg WDL  (R) Heel/Foot/Toe WDL  (L) Heel/Foot/Toe WDL          Devices In Places Blood Pressure Cuff, Pulse Ox, and SCD's      Interventions In Place Pillows    Possible Skin Injury No    Pictures Uploaded Into Epic N/A  Wound Consult Placed N/A  RN Wound Prevention Protocol Ordered No

## 2023-09-09 NOTE — PROGRESS NOTES
Pt admitted from ED around 2030. A&Ox4. Assisted to bathroom and had a BM. Unable to collect. Hat now in toilet and pt aware we need to collect specimen. Oriented to room. Skin check complete. Medicated for nausea with zofran which did not relieve symptoms. No other medications available. Notified Dr Melendez, on call MD, who stated pt cannot have zofran due to EKG. Ordered Ativan x1. Pt updated.

## 2023-09-09 NOTE — DISCHARGE SUMMARY
Discharge Summary    CHIEF COMPLAINT ON ADMISSION  Chief Complaint   Patient presents with    N/V     Patient has had nausea and vommiting for 2 weeks now. She was diagnosed with covid about two weeks ago and has had a negative test two days ago. Patient has uncontrolled Nausea and vommiting at this time. Patient received 4 mg of zofran and 500 mLs of NS in route via [facility]. Patient still with emesis persisting on arrival.        Reason for Admission  EMS     Admission Date  9/8/2023    CODE STATUS  Full Code    HPI & HOSPITAL COURSE  This is a 78 y.o. female here with nausea, vomiting and diarrhea.      78 y.o. female who presented 9/8/2023 with nausea vomiting and diarrhea.  The patient states her nausea vomiting and diarrhea started this afternoon after eating a sandwich. She said after eating the sandwich about 2 to 4 hours later she started to have the nausea vomiting and diarrhea. The patient did have COVID-19 infection about 2 weeks ago.  She is now had a negative test for it. Patient was started on supportive care and did receive abx for likely gastroenteritis. Procalcitonin negative and symptoms rapidly improved with supportive care, antibiotics dc. Patient has tolerated breakfast and lunch without further nausea or diarrhea. Patient's vital signs are stable and she is requesting to be discharged home. She is to return to the ER if her condition worsens.      Therefore, she is discharged in good and stable condition to home with close outpatient follow-up.      Discharge Date  9/9/2023    FOLLOW UP ITEMS POST DISCHARGE  Follow up with primary care     DISCHARGE DIAGNOSES  Principal Problem:    Nausea & vomiting (POA: Yes)  Active Problems:    Supraventricular tachycardia, paroxysmal (HCC) (POA: Yes)      Overview: Formatting of this note might be different from the original.      On atenolol, stable 6/09      System administrative change to the chronic problem designation    Major depressive disorder,  recurrent episode (HCC) (POA: Yes)      Overview: Formatting of this note might be different from the original.      Per Darwin Barakat) Jermaine on 9/17/14 - on zoloft      SERTRALINE 50 MG ORAL TAB 90 PRN 9/17/2014 9/17/2015 No Sig - Route: Take       1 tablet by mouth daily for depression      Dis Enrolled from  Depression Care Management program   Pt in Remission         PHQ9  1      Zoloft 50 mg qd      Carine Sears RN BSN   10/23/2014            Enrolled in Depression Care Management program 5-27-14      Carine Sears RN BSN      Depression Care Management.             [phone].    Hypogammaglobulinemia (HCC) (POA: Yes)    GERD (gastroesophageal reflux disease) (POA: Yes)      Overview: Formatting of this note might be different from the original.      On prilosec 8/08    Essential hypertension (POA: Yes)    Bilateral primary osteoarthritis of knee (POA: Yes)    Hyperlipidemia (POA: Yes)      Overview: Formatting of this note might be different from the original.      Pt agrees with plan  And requests A1c in addition to labs.  Ordered.       Shira Da Silva RN-BSN      Complete [facility] Advanced Practice Team      5/22/2018            10 yr ASCVD risk 11% - HIGH - Moderate to High Intensity Statin Indicated       Recommend Simvastatin 40mg.       Chart routed to RN for outreach.             Signed by: Jeanna Amos NP      5/15/2018    Asthma (POA: Yes)    Irritable bowel syndrome (POA: Yes)    Diarrhea (POA: Unknown)    Diabetes mellitus type 2 in obese (HCC) (POA: Unknown)    BMI 31.0-31.9,adult (POA: Unknown)  Resolved Problems:    * No resolved hospital problems. *      FOLLOW UP  No future appointments.  Saeid Alvarado M.D.  [address] [address] [id number] [phone]    Follow up  Follow up with primary care in 1-2 weeks      MEDICATIONS ON DISCHARGE     Medication List        START taking these medications        Instructions   ondansetron 4 MG  "Tbdp  Commonly known as: Zofran ODT   Take 1 Tablet by mouth every 6 hours as needed for Nausea/Vomiting.  Dose: 4 mg            CONTINUE taking these medications        Instructions   albuterol 108 (90 Base) MCG/ACT Aers inhalation aerosol   Inhale 1 to 2 puffs by mouth every 4 to 6 hours as needed for wheezing and cough     aspirin 81 MG EC tablet   Take 81 mg by mouth every day.  Dose: 81 mg     atenolol 50 MG Tabs  Commonly known as: Tenormin   Take 1 Tablet by mouth every day for 360 days.  Dose: 50 mg     ezetimibe 10 MG Tabs  Commonly known as: Zetia   Take 1 Tablet by mouth every day for 360 days.  Dose: 10 mg     fluticasone 110 MCG/ACT Aero  Commonly known as: Flovent HFA   Inhale 1 Puff 2 times a day.  Dose: 1 Puff     ibuprofen 200 MG Tabs  Commonly known as: Motrin   Take 600 mg by mouth every 6 hours as needed. Indications: Pain  Dose: 600 mg     omeprazole 20 MG delayed-release capsule  Commonly known as: PriLOSEC   Take 40 mg by mouth every evening.  Dose: 40 mg     sertraline 100 MG Tabs  Commonly known as: Zoloft   Take 1 Tablet by mouth every day for 360 days.  Dose: 100 mg     simvastatin 40 MG Tabs  Commonly known as: Zocor   Doctor's comments: Please advise Mail order for patient  Take 1 Tablet by mouth every evening.  Dose: 40 mg              Allergies  Allergies   Allergen Reactions    Morphine      Lowers heart rate    Erythromycin Nausea    Hydrocodone-Acetaminophen     Mastisol Adhesive [Mastisol] Rash    Meperidine Hcl Unspecified     [08/08/09 UNKNOWN, PLEASE VERIFY]    Other Drug      \"All narcotics make me nauseated\"       DIET  Orders Placed This Encounter   Procedures    Diet Order Diet: Low Fiber(GI Soft)     Standing Status:   Standing     Number of Occurrences:   1     Order Specific Question:   Diet:     Answer:   Low Fiber(GI Soft) [2]       ACTIVITY  As tolerated.  Weight bearing as tolerated    CONSULTATIONS  N/A    PROCEDURES  N/A    LABORATORY  Lab Results   Component Value " Date    SODIUM 143 09/09/2023    POTASSIUM 4.0 09/09/2023    CHLORIDE 107 09/09/2023    CO2 24 09/09/2023    GLUCOSE 103 (H) 09/09/2023    BUN 12 09/09/2023    CREATININE 0.43 (L) 09/09/2023        Lab Results   Component Value Date    WBC 9.2 09/09/2023    HEMOGLOBIN 14.1 09/09/2023    HEMATOCRIT 43.9 09/09/2023    PLATELETCT 196 09/09/2023        Total time of the discharge process exceeds 34 minutes.

## 2023-09-09 NOTE — ASSESSMENT & PLAN NOTE
Optimize blood pressure management keep systolic blood pressure less than 140 diastolic under 90  Continue atenolol 50 mg p.o. daily and labetalol as needed

## 2023-09-09 NOTE — ASSESSMENT & PLAN NOTE
Patient has developed acute nausea vomiting and diarrhea this afternoon.  She had a salami sandwich for lunch.  She made it herself.  She did have some pepperoni in it as well.  She did use mayonnaise.  The patient at this point has had so much diarrhea she is almost completely empty and that she is now stopped.  Most likely infectious source with gastroenteritis.  Patient will be at this point given fluid resuscitation  Check stool for occult blood, lactoferrin and stool cultures.  Pain management  Antiemetic management

## 2023-09-09 NOTE — CARE PLAN
"The patient is Stable - Low risk of patient condition declining or worsening    Shift Goals  Clinical Goals: Manage nausea/vomiting, tolerate full liquids, sleep > 4 hours  Patient Goals: \"just get more rest, ER bed was not good\"    Patient is not progressing towards the following goals: Nausea comes in waves, no PRN meds available due to EKG, woke up with SOB x1 and had difficulty falling asleep, taking only small sips of liquids at this time      Problem: Knowledge Deficit - Standard  Goal: Patient and family/care givers will demonstrate understanding of plan of care, disease process/condition, diagnostic tests and medications  9/8/2023 2228 by Mary Nye R.N.  Outcome: Progressing  9/8/2023 2209 by Mary Nye R.N.  Outcome: Progressing     Problem: Psychosocial  Goal: Patient's ability to verbalize feelings about condition will improve  9/8/2023 2228 by Mary Nye R.N.  Outcome: Progressing  9/8/2023 2209 by Mary Nye R.N.  Outcome: Progressing     Problem: Communication  Goal: The ability to communicate needs accurately and effectively will improve  9/8/2023 2228 by Mary Nye R.N.  Outcome: Progressing  9/8/2023 2209 by Mary Nye R.N.  Outcome: Progressing     Problem: Gastrointestinal Irritability  Goal: Nausea and vomiting will be absent or improve  9/8/2023 2228 by Mary Nye R.N.  Outcome: Progressing  9/8/2023 2209 by Mary Nye R.N.  Outcome: Progressing     Problem: Self Care  Goal: Patient will have the ability to perform ADLs independently or with assistance (bathe, groom, dress, toilet and feed)  9/8/2023 2228 by Mary Nye R.N.  Outcome: Progressing  9/8/2023 2209 by Mary Nye R.N.  Outcome: Progressing     Problem: Infection - Standard  Goal: Patient will remain free from infection  9/8/2023 2228 by Mary Nye R.N.  Outcome: Progressing  9/8/2023 2209 by Mary Nye R.N.  Outcome: Progressing     "

## 2023-09-09 NOTE — DISCHARGE INSTRUCTIONS
Diet    [place] Diet    A [place] Diet consists of foods that are often soft and do not have a lot of fat, fiber, or extra seasonings.  Examples include, but are not limited to bananas, rice, applesauce and toast.  Foods without fat, fiber, or seasoning are easier for the body to digest. They are also less likely to irritate your mouth, throat, stomach, and other parts of your digestive system. You may gradually resume your normal diet after 24 to 48 hours.

## 2023-09-09 NOTE — PROGRESS NOTES
Received report from night shift RN. Assumed pt care 0700  Pt is A&Ox4, resting comfortably in bed. Plan of care reviewed for activities and goals this shift.  Pt verbalizes understanding of plan of care for this shift. Pt on room air; no signs of SOB/respiratory distress. Labs noted. Pt denies pain and nausea at this shift.  Patients needs attended well.   Pt tolerated some breakfast and lunch meal. Denies nausea or pain. Provider updated.       Fall precautions in place. Bed at lowest position. Call light and personal belongings within reach.   Hourly rounding in progress.      Discharge instructions reviewed. Medication education, follow up and home care education provided. Pt verbalizes understanding of instructions.   Level of comfort increased prior to discharge. VSS. PIV removed. Belongings gathered to take home.

## 2023-09-09 NOTE — ED PROVIDER NOTES
ED Provider Note    CHIEF COMPLAINT  Chief Complaint   Patient presents with    N/V     Patient has had nausea and vommiting for 2 weeks now. She was diagnosed with covid about two weeks ago and has had a negative test two days ago. Patient has uncontrolled Nausea and vommiting at this time. Patient received 4 mg of zofran and 500 mLs of NS in route via [facility]. Patient still with emesis persisting on arrival.        EXTERNAL RECORDS REVIEWED  Outpatient Notes recent outpatient test for COVID    HPI/ROS  LIMITATION TO HISTORY   Select: : None  OUTSIDE HISTORIAN(S):  Significant other  at bedside    Jackie GEE is a 78 y.o. female who presents to the emergency department for nausea, vomiting, diarrhea.  Past medical history as document below.  Patient was COVID-positive with URI symptoms as of 2 weeks ago.  As of a few days ago however she tested negative for COVID.  She felt relatively well this morning but as the afternoon progressed she had onset of the above symptoms.  She was provided with Zofran prior to arrival.  Feeling slightly better but continues to be nauseous.  Denies any abdominal pain.  No chest pain or shortness of breath.  No blood noted in her stool or vomitus.    PAST MEDICAL HISTORY   has a past medical history of Allergy, Anxiety, Arthritis, Asthma, Chest pressure (7/11/2022), Depression, Dysrhythmia, GERD (gastroesophageal reflux disease), Hair loss (10/27/2021), History of nonmelanoma skin cancer (03/21/2018), Hypertension, and Vaginal Papanicolaou smear not required due to history of hysterectomy (02/28/2013).    SURGICAL HISTORY   has a past surgical history that includes other orthopedic surgery (Bilateral); abdominal hysterectomy total; arthroplasty; eye surgery; and tubal coagulation laparoscopic bilateral.    FAMILY HISTORY  Family History   Problem Relation Age of Onset    Cancer Father         Prostate    Diabetes Father     Heart Disease Father     Colorectal Cancer Maternal  "Grandfather     Glaucoma Maternal Grandfather     Heart Disease Maternal Grandmother        SOCIAL HISTORY  Social History     Tobacco Use    Smoking status: Former     Current packs/day: 1.00     Average packs/day: 1 pack/day for 35.0 years (35.0 ttl pk-yrs)     Types: Cigarettes    Smokeless tobacco: Never   Vaping Use    Vaping Use: Never used   Substance and Sexual Activity    Alcohol use: Yes     Alcohol/week: 6.0 oz     Types: 10 Glasses of wine per week     Comment: occ    Drug use: No    Sexual activity: Yes     Partners: Male     Birth control/protection: Female Sterilization, Post-Menopausal       CURRENT MEDICATIONS  Home Medications       Reviewed by Claudette Casiano M.D. (Physician) on 09/08/23 at 1906  Med List Status: Complete     Medication Last Dose Status   albuterol 108 (90 Base) MCG/ACT Aero Soln inhalation aerosol PRN Active   aspirin 81 MG EC tablet 9/8/2023 Active   atenolol (TENORMIN) 50 MG Tab 9/8/2023 Active   ezetimibe (ZETIA) 10 MG Tab 9/8/2023 Active   fluticasone (FLOVENT HFA) 110 MCG/ACT Aerosol 9/7/2023 Active   ibuprofen (MOTRIN) 200 MG Tab 9/8/2023 Active   omeprazole (PRILOSEC) 20 MG delayed-release capsule 9/7/2023 Active   sertraline (ZOLOFT) 100 MG Tab 9/8/2023 Active   simvastatin (ZOCOR) 40 MG Tab 9/7/2023 Active                    ALLERGIES  Allergies   Allergen Reactions    Morphine      Lowers heart rate    Erythromycin Nausea    Hydrocodone-Acetaminophen     Mastisol Adhesive [Mastisol] Rash    Meperidine Hcl Unspecified     [08/08/09 UNKNOWN, PLEASE VERIFY]    Other Drug      \"All narcotics make me nauseated\"       PHYSICAL EXAM  VITAL SIGNS: /58   Pulse 75   Temp 36.1 °C (97 °F) (Temporal)   Resp 16   Ht 1.6 m (5' 3\")   Wt 83 kg (182 lb 15.7 oz)   SpO2 93%   BMI 32.41 kg/m²      Pulse ox interpretation: I interpret this pulse ox as normal.  Constitutional: Alert in no apparent distress.  Holding emesis bag.  HENT: No signs of trauma, Bilateral external ears " normal, Nose normal.   Eyes: Pupils are equal and reactive  Neck: Normal range of motion, No tenderness, Supple  Cardiovascular: Regular rate and rhythm, no murmurs.   Thorax & Lungs: Normal breath sounds, No respiratory distress, No wheezing, No chest tenderness.   Abdomen: Bowel sounds normal, Soft, No tenderness  Skin: Warm, Dry, No erythema, No rash.  Slightly pale with nausea  Extremities: Intact distal pulses  Musculoskeletal: Good range of motion in all major joints. No tenderness to palpation or major deformities noted.   Neurologic: Alert , Normal motor function, Normal sensory function, No focal deficits noted.   Psychiatric: Affect normal, Judgment normal, Mood normal.         DIAGNOSTIC STUDIES / PROCEDURES      LABS  Results for orders placed or performed during the hospital encounter of 09/08/23   CBC WITH DIFFERENTIAL   Result Value Ref Range    WBC 9.6 4.8 - 10.8 K/uL    RBC 4.84 4.20 - 5.40 M/uL    Hemoglobin 14.9 12.0 - 16.0 g/dL    Hematocrit 45.5 37.0 - 47.0 %    MCV 94.0 81.4 - 97.8 fL    MCH 30.8 27.0 - 33.0 pg    MCHC 32.7 32.2 - 35.5 g/dL    RDW 45.1 35.9 - 50.0 fL    Platelet Count 185 164 - 446 K/uL    MPV 11.3 9.0 - 12.9 fL    Neutrophils-Polys 75.00 (H) 44.00 - 72.00 %    Lymphocytes 19.00 (L) 22.00 - 41.00 %    Monocytes 3.00 0.00 - 13.40 %    Eosinophils 2.00 0.00 - 6.90 %    Basophils 1.00 0.00 - 1.80 %    Nucleated RBC 0.00 0.00 - 0.20 /100 WBC    Neutrophils (Absolute) 7.20 1.82 - 7.42 K/uL    Lymphs (Absolute) 1.82 1.00 - 4.80 K/uL    Monos (Absolute) 0.29 0.00 - 0.85 K/uL    Eos (Absolute) 0.19 0.00 - 0.51 K/uL    Baso (Absolute) 0.10 0.00 - 0.12 K/uL    NRBC (Absolute) 0.00 K/uL   COMP METABOLIC PANEL   Result Value Ref Range    Sodium 143 135 - 145 mmol/L    Potassium 3.8 3.6 - 5.5 mmol/L    Chloride 103 96 - 112 mmol/L    Co2 24 20 - 33 mmol/L    Anion Gap 16.0 7.0 - 16.0    Glucose 140 (H) 65 - 99 mg/dL    Bun 15 8 - 22 mg/dL    Creatinine 0.52 0.50 - 1.40 mg/dL    Calcium 9.4  8.4 - 10.2 mg/dL    Correct Calcium 9.2 8.5 - 10.5 mg/dL    AST(SGOT) 30 12 - 45 U/L    ALT(SGPT) 21 2 - 50 U/L    Alkaline Phosphatase 80 30 - 99 U/L    Total Bilirubin 0.5 0.1 - 1.5 mg/dL    Albumin 4.2 3.2 - 4.9 g/dL    Total Protein 7.0 6.0 - 8.2 g/dL    Globulin 2.8 1.9 - 3.5 g/dL    A-G Ratio 1.5 g/dL   LIPASE   Result Value Ref Range    Lipase 24 11 - 82 U/L   TROPONIN   Result Value Ref Range    Troponin T 7 6 - 19 ng/L   ESTIMATED GFR   Result Value Ref Range    GFR (CKD-EPI) 96 >60 mL/min/1.73 m 2   DIFFERENTIAL MANUAL   Result Value Ref Range    Manual Diff Status PERFORMED    PLATELET ESTIMATE   Result Value Ref Range    Plt Estimation Normal    MORPHOLOGY   Result Value Ref Range    RBC Morphology Normal    PROCALCITONIN   Result Value Ref Range    Procalcitonin 0.03 <0.25 ng/mL   LACTIC ACID   Result Value Ref Range    Lactic Acid 1.6 0.5 - 2.0 mmol/L   CRP QUANTITIVE (NON-CARDIAC)   Result Value Ref Range    Stat C-Reactive Protein 0.63 0.00 - 0.75 mg/dL   EKG (NOW)   Result Value Ref Range    Report       [facility]    Test Date:  2023  Pt Name:    SANDRA GEE                Department: [facility]  MRN:        [id number]                      [facility]  Gender:     Female                       Technician: tashia  :        [id number]                   Requested By:ADIEL CERVANTES  Order #:    [id number]                    Reading MD: Adiel Cervantes    Measurements  Intervals                                Axis  Rate:       57                           P:          47  WA:         206                          QRS:        -6  QRSD:       119                          T:          6  QT:         536  QTc:        522    Interpretive Statements  Sinus rhythm  Nonspecific intraventricular conduction delay  Borderline ST depression, lateral leads  Compared to ECG 2022 12:16:33  Intraventricular conduction delay now present  ST (T wave) deviation now  present  Electronically Signed On 09- 19:14:04 PDT by Adiel Loo     POCT glucose device results   Result Value Ref Range    POC Glucose, Blood 112 (H) 65 - 99 mg/dL         RADIOLOGY  I have independently interpreted the diagnostic imaging associated with this visit and am waiting the final reading from the radiologist.   My preliminary interpretation is as follows: No consolidative process  Radiologist interpretation:   DX-CHEST-PORTABLE (1 VIEW)   Final Result      No definite acute cardiopulmonary abnormality.            COURSE & MEDICAL DECISION MAKING    ED Observation Status? No; Patient does not meet criteria for ED Observation.     INITIAL ASSESSMENT, COURSE AND PLAN  Care Narrative: 78-year-old female presenting to the emerged part with the above presentation.  Cute onset of nausea vomiting diarrhea this afternoon.    Patient with persistent intractable vomiting.  Will admit for IV hydration and serial labs and testing.  There is a possibility of colitis as was discussed with the hospitalist.  However we also have through shared decision making agreed on foregoing any current advanced imaging given her current benign abdominal presentation    DISPOSITION AND DISCUSSIONS  I have discussed management of the patient with the following physicians and MADY's: Hospitalist    Discussion of management with other Q or appropriate source(s): Pharmacy for medication verification      78-year-old female presented emerged part with the above presentation.  Work-up as above.  Again patient with persistent vomiting.  And I feel to tolerate p.o. intake.  Will be brought into the hospital service for ongoing inpatient care at this time.    FINAL DIAGNOSIS  Intractable vomiting       Electronically signed by: Adiel Loo M.D., 9/8/2023 5:14 PM

## 2023-09-09 NOTE — ASSESSMENT & PLAN NOTE
Body mass index is 31.89 kg/m².  Outpatient weight loss management program and lifestyle modification highly recommended  On discharge referral to bariatric clinic recommended

## 2023-09-09 NOTE — ASSESSMENT & PLAN NOTE
History of irritable bowel syndrome currently she says she was mostly constipated with it and did not have irritable bowels for some time now

## 2023-09-09 NOTE — ASSESSMENT & PLAN NOTE
Patient says she does not believe that she is diabetic  Check a hemoglobin A1c, from 2 years ago she was 5.4.  Blood sugar today is 140.  And this is a fasting blood sugar with her vomiting.  -accus with sliding scale coverage

## 2023-09-09 NOTE — ASSESSMENT & PLAN NOTE
Diarrhea most likely secondary to the gastroenteritis  Continue at this point with fluid resuscitation monitor electrolytes

## 2023-09-09 NOTE — ED NOTES
"Pt rounding, ekg in progress. Pt requesting anti nausea \"meds from ambulance\". Will update erp to same  "

## 2023-09-09 NOTE — H&P
Hospital Medicine History & Physical Note    Date of Service  9/8/2023    Primary Care Physician  Saeid Alvarado M.D.    Consultants  None    Specialist Names: None    Code Status  Full Code    Chief Complaint  Chief Complaint   Patient presents with    N/V     Patient has had nausea and vommiting for 2 weeks now. She was diagnosed with covid about two weeks ago and has had a negative test two days ago. Patient has uncontrolled Nausea and vommiting at this time. Patient received 4 mg of zofran and 500 mLs of NS in route via [facility]. Patient still with emesis persisting on arrival.        History of Presenting Illness  Jackie GEE is a 78 y.o. female who presented 9/8/2023 with nausea vomiting and diarrhea.  The patient states her nausea vomiting and diarrhea started this afternoon.  She had a salami sandwich that she made herself and also had pepperoni in it.  She did use mayonnaise in it.  Potential for gastroenteritis is present.  She said after eating the sandwich about 2 to 4 hours later she started to have the nausea vomiting and diarrhea.  She still does not have any abdominal pain with it.  The patient did have COVID-19 infection about 2 weeks ago.  She is now had a negative test for it.  The patient at this point will need fluid resuscitation antiemetic management and I will initiate her on Rocephin Flagyl for antibiotic treatment for presumed gastroenteritis.  We will obtain at this point stool studies and these will need to be followed up prior to discharge.    I discussed the plan of care with patient, bedside RN, and emergency room physician Dr. Loo .    Review of Systems  Review of Systems   Constitutional:  Positive for malaise/fatigue. Negative for chills, diaphoresis and fever.   HENT: Negative.  Negative for nosebleeds and sinus pain.    Eyes: Negative.  Negative for double vision, photophobia, discharge and redness.   Respiratory: Negative.  Negative for cough, sputum production,  shortness of breath, wheezing and stridor.    Cardiovascular: Negative.  Negative for chest pain, orthopnea, leg swelling and PND.   Gastrointestinal:  Positive for diarrhea, nausea and vomiting. Negative for abdominal pain, blood in stool and heartburn.   Genitourinary: Negative.  Negative for dysuria, flank pain and frequency.   Musculoskeletal: Negative.  Negative for back pain, falls and myalgias.   Skin: Negative.  Negative for itching.   Neurological: Negative.  Negative for dizziness, tingling, sensory change, focal weakness and seizures.   Endo/Heme/Allergies: Negative.  Negative for polydipsia. Does not bruise/bleed easily.   Psychiatric/Behavioral: Negative.  Negative for depression, substance abuse and suicidal ideas. The patient does not have insomnia.    All other systems reviewed and are negative.      Past Medical History   has a past medical history of Allergy, Anxiety, Arthritis, Asthma, Chest pressure (7/11/2022), Depression, Dysrhythmia, GERD (gastroesophageal reflux disease), Hair loss (10/27/2021), History of nonmelanoma skin cancer (03/21/2018), Hypertension, and Vaginal Papanicolaou smear not required due to history of hysterectomy (02/28/2013).    Surgical History   has a past surgical history that includes other orthopedic surgery (Bilateral); abdominal hysterectomy total; arthroplasty; eye surgery; and tubal coagulation laparoscopic bilateral.     Family History  family history includes Cancer in her father; Colorectal Cancer in her maternal grandfather; Diabetes in her father; Glaucoma in her maternal grandfather; Heart Disease in her father and maternal grandmother.   Family history reviewed with patient. There is no family history that is pertinent to the chief complaint.     Social History   reports that she has quit smoking. Her smoking use included cigarettes. She has a 35.0 pack-year smoking history. She has never used smokeless tobacco. She reports current alcohol use of about 6.0  "oz of alcohol per week. She reports that she does not use drugs.    Allergies  Allergies   Allergen Reactions    Morphine      Lowers heart rate    Erythromycin Nausea    Hydrocodone-Acetaminophen     Mastisol Adhesive [Mastisol] Rash    Meperidine Hcl Unspecified     [08/08/09 UNKNOWN, PLEASE VERIFY]    Other Drug      \"All narcotics make me nauseated\"       Medications  Prior to Admission Medications   Prescriptions Last Dose Informant Patient Reported? Taking?   albuterol 108 (90 Base) MCG/ACT Aero Soln inhalation aerosol PRN at PRN Patient No No   Sig: Inhale 1 to 2 puffs by mouth every 4 to 6 hours as needed for wheezing and cough   aspirin 81 MG EC tablet 9/8/2023 at AM Patient Yes No   Sig: Take 81 mg by mouth every day.   atenolol (TENORMIN) 50 MG Tab 9/8/2023 at AM Patient No No   Sig: Take 1 Tablet by mouth every day for 360 days.   ezetimibe (ZETIA) 10 MG Tab 9/8/2023 at AM Patient No No   Sig: Take 1 Tablet by mouth every day for 360 days.   fluticasone (FLOVENT HFA) 110 MCG/ACT Aerosol 9/7/2023 at PM Patient No No   Sig: Inhale 1 Puff 2 times a day.   ibuprofen (MOTRIN) 200 MG Tab 9/8/2023 at AM Patient Yes Yes   Sig: Take 600 mg by mouth every 6 hours as needed. Indications: Pain   omeprazole (PRILOSEC) 20 MG delayed-release capsule 9/7/2023 at PM Patient Yes Yes   Sig: Take 40 mg by mouth every evening.   sertraline (ZOLOFT) 100 MG Tab 9/8/2023 at AM Patient No No   Sig: Take 1 Tablet by mouth every day for 360 days.   simvastatin (ZOCOR) 40 MG Tab 9/7/2023 at PM Patient No No   Sig: Take 1 Tablet by mouth every evening.      Facility-Administered Medications: None       Physical Exam  Temp:  [37.5 °C (99.5 °F)] 37.5 °C (99.5 °F)  Pulse:  [59-61] 61  Resp:  [14-20] 15  BP: (174)/(93) 174/93  SpO2:  [100 %] 100 %  Blood Pressure : (!) 174/93   Temperature: 37.5 °C (99.5 °F)   Pulse: 61   Respiration: 15   Pulse Oximetry: 100 %       Physical Exam  Vitals and nursing note reviewed. Exam conducted with a " chaperone present.   Constitutional:       General: She is awake.      Appearance: Normal appearance. She is well-developed and well-groomed. She is obese. She is ill-appearing.   HENT:      Head: Normocephalic and atraumatic.      Jaw: There is normal jaw occlusion. No trismus.      Salivary Glands: Right salivary gland is not tender. Left salivary gland is not tender.      Right Ear: External ear normal.      Left Ear: External ear normal.      Mouth/Throat:      Mouth: Mucous membranes are moist.      Pharynx: Oropharynx is clear.   Eyes:      General: Lids are normal. Vision grossly intact.      Extraocular Movements: Extraocular movements intact.      Conjunctiva/sclera: Conjunctivae normal.      Right eye: Right conjunctiva is not injected. No exudate.     Left eye: Left conjunctiva is not injected. No exudate.     Pupils: Pupils are equal, round, and reactive to light.   Neck:      Thyroid: No thyroid mass.      Vascular: No hepatojugular reflux or JVD.      Trachea: No abnormal tracheal secretions or tracheal deviation.   Cardiovascular:      Rate and Rhythm: Normal rate and regular rhythm. Occasional Extrasystoles are present.     Pulses: Normal pulses.      Heart sounds: Normal heart sounds. No murmur heard.     No friction rub.   Pulmonary:      Effort: Pulmonary effort is normal.      Breath sounds: Examination of the right-lower field reveals decreased breath sounds. Examination of the left-lower field reveals decreased breath sounds. Decreased breath sounds present. No wheezing or rhonchi.   Abdominal:      General: Abdomen is flat. There is distension.      Palpations: Abdomen is soft.      Tenderness: There is abdominal tenderness in the epigastric area. There is no right CVA tenderness or left CVA tenderness.      Hernia: No hernia is present.   Musculoskeletal:      Cervical back: Full passive range of motion without pain, normal range of motion and neck supple. No rigidity. No muscular tenderness.       Right lower leg: No edema.      Left lower leg: No edema.   Lymphadenopathy:      Head:      Right side of head: No submental adenopathy.      Left side of head: No submental adenopathy.      Cervical:      Right cervical: No superficial cervical adenopathy.     Left cervical: No superficial cervical adenopathy.      Upper Body:      Right upper body: No supraclavicular adenopathy.      Left upper body: No supraclavicular adenopathy.   Skin:     General: Skin is warm and dry.      Capillary Refill: Capillary refill takes 2 to 3 seconds.      Coloration: Skin is not cyanotic or pale.      Findings: No abrasion or bruising.   Neurological:      General: No focal deficit present.      Mental Status: She is alert and oriented to person, place, and time. Mental status is at baseline.      GCS: GCS eye subscore is 4. GCS verbal subscore is 5. GCS motor subscore is 6.      Cranial Nerves: No cranial nerve deficit.      Sensory: No sensory deficit.      Motor: Motor function is intact.      Deep Tendon Reflexes:      Reflex Scores:       Tricep reflexes are 2+ on the right side and 2+ on the left side.       Bicep reflexes are 2+ on the right side and 2+ on the left side.       Brachioradialis reflexes are 2+ on the right side and 2+ on the left side.       Patellar reflexes are 2+ on the right side and 2+ on the left side.       Achilles reflexes are 2+ on the right side and 2+ on the left side.  Psychiatric:         Attention and Perception: Attention and perception normal.         Mood and Affect: Mood normal.         Speech: Speech normal.         Behavior: Behavior is cooperative.         Thought Content: Thought content normal.         Cognition and Memory: Cognition and memory normal.         Judgment: Judgment normal.         Laboratory:  Recent Labs     09/08/23  1635   WBC 9.6   RBC 4.84   HEMOGLOBIN 14.9   HEMATOCRIT 45.5   MCV 94.0   MCH 30.8   MCHC 32.7   RDW 45.1   PLATELETCT 185   MPV 11.3     Recent  "Labs     09/08/23  1635   SODIUM 143   POTASSIUM 3.8   CHLORIDE 103   CO2 24   GLUCOSE 140*   BUN 15   CREATININE 0.52   CALCIUM 9.4     Recent Labs     09/08/23  1635   ALTSGPT 21   ASTSGOT 30   ALKPHOSPHAT 80   TBILIRUBIN 0.5   LIPASE 24   GLUCOSE 140*         No results for input(s): \"NTPROBNP\" in the last 72 hours.      Recent Labs     09/08/23  1635   TROPONINT 7       Imaging:  DX-CHEST-PORTABLE (1 VIEW)   Final Result      No definite acute cardiopulmonary abnormality.          X-Ray:  I have personally reviewed the images and compared with prior images.  EKG:  I have personally reviewed the images and compared with prior images.    Assessment/Plan:  Justification for Admission Status  I anticipate this patient is appropriate for observation status at this time because patient reported to the emergency room with nausea vomiting and diarrhea.  She is painless otherwise and at this point she will need fluid resuscitation and antiemetic management and evaluation for gastroenteritis the patient will be 23 hours or less of hospital management    Patient will need a Med/Surg bed on MEDICAL service .  The need is secondary to nausea vomiting and diarrhea.    * Nausea & vomiting- (present on admission)  Assessment & Plan  Patient has developed acute nausea vomiting and diarrhea this afternoon.  She had a salami sandwich for lunch.  She made it herself.  She did have some pepperoni in it as well.  She did use mayonnaise.  The patient at this point has had so much diarrhea she is almost completely empty and that she is now stopped.  Most likely infectious source with gastroenteritis.  Patient will be at this point given fluid resuscitation  Check stool for occult blood, lactoferrin and stool cultures.  Pain management  Antiemetic management    Diarrhea  Assessment & Plan  Diarrhea most likely secondary to the gastroenteritis  Continue at this point with fluid resuscitation monitor electrolytes    Diabetes mellitus type " 2 in obese (HCC)  Assessment & Plan  Patient says she does not believe that she is diabetic  Check a hemoglobin A1c, from 2 years ago she was 5.4.  Blood sugar today is 140.  And this is a fasting blood sugar with her vomiting.  -accus with sliding scale coverage      Essential hypertension- (present on admission)  Assessment & Plan  Optimize blood pressure management keep systolic blood pressure less than 140 diastolic under 90  Continue atenolol 50 mg p.o. daily and labetalol as needed    Supraventricular tachycardia, paroxysmal (HCC)- (present on admission)  Assessment & Plan  History of SVT currently stable  Mostly on atenolol and atenolol seems to be controlling the SVT.    BMI 31.0-31.9,adult  Assessment & Plan  Body mass index is 31.89 kg/m².  Outpatient weight loss management program and lifestyle modification highly recommended  On discharge referral to bariatric clinic recommended      Irritable bowel syndrome- (present on admission)  Assessment & Plan  History of irritable bowel syndrome currently she says she was mostly constipated with it and did not have irritable bowels for some time now    Asthma- (present on admission)  Assessment & Plan  History of asthma currently stable without asthma exacerbation  Continue with Flovent and albuterol as needed    Hyperlipidemia- (present on admission)  Assessment & Plan  Low-fat low-cholesterol diet  Continue with Zetia  Fasting lipid panel    Bilateral primary osteoarthritis of knee- (present on admission)  Assessment & Plan  Pain management    GERD (gastroesophageal reflux disease)- (present on admission)  Assessment & Plan  PPI therapy    Hypogammaglobulinemia (HCC)- (present on admission)  Assessment & Plan  History of hypogammaglobulinemia currently not on any supplementation  Continue to monitor counts    Major depressive disorder, recurrent episode (HCC)- (present on admission)  Assessment & Plan  Continue with Zoloft which seems to be stabilizing her  condition  Not suicidal or homicidal        VTE prophylaxis: SCDs/TEDs

## 2023-09-11 ENCOUNTER — PATIENT OUTREACH (OUTPATIENT)
Dept: MEDICAL GROUP | Facility: LAB | Age: 78
End: 2023-09-11
Payer: MEDICARE

## 2023-09-12 ENCOUNTER — TELEPHONE (OUTPATIENT)
Dept: MEDICAL GROUP | Facility: LAB | Age: 78
End: 2023-09-12
Payer: MEDICARE

## 2023-09-12 NOTE — PROGRESS NOTES
Transitional Care Management  TCM Outreach Date and Time: Filed (9/11/2023  5:27 PM)    Discharge Questions  Actual Discharge Date: 09/09/23  Now that you are home, how are you feeling?: Fair (No nausea or vomiting; no fever; able to eat bland light meals)  Did you receive any new prescriptions?: Yes  Were you able to get them filled?: Yes  Meds to Bed or Pharmacy filled?: Pharmacy  Do you have any questions about your current medications or new medications (Review Med Rec)?: No  Do you have a follow up appointment scheduled with your PCP?: Yes  Appointment Date: 09/15/23  Appointment Time: 1120  Any issues or paperwork you wish to discuss with your PCP?: Yes (Please specify) (Has issues sleeping; under a great deal of stres as  has alzheimer's)  Does this patient qualify for the CCM program?: Yes    Transitional Care  Number of attempts made to contact patient: 1  Current or previous attempts competed within two business days of discharge? : Yes  Provided education regarding treatment plan, medications, self-management, ADLs?: No  Has patient completed an Advanced Directive?: No  Has the Care Manager's phone number provided?: No  Is there anything else I can help you with?: No    Discharge Summary  Chief Complaint: Nausea/Vomiting  Admitting Diagnosis: Gastroenteritis  Discharge Diagnosis: Gastroenteritis

## 2023-09-12 NOTE — TELEPHONE ENCOUNTER
ESTABLISHED PATIENT PRE-VISIT PLANNING     Patient was NOT contacted to complete PVP.     Note: Patient will not be contacted if there is no indication to call.     1.  Reviewed notes from the last few office visits within the medical group: Yes    2.  If any orders were placed at last visit or intended to be done for this visit (i.e. 6 mos follow-up), do we have Results/Consult Notes?           Labs - Labs ordered, completed on 7/20/23 and results are in chart.  Note: If patient appointment is for lab review and patient did not complete labs, check with provider if OK to reschedule patient until labs completed.         Imaging - Imaging ordered, completed and results are in chart.         Referrals - No referrals were ordered at last office visit.    3. Is this appointment scheduled as a Hospital Follow-Up? Yes, visit was at [facility].     4.  Immunizations were updated in Epic using Reconcile Outside Information activity? No, failed     5.  Patient is due for the following Health Maintenance Topics:   Health Maintenance Due   Topic Date Due    Diabetes: Urine Protein Screening  Never done    Diabetes: Monofilament / LE Exam  Never done    Diabetes: Retinopathy Screening  Never done    COVID-19 Vaccine (6 - Pfizer series) 02/16/2023    Influenza Vaccine (1) 09/01/2023     6.  AHA (Pulse8) form printed for Provider? N/A

## 2023-09-13 ENCOUNTER — PHARMACY VISIT (OUTPATIENT)
Dept: PHARMACY | Facility: MEDICAL CENTER | Age: 78
End: 2023-09-13
Payer: COMMERCIAL

## 2023-09-13 LAB
BACTERIA BLD CULT: NORMAL
BACTERIA BLD CULT: NORMAL
SIGNIFICANT IND 70042: NORMAL
SIGNIFICANT IND 70042: NORMAL
SITE SITE: NORMAL
SITE SITE: NORMAL
SOURCE SOURCE: NORMAL
SOURCE SOURCE: NORMAL

## 2023-09-13 PROCEDURE — RXMED WILLOW AMBULATORY MEDICATION CHARGE: Performed by: FAMILY MEDICINE

## 2023-09-13 NOTE — TELEPHONE ENCOUNTER
Received request via: Patient    Was the patient seen in the last year in this department? Yes  7/14/23  Does the patient have an active prescription (recently filled or refills available) for medication(s) requested? No    Does the patient have [facility] and need 100 day supply (blood pressure, diabetes and cholesterol meds only)? Medication is not for cholesterol, blood pressure or diabetes

## 2023-09-14 RX ORDER — OMEPRAZOLE 20 MG/1
40 CAPSULE, DELAYED RELEASE ORAL 2 TIMES DAILY
Qty: 60 CAPSULE | Refills: 2 | Status: SHIPPED | OUTPATIENT
Start: 2023-09-14 | End: 2023-12-25 | Stop reason: SDUPTHER

## 2023-09-15 ENCOUNTER — OFFICE VISIT (OUTPATIENT)
Dept: MEDICAL GROUP | Facility: LAB | Age: 78
End: 2023-09-15
Payer: MEDICARE

## 2023-09-15 VITALS
HEART RATE: 76 BPM | BODY MASS INDEX: 31.01 KG/M2 | DIASTOLIC BLOOD PRESSURE: 74 MMHG | OXYGEN SATURATION: 95 % | TEMPERATURE: 96.5 F | SYSTOLIC BLOOD PRESSURE: 132 MMHG | RESPIRATION RATE: 16 BRPM | HEIGHT: 63 IN | WEIGHT: 175 LBS

## 2023-09-15 DIAGNOSIS — Z91.89 AT RISK FOR SLEEP APNEA: ICD-10-CM

## 2023-09-15 DIAGNOSIS — K52.9 GASTROENTERITIS: ICD-10-CM

## 2023-09-15 PROCEDURE — 3078F DIAST BP <80 MM HG: CPT | Performed by: FAMILY MEDICINE

## 2023-09-15 PROCEDURE — 99213 OFFICE O/P EST LOW 20 MIN: CPT | Performed by: FAMILY MEDICINE

## 2023-09-15 PROCEDURE — 3075F SYST BP GE 130 - 139MM HG: CPT | Performed by: FAMILY MEDICINE

## 2023-09-15 ASSESSMENT — FIBROSIS 4 INDEX: FIB4 SCORE: 2.54

## 2023-09-15 NOTE — PROGRESS NOTES
"Subjective:   Jackie GEE is a 78 y.o. female here today for   Chief Complaint   Patient presents with    Hospital Follow-up       #Gastroenteritis   -Patient recently hospitalized for dehydration 2/2 gastroenteritis on 9/8/23-9/9/23. She was given supportive measures and send to with zofran. Since then she continues to have occasional nausea.   -Patient has been sticking to simple diet. Denies any new vomiting, diarrhea. Denies any blood in stool, melena, vomiting, abdominal pain.   -Denies and fevers, chills, chest pain, SOB.     #At risk for sleep apnea   -Presents with concerns for sleep apnea.  She is feeling more fatigued recently, feels like her sleep is not as refreshing as it used to be.  She does have history of snoring although no witnessed apneic episodes.  During hospitalization she states that nurses did note some \"abnormal breathing\" at night and recommend patient follow-up for sleep apnea.  Patient does have a history of hypertension which is controlled.  Previous sleep study completed.    Allergies   Allergen Reactions    Morphine      Lowers heart rate    Erythromycin Nausea    Hydrocodone-Acetaminophen     Mastisol Adhesive [Mastisol] Rash    Meperidine Hcl Unspecified     [08/08/09 UNKNOWN, PLEASE VERIFY]    Other Drug      \"All narcotics make me nauseated\"         Current medicines (including changes today)  Current Outpatient Medications   Medication Sig Dispense Refill    omeprazole (PRILOSEC) 20 MG delayed-release capsule Take 2 Capsules by mouth 2 times a day. 60 Capsule 2    ondansetron (ZOFRAN ODT) 4 MG TABLET DISPERSIBLE Take 1 Tablet by mouth every 6 hours as needed for Nausea/Vomiting. (Patient not taking: Reported on 9/11/2023) 30 Tablet 0    ibuprofen (MOTRIN) 200 MG Tab Take 600 mg by mouth every 6 hours as needed. Indications: Pain (Patient not taking: Reported on 9/11/2023)      simvastatin (ZOCOR) 40 MG Tab Take 1 Tablet by mouth every evening. 100 Tablet 3    aspirin 81 MG " "EC tablet Take 81 mg by mouth every day.      sertraline (ZOLOFT) 100 MG Tab Take 1 Tablet by mouth every day for 360 days. 90 Tablet 3    atenolol (TENORMIN) 50 MG Tab Take 1 Tablet by mouth every day for 360 days. 100 Tablet 3    ezetimibe (ZETIA) 10 MG Tab Take 1 Tablet by mouth every day for 360 days. 90 Tablet 3    fluticasone (FLOVENT HFA) 110 MCG/ACT Aerosol Inhale 1 Puff 2 times a day. 12 g 1    albuterol 108 (90 Base) MCG/ACT Aero Soln inhalation aerosol Inhale 1 to 2 puffs by mouth every 4 to 6 hours as needed for wheezing and cough 8.5 g 2     No current facility-administered medications for this visit.     She  has a past medical history of Allergy, Anxiety, Arthritis, Asthma, Chest pressure (7/11/2022), Depression, Dysrhythmia, GERD (gastroesophageal reflux disease), Hair loss (10/27/2021), History of nonmelanoma skin cancer (03/21/2018), Hypertension, and Vaginal Papanicolaou smear not required due to history of hysterectomy (02/28/2013).    ROS   -See HPI       Objective:     Physical Exam:  /74   Pulse 76   Temp 35.8 °C (96.5 °F) (Temporal)   Resp 16   Ht 1.6 m (5' 2.99\")   Wt 79.4 kg (175 lb)   SpO2 95%  Body mass index is 31.01 kg/m².   Constitutional: Alert, no distress.  Skin: Warm, dry, good turgor, no rashes in visible areas.  Eye: Equal, round and reactive, conjunctiva clear, lids normal.  Respiratory: Unlabored respiratory effort, lungs clear to auscultation, no wheezes, no rhonchi.  Cardiovascular: Normal S1, S2, no murmur, no edema.  Abdomen: Soft, non-tender, no masses, no hepatosplenomegaly.  Psych: Alert and oriented x3, normal affect and mood.    STOPBANG - Sleep Apnea Screening      Flowsheet Row Most Recent Value   S - Have you been told that you SNORE? Yes   T - Are you often TIRED during the day? Yes   O - Do you know if you stop breathing or has anyone witnessed you stop breathing while you were asleep? (OBSTRUCTION) No   P - Do you have high blood PRESSURE or on " medication to control high blood pressure? Yes   B - Is your Body Mass Index greater than 35? (BMI) No   A - Are you 50 years old or older? (AGE) Yes   N - Are you a male with a NECK circumference greater than 17 inches, or a female with a neck circumference greater than 16 inches? No   G - Are you male? (GENDER) No   STOPBANG Total Score 4   LIZETH Risk Intermediate Risk            Assessment and Plan:     1. Gastroenteritis  -Patient doing well, symptoms resolved.   -Discussed advancing diet. Continue zofran as needed.   -Return precautions as needed.     2. At risk for sleep apnea  -STOP-BANG score completed, currently at 4.  Given this finding as well as findings from recent hospitalization we will go ahead and order overnight sleep study and follow-up with patient with results.  - Overnight Home Sleep Study; Future    Followup: No follow-ups on file.         PLEASE NOTE: This dictation was created using voice recognition software. I have made every reasonable attempt to correct obvious errors, but I expect that there are errors of grammar and possibly content that I did not discover before finalizing the note.

## 2023-09-20 PROCEDURE — RXMED WILLOW AMBULATORY MEDICATION CHARGE: Performed by: FAMILY MEDICINE

## 2023-09-21 ENCOUNTER — PHARMACY VISIT (OUTPATIENT)
Dept: PHARMACY | Facility: MEDICAL CENTER | Age: 78
End: 2023-09-21
Payer: COMMERCIAL

## 2023-09-26 ENCOUNTER — TELEPHONE (OUTPATIENT)
Dept: MEDICAL GROUP | Facility: LAB | Age: 78
End: 2023-09-26
Payer: MEDICARE

## 2023-09-26 NOTE — TELEPHONE ENCOUNTER
Jackie Wagner [facility]    [phone] (home)     Patient called regarding 9/28 appt. And questioning what it is for.  Patient stating that she needs her vision checked but she did not know what this appt. Was about.    Explained her [facility] assistant scheduled the appt. Because of the health care gaps and that it is recommended to have the retinas checked regularly if you have diabetes.    Patient states that she does not have diabetes.    Please call to clarify the need for the scheduled appt. On 9/28.

## 2023-09-27 ENCOUNTER — HOSPITAL ENCOUNTER (OUTPATIENT)
Dept: RADIOLOGY | Facility: MEDICAL CENTER | Age: 78
End: 2023-09-27
Payer: MEDICARE

## 2023-09-28 ENCOUNTER — TELEPHONE (OUTPATIENT)
Dept: HEALTH INFORMATION MANAGEMENT | Facility: OTHER | Age: 78
End: 2023-09-28
Payer: MEDICARE

## 2023-09-28 NOTE — TELEPHONE ENCOUNTER
After reviewing chart, Looks like ED listed Type 2 Diabetes on 09/08/2023 Hemoglobin last completed on 09/08/2023 with a level of 5.5 please advise DX code in chart and Care gaps on patient chart as well thank you.

## 2023-10-02 PROBLEM — E11.69 DIABETES MELLITUS TYPE 2 IN OBESE: Status: RESOLVED | Noted: 2023-09-08 | Resolved: 2023-10-02

## 2023-10-02 PROBLEM — E66.9 DIABETES MELLITUS TYPE 2 IN OBESE: Status: RESOLVED | Noted: 2023-09-08 | Resolved: 2023-10-02

## 2023-10-04 ENCOUNTER — HOME STUDY (OUTPATIENT)
Dept: SLEEP MEDICINE | Facility: MEDICAL CENTER | Age: 78
End: 2023-10-04
Attending: FAMILY MEDICINE
Payer: MEDICARE

## 2023-10-04 DIAGNOSIS — Z91.89 AT RISK FOR SLEEP APNEA: ICD-10-CM

## 2023-10-04 PROCEDURE — 95800 SLP STDY UNATTENDED: CPT | Performed by: STUDENT IN AN ORGANIZED HEALTH CARE EDUCATION/TRAINING PROGRAM

## 2023-10-06 PROBLEM — E66.9 OBESITY (BMI 30-39.9): Status: ACTIVE | Noted: 2023-10-06

## 2023-10-06 PROBLEM — G31.9 CEREBRAL ATROPHY (HCC): Status: ACTIVE | Noted: 2023-10-06

## 2023-10-06 PROBLEM — R94.30: Status: ACTIVE | Noted: 2023-10-06

## 2023-10-06 PROBLEM — I70.90 ATHEROSCLEROSIS: Status: ACTIVE | Noted: 2023-10-06

## 2023-10-12 NOTE — PROCEDURES
DIAGNOSTIC HOME SLEEP TEST (HST) REPORT WatchPAT      PATIENT ID:  NAME:  Jackie GEE  MRN:               [id number]  YOB: 1945  DATE OF STUDY: 10/4/2023      Impression:     This study shows evidence of:      1. Severe obstructive sleep apnea with 4% PAT apnea hypopnea index(pAHI) of 44.7 per hour.  PAT respiratory disturbance index (pRDI) was 62.1 per hour. These findings are based on 7 channels recording of PAT signal with sleep staging, heart rate, pulse oximetry, actigraphy, body position, snoring and respiratory movement.     2. Oxygenation O2 Sat. mean O2 sat was 89%,  david was 71%,  and maximum O2 at 97 %. O2 sat was at or  below 88% for 111.7 min of evaluation time. Oxygen Desaturation (>=4%) Index was 41.1/hr. AVG HR was 61 BPM.      TECHNICAL DESCRIPTION: Patient underwent home sleep apnea testing with peripheral arterial tone signal (WatchPAT™). This is a Type IV portable monitor and device per Medicare. Monitoring was done with 7 channels recording of PAT signal with sleep staging, heart rate, pulse oximetry, actigraphy, body position, snoring and respiratory movement. Prior to using the device, the patient received verbal and written instructions for its application and was provided with the help desk phone number for additional telephonic instruction with 24-hour availability of qualified personnel to answer questions.    Respiratory events:        General sleep summary: . Total recording time is 9 hours and 43 minutes and total Sleep time is 8 hours and 59 minutes. The patient spent 294 minutes in the supine position and 241 minutes in the nonsupine position.      Recommendations:    1. CPAP titration study vs Auto CPAP trial.  Given severity of sleep apnea and nocturnal hypoxia would recommend patient undergo an in lab CPAP titration study.  2.   In general patients with sleep apnea are advised to avoid alcohol and sedatives and to not operate a motor vehicle while  drowsy. In some cases alternative treatment options may prove effective in resolving sleep apnea in these options include upper airway surgery, the use of a dental orthotic or weight loss and positional therapy. Clinical correlation is required.         Arturo Rodrigues MD

## 2023-10-15 PROCEDURE — RXMED WILLOW AMBULATORY MEDICATION CHARGE: Performed by: FAMILY MEDICINE

## 2023-10-18 ENCOUNTER — PHARMACY VISIT (OUTPATIENT)
Dept: PHARMACY | Facility: MEDICAL CENTER | Age: 78
End: 2023-10-18
Payer: COMMERCIAL

## 2023-10-18 DIAGNOSIS — G47.33 OSA (OBSTRUCTIVE SLEEP APNEA): ICD-10-CM

## 2023-10-25 ASSESSMENT — ENCOUNTER SYMPTOMS: SLEEP DISTURBANCE: 0

## 2023-10-26 ENCOUNTER — OFFICE VISIT (OUTPATIENT)
Dept: SLEEP MEDICINE | Facility: MEDICAL CENTER | Age: 78
End: 2023-10-26
Attending: FAMILY MEDICINE
Payer: MEDICARE

## 2023-10-26 VITALS
HEART RATE: 73 BPM | OXYGEN SATURATION: 92 % | WEIGHT: 179 LBS | BODY MASS INDEX: 31.71 KG/M2 | HEIGHT: 63 IN | RESPIRATION RATE: 16 BRPM | SYSTOLIC BLOOD PRESSURE: 112 MMHG | DIASTOLIC BLOOD PRESSURE: 60 MMHG

## 2023-10-26 DIAGNOSIS — K21.9 GASTROESOPHAGEAL REFLUX DISEASE, UNSPECIFIED WHETHER ESOPHAGITIS PRESENT: ICD-10-CM

## 2023-10-26 DIAGNOSIS — G47.33 OSA (OBSTRUCTIVE SLEEP APNEA): ICD-10-CM

## 2023-10-26 DIAGNOSIS — I47.10 SUPRAVENTRICULAR TACHYCARDIA, PAROXYSMAL (HCC): ICD-10-CM

## 2023-10-26 DIAGNOSIS — F51.02 ADJUSTMENT INSOMNIA: ICD-10-CM

## 2023-10-26 DIAGNOSIS — G47.34 NOCTURNAL HYPOXIA: ICD-10-CM

## 2023-10-26 PROCEDURE — 99212 OFFICE O/P EST SF 10 MIN: CPT | Performed by: PREVENTIVE MEDICINE

## 2023-10-26 PROCEDURE — 3078F DIAST BP <80 MM HG: CPT | Performed by: PREVENTIVE MEDICINE

## 2023-10-26 PROCEDURE — 3074F SYST BP LT 130 MM HG: CPT | Performed by: PREVENTIVE MEDICINE

## 2023-10-26 PROCEDURE — 99204 OFFICE O/P NEW MOD 45 MIN: CPT | Performed by: PREVENTIVE MEDICINE

## 2023-10-26 RX ORDER — ZOLPIDEM TARTRATE 5 MG/1
5 TABLET ORAL NIGHTLY PRN
Qty: 2 TABLET | Refills: 0 | Status: SHIPPED | OUTPATIENT
Start: 2023-10-26 | End: 2024-02-21

## 2023-10-26 ASSESSMENT — FIBROSIS 4 INDEX: FIB4 SCORE: 2.54

## 2023-10-26 NOTE — PROGRESS NOTES
"CHIEF COMPLIANT: \"Establish care.\"  HISTORY OF PRESENT ILLNESS:   Jackie GEE is a 78 y.o. female kindly referred by Saeid Alvarado M.D. and  is here for a sleep medicine consultation. Pt reports chronic congestion .  And   she  uses   2 inhalers for treatment of asthma.  She was a direct referral for an HST per her PCP.     Sleep History:  Jackie GEE has never been tested for sleep apnea. The patient reports chronic congestion,  snores,  and restless sleep.  The patient goes to bed at 10 pm and wakes up at 8 am. It usually takes the patient approximately 30-90 mins to fall asleep.  ( She take melatonin and tylenol or Advil PM )  The patient does not feel refreshed and does not  nap during the day.   This pt is a former smoker (smoked 1 PPDS for 20 years and quit in 1985 ). Pt does not use cannabis X 2 years.  This pt does drink 1-2 alcoholic beverages per night and has 3 caffeinated beverages daily.     The patient denies any symptoms of narcolepsy such as sleep paralysis or cataplexy, or any symptoms that suggest parasomnias such as sleep walking or acting out of dreams.    Jackie GEE is a retired   [employer]     EPWORTH SCORE:     6 /24, this is NOT  consistent with EDS      TYPE:  Diagnostic - Watchpat HST  DATE:  10/4/2023  Diagnostic:AHI:  44.7, Supine AHI:  66.6  Diagnostic  Oxygen Bro: 71% with 111.7 mins at or under 88%     Significant comorbidities and modifying factors: see below    PAST MEDICAL HISTORY:  Past Medical History:   Diagnosis Date    Allergy     Anxiety     Arthritis     Asthma     Back pain     Back pain     Bronchitis     Cardiac arrhythmia     Chest pressure 07/11/2022    Chickenpox     Constipation     Cough     Depression     Dizziness     Dysrhythmia     Fatigue     GERD (gastroesophageal reflux disease)     Hair loss 10/27/2021    History of nonmelanoma skin cancer 03/21/2018    Formatting of this note might be different from the " original. 2016 right upper arm squamous cell carcinoma status post edc.  Plan: yearly dermatology exam    Hypertension     Mumps     Nasal drainage     Obesity     Painful joint     Ringing in ears     Tonsillitis     Vaginal Papanicolaou smear not required due to history of hysterectomy 02/28/2013    Formatting of this note might be different from the original. Cervix is surgically absent      PROBLEM LIST:  Patient Active Problem List    Diagnosis Date Noted    Obesity (BMI 30-39.9) 10/06/2023    Cerebral atrophy (HCC) 10/06/2023    Nonspecific abnormal results of function study, cardiovascular 10/06/2023    Atherosclerosis 10/06/2023    Nausea & vomiting 09/08/2023    Diarrhea 09/08/2023    BMI 32.0-32.9,adult 09/08/2023    Irritable bowel syndrome 07/14/2023    Asthma 07/11/2022    Essential hypertension 01/31/2020    Hyperlipidemia 05/15/2018    Hypogammaglobulinemia (HCC) 08/01/2017    Major depressive disorder, recurrent episode (HCC) 09/30/2014    Bilateral primary osteoarthritis of knee 03/13/2008    Supraventricular tachycardia, paroxysmal 10/31/2007    GERD (gastroesophageal reflux disease) 10/31/2007     PAST SOCIAL HISTORY:  Past Surgical History:   Procedure Laterality Date    ABDOMINAL HYSTERECTOMY TOTAL      ARTHROPLASTY      ARTHROSCOPY, KNEE      CARPAL TUNNEL RELEASE      EYE SURGERY      OTHER ORTHOPEDIC SURGERY Bilateral     knee replacements    MD REMV 2ND CATARACT,CORN-SCLER SECTN      TONSILLECTOMY      TUBAL COAGULATION LAPAROSCOPIC BILATERAL       PAST FAMILY HISTORY:  Family History   Problem Relation Age of Onset    Cancer Father         Prostate    Diabetes Father     Heart Disease Father     Prostate cancer Father     Colorectal Cancer Maternal Grandfather     Glaucoma Maternal Grandfather     Colon Cancer Maternal Grandfather     Heart Disease Maternal Grandmother      SOCIAL HISTORY:  Social History     Socioeconomic History    Marital status:      Spouse name: Not on file     Number of children: Not on file    Years of education: Not on file    Highest education level: 12th grade   Occupational History    Not on file   Tobacco Use    Smoking status: Former     Current packs/day: 0.00     Average packs/day: 1 pack/day for 56.0 years (56.0 ttl pk-yrs)     Types: Cigarettes     Start date: 10/5/1964     Quit date: 10/10/1985     Years since quittin.0    Smokeless tobacco: Never   Vaping Use    Vaping Use: Never used   Substance and Sexual Activity    Alcohol use: Yes     Alcohol/week: 4.2 oz     Types: 7 Glasses of wine per week     Comment: occ    Drug use: No    Sexual activity: Yes     Partners: Male     Birth control/protection: Female Sterilization, Post-Menopausal   Other Topics Concern    Not on file   Social History Narrative    Not on file     Social Determinants of Health     Financial Resource Strain: Unknown (3/7/2023)    Overall Financial Resource Strain (CARDIA)     Difficulty of Paying Living Expenses: Patient refused   Food Insecurity: No Food Insecurity (3/7/2023)    Hunger Vital Sign     Worried About Running Out of Food in the Last Year: Never true     Ran Out of Food in the Last Year: Never true   Transportation Needs: Unmet Transportation Needs (3/7/2023)    PRAPARE - Transportation     Lack of Transportation (Medical): Yes     Lack of Transportation (Non-Medical): No   Physical Activity: Sufficiently Active (3/7/2023)    Exercise Vital Sign     Days of Exercise per Week: 5 days     Minutes of Exercise per Session: 50 min   Stress: Stress Concern Present (3/7/2023)    [employer] [place] [employer] - Occupational Stress Questionnaire     Feeling of Stress : To some extent   Social Connections: Moderately Integrated (3/7/2023)    Social Connection and Isolation Panel [NHANES]     Frequency of Communication with Friends and Family: More than three times a week     Frequency of Social Gatherings with Friends and Family: More than three times a week      Attends Religion Services: Never     Active Member of Clubs or Organizations: Yes     Attends Club or Organization Meetings: 1 to 4 times per year     Marital Status:    Intimate Partner Violence: Not on file   Housing Stability: Low Risk  (3/7/2023)    Housing Stability Vital Sign     Unable to Pay for Housing in the Last Year: No     Number of Places Lived in the Last Year: 1     Unstable Housing in the Last Year: No     ALLERGIES: Morphine, Erythromycin, Hydrocodone-acetaminophen, Mastisol adhesive [mastisol], Meperidine hcl, and Other drug  MEDICATIONS:  Current Outpatient Medications   Medication Sig Dispense Refill    zolpidem (AMBIEN) 5 MG Tab Take 1 Tablet by mouth at bedtime as needed for Sleep (for sleep study) for up to 2 doses. Two tablets  make  up a one day dose 2 Tablet 0    omeprazole (PRILOSEC) 20 MG delayed-release capsule Take 2 Capsules by mouth 2 times a day. 60 Capsule 2    simvastatin (ZOCOR) 40 MG Tab Take 1 Tablet by mouth every evening. 100 Tablet 3    aspirin 81 MG EC tablet Take 81 mg by mouth every day.      sertraline (ZOLOFT) 100 MG Tab Take 1 Tablet by mouth every day for 360 days. 90 Tablet 3    atenolol (TENORMIN) 50 MG Tab Take 1 Tablet by mouth every day for 360 days. 100 Tablet 3    ezetimibe (ZETIA) 10 MG Tab Take 1 Tablet by mouth every day for 360 days. 90 Tablet 3    fluticasone (FLOVENT HFA) 110 MCG/ACT Aerosol Inhale 1 Puff 2 times a day. 12 g 1    albuterol 108 (90 Base) MCG/ACT Aero Soln inhalation aerosol Inhale 1 to 2 puffs by mouth every 4 to 6 hours as needed for wheezing and cough 8.5 g 2    ondansetron (ZOFRAN ODT) 4 MG TABLET DISPERSIBLE Take 1 Tablet by mouth every 6 hours as needed for Nausea/Vomiting. (Patient not taking: Reported on 9/11/2023) 30 Tablet 0    ibuprofen (MOTRIN) 200 MG Tab Take 600 mg by mouth every 6 hours as needed. Indications: Pain (Patient not taking: Reported on 9/11/2023)       No current facility-administered medications for this  "visit.    \"CURRENT RX\"    REVIEW OF SYSTEMS:  Constitutional: Denies weight loss, endorses chronic daytime fatigue --also see HPI    PHYSICAL EXAM/VITALS:  /60 (BP Location: Left arm, Patient Position: Sitting, BP Cuff Size: Adult)   Pulse 73   Resp 16   Ht 1.6 m (5' 3\")   Wt 81.2 kg (179 lb)   SpO2 92%   BMI 31.71 kg/m²   Neck circumference (inches): 16.5   Appearance: Well-nourished, well-developed,  looks stated age, no acute distress  Eyes:   EOMI  ENMT: Mallampati:4    Neck: Supple, trachea midline  Respiratory effort:  No intercostal retractions or use of accessory muscles  Lung auscultation:  No wheezes rhonchi rubs or rales  Cardiac: No murmurs, rubs, or gallops; regular rhythm, normal rate; no edema  Musculoskeletal:  Grossly normal; gait and station normal  Neurologic:  oriented to person, time, place, and purpose; judgement intact  Psychiatric:  No depression, anxiety, agitation    MEDICAL DECISION MAKING:  The medical record was reviewed as it pertains to this referral. This includes records from primary care,consultants notes, referral request, hospital records, labs and imaging. Any available diagnostic and titration nocturnal polysomnograms, home sleep apnea tests, continuous nocturnal oximetry results, multiple sleep latency tests, and recent compliance reports were reviewed with the patient.    ASSESSMENT/PLAN:  Jackie GEE is a 78 y.o. female  who has severe LIZETH and severe positional sleep apnea.  She also has severe nocturnal hypoxia.  Given these findings, she needs to be titrated in lab.      DIAGNOSES :    1. LIZETH (obstructive sleep apnea)  - Polysomnography Titration    2. Nocturnal hypoxia  - Polysomnography Titration    3. Gastroesophageal reflux disease, unspecified whether esophagitis present  - Polysomnography Titration    4. Supraventricular tachycardia, paroxysmal  - Polysomnography Titration    5. Adjustment insomnia  - zolpidem (AMBIEN) 5 MG Tab; Take 1 Tablet by " mouth at bedtime as needed for Sleep (for sleep study) for up to 2 doses. Two tablets  make  up a one day dose  Dispense: 2 Tablet; Refill: 0    The patient has signs and symptoms consistent with obstructive sleep apnea hypopnea syndrome. Will schedule a nocturnal polysomnogram or a home sleep apnea test (please see plan).  The risks of untreated sleep apnea were discussed with the patient at length. Patients with LIZETH are at increased risk of cardiovascular disease including coronary artery disease, systemic arterial hypertension, pulmonary arterial hypertension, cardiac arrhythmias, and stroke. LIZETH patients have an increased risk of motor vehicle accidents, type 2 diabetes, chronic kidney disease, and non-alcoholic liver disease. The patient was advised to avoid driving a motor vehicle when drowsy.  Have advised the patient to follow up with the appropriate healthcare practitioners for all other medical problems and issues.    RETURN TO CLINIC: Return for 3 weeks after SS - discuss results w/ any provider.    My total time spent caring for the patient on the day of the encounter was 40 minutes. This includes time spent on a thorough chart review including other physician notes, all sleep studies, as well as critical labs and pulmonary and cardiac studies.  Additionally, it includes a thorough discussion of good sleep hygiene and stimulus control, as well as  the need for consistency in terms of sleep preparation and practice.    Please note that this dictation was created using voice recognition software.  I have made every reasonable attempt to correct obvious errors, I expect that there are errors of grammar and possibly content that I did not discover before finalizing this note.                        Answers submitted by the patient for this visit:  Sleep Center Questionnaire (Submitted on 10/25/2023)  Year of your last physical exam: 09/15/2023  Results of exam: Decided to have sleep study  Occupation :  Retired  Height: 5’3””  Current weight: 179  6 months ago: 180  At age 22: 120  What is the reason for your visit today?: Sleep study consultation  Name of person referring you to the Sleep Center: Dr Dillard  Have you ever been hospitalized?: Yes  Reason, year, and hospital in which you were hospitalized:: Sept. 8,2023, virul stomach upset  Have you ever had problems with anesthesia?: Yes  Have you experienced post-operative delirium?: No  Any complications with surgery?: No  What year did you receive your last Flu shot?: 10/2023  What year did you receive you last Pneumonia shot?: 3 shots 2022  Have you had a TB skin test? If so, please list the year and result:: Dont know when  Have you had Allergy skin testing? If so, please list the year and result:: Many years ago  Please briefly describe your sleep problem and how old you were when it began.: Dont know  How does this affect your daily life and activities? Please also rate how serious of a problem this is (1 = Not at all, 10 = Very Serious).: Don’t know  Have you had any previous evaluations, examinations, or treatment for this sleep problem or any other problems with your sleep? If so, please describe the evaluation, treatment, and results.: Home sleep study a few weeks ago  Have you used any medications (prescribed or otherwise) to help your sleep problem? If yes, include name, amount, frequency, and the prescribing physician.: Melatonin & either tylenol pm or ibuprofen pm or allergy pill  If employed, what time do you usually start and end work?: Retired  Do you ever change work shifts? If yes, describe how often (never, infrequently, regularly).: no  What time do you usually go to bed and wake up on: Weekdays? Weekends?: 10 pm /8 am  Do you have a regular bed partner?: Yes  How many minutes does it usually take to fall asleep at night after turning off the lights?: Varies  What do you ordinarily do just prior to turning out the lights and attempting to  go to sleep (e.g., reading, TV, baths, etc.)?: WTch tv  On average, how many times do you wake up during the night?: 5 or more  On average, how many times do you wake up to use the bathroom?: Usually twice  Do you often wake up too early in the morning and are unable to return to sleep?: I can go back to sleep  On average, how many hours of sleep do you get per night?: According to my fitbit since July 3 to 5 hrs  How do you usually awaken?  Alarm, spontaneously, or other?: Spontaneously  Is it difficult for you to awaken and get out of bed after sleeping? (Not at all, Sometimes, Very): Yes  Do you nap or return to bed after arising?: No  Are you bothered by sleepiness during the day?: Yes & no energy  Do you feel that you get too much sleep at night?: No  Do you feel that you get too little sleep at night?: Yeshusband has Alzheimer’s. Im under a lot of pressure & i seem restless  Do you usually feel tired during the day? If so, what do you attribute this to?: Pressure & stffy nose  Do you find yourself falling asleep when you don't mean to? : Maikel seldom  Do you feel rested or refreshed after the sleep episode?: No  Have you ever suddenly fallen?: No  Have you ever experienced sudden body weakness?: No  Have you ever experienced weakness or paralysis upon going to sleep?: No  Have you ever experienced weakness or paralysis upon awakening from sleep?: No  Have you ever experienced seeing things or hearing voices/noises: That weren't real? On going to sleep? During the night? On awakening from sleep? During the day?: No  Do you have difficulty breathing at night? If yes, briefly describe.: Always congested & noises in my chest  How many times per week?: Every night  How did you become aware of this, at what age did this first occur, and how many years has this occurred?: Maybe 2 years  Have you been told you snore while asleep? If so, does it disturb a bed partner (or someone in the same room), or someone in the next  "room?: Yes  Have you ever experienced doing something without being aware of the action? If yes, please describe.: No  Have you ever experienced upon lying in bed before sleep or on awakening from sleep: Restlessness of legs, \"nervous legs\", \"creeping crawling\" sensation of legs, or twitching of legs?: Moving legs around in bed & moving my big oed while sitting  How many times per week does this occur, and how many minutes does the sensation last?: Frequently  At what age did this first occur, and how many years has this occurred?: Dont know  Have you ever been told that your arms or legs jerk or twitch while you are asleep? If yes, how many times per night does this occur?: No  Does this seem to awaken you from your sleep?: No  Do you know, or have you ever been told that you do any of the following while sleeping: talk, walk, grit teeth, wet the bed, wake up screaming or seemingly afraid, have disturbing dreams, have unusual movements, wake up with headaches, (males) have erections? If yes to any of these, please indicate how many times per week, age started, last occurrence, treatment received.: Talking, grunting,crying  Has anyone in your family been known to have any sleep problems? If yes, please list the type of problem (e.g., trouble getting to sleep, too sleepy, bed wetting, etc.), the relationship of this person to you, and the treatment received.: Dont know    "

## 2023-10-30 PROCEDURE — RXMED WILLOW AMBULATORY MEDICATION CHARGE: Performed by: PREVENTIVE MEDICINE

## 2023-11-02 ENCOUNTER — PHARMACY VISIT (OUTPATIENT)
Dept: PHARMACY | Facility: MEDICAL CENTER | Age: 78
End: 2023-11-02
Payer: COMMERCIAL

## 2023-11-03 ENCOUNTER — OFFICE VISIT (OUTPATIENT)
Dept: MEDICAL GROUP | Facility: LAB | Age: 78
End: 2023-11-03
Payer: MEDICARE

## 2023-11-03 VITALS
SYSTOLIC BLOOD PRESSURE: 110 MMHG | OXYGEN SATURATION: 94 % | TEMPERATURE: 96.2 F | RESPIRATION RATE: 16 BRPM | HEIGHT: 63 IN | BODY MASS INDEX: 31.89 KG/M2 | HEART RATE: 71 BPM | DIASTOLIC BLOOD PRESSURE: 72 MMHG | WEIGHT: 180 LBS

## 2023-11-03 DIAGNOSIS — G47.33 OSA (OBSTRUCTIVE SLEEP APNEA): ICD-10-CM

## 2023-11-03 DIAGNOSIS — H81.10 BENIGN PAROXYSMAL POSITIONAL VERTIGO, UNSPECIFIED LATERALITY: ICD-10-CM

## 2023-11-03 PROCEDURE — 99214 OFFICE O/P EST MOD 30 MIN: CPT | Performed by: FAMILY MEDICINE

## 2023-11-03 PROCEDURE — 3078F DIAST BP <80 MM HG: CPT | Performed by: FAMILY MEDICINE

## 2023-11-03 PROCEDURE — 3074F SYST BP LT 130 MM HG: CPT | Performed by: FAMILY MEDICINE

## 2023-11-03 ASSESSMENT — FIBROSIS 4 INDEX: FIB4 SCORE: 2.54

## 2023-11-03 NOTE — PROGRESS NOTES
"Subjective:   Jackie GEE is a 78 y.o. female here today for   Chief Complaint   Patient presents with    Vertigo     X Dizzy spells,      #Vertigo   -Been off and on for several years.   -Usually occurs in bed, turing from one side or another. Has also noted symptoms when completed P[place]ates.   -Symptoms can last for several seconds to minutes   -Patient feels like dizziness is getting worse over the last year.   -Pertinent history includes trauma to head that occurred 6 years ago, dizziness started soon after that. Other hx includes whiplash remotely.   -Denies any palpitations, n/v, syncope   -Has been evaluated by ENT.     #LIZETH   -Recent at home sleep study completed showing severe sleep apnea with hypoxia. Sleep medicine recommended repeat sleep study in lab due to the severity. This is scheduled for next week.       Allergies   Allergen Reactions    Morphine      Lowers heart rate    Erythromycin Nausea    Hydrocodone-Acetaminophen     Mastisol Adhesive [Mastisol] Rash    Meperidine Hcl Unspecified     [08/08/09 UNKNOWN, PLEASE VERIFY]    Other Drug      \"All narcotics make me nauseated\"         Current medicines (including changes today)  Current Outpatient Medications   Medication Sig Dispense Refill    zolpidem (AMBIEN) 5 MG Tab Take 1 Tablet by mouth at bedtime as needed for Sleep (for sleep study) for up to 2 doses. Two tablets  make  up a one day dose 2 Tablet 0    omeprazole (PRILOSEC) 20 MG delayed-release capsule Take 2 Capsules by mouth 2 times a day. 60 Capsule 2    ondansetron (ZOFRAN ODT) 4 MG TABLET DISPERSIBLE Take 1 Tablet by mouth every 6 hours as needed for Nausea/Vomiting. (Patient not taking: Reported on 9/11/2023) 30 Tablet 0    ibuprofen (MOTRIN) 200 MG Tab Take 600 mg by mouth every 6 hours as needed. Indications: Pain (Patient not taking: Reported on 9/11/2023)      simvastatin (ZOCOR) 40 MG Tab Take 1 Tablet by mouth every evening. 100 Tablet 3    aspirin 81 MG EC tablet Take 81 " "mg by mouth every day.      sertraline (ZOLOFT) 100 MG Tab Take 1 Tablet by mouth every day for 360 days. 90 Tablet 3    atenolol (TENORMIN) 50 MG Tab Take 1 Tablet by mouth every day for 360 days. 100 Tablet 3    ezetimibe (ZETIA) 10 MG Tab Take 1 Tablet by mouth every day for 360 days. 90 Tablet 3    fluticasone (FLOVENT HFA) 110 MCG/ACT Aerosol Inhale 1 Puff 2 times a day. 12 g 1    albuterol 108 (90 Base) MCG/ACT Aero Soln inhalation aerosol Inhale 1 to 2 puffs by mouth every 4 to 6 hours as needed for wheezing and cough 8.5 g 2     No current facility-administered medications for this visit.     She  has a past medical history of Allergy, Anxiety, Arthritis, Asthma, Back pain, Back pain, Bronchitis, Cardiac arrhythmia, Chest pressure (07/11/2022), Chickenpox, Constipation, Cough, Depression, Dizziness, Dysrhythmia, Fatigue, GERD (gastroesophageal reflux disease), Hair loss (10/27/2021), History of nonmelanoma skin cancer (03/21/2018), Hypertension, Mumps, Nasal drainage, Obesity, Painful joint, Ringing in ears, Tonsillitis, and Vaginal Papanicolaou smear not required due to history of hysterectomy (02/28/2013).    ROS   -See HPI       Objective:     Physical Exam:  /72   Pulse 71   Temp (!) 35.7 °C (96.2 °F) (Temporal)   Resp 16   Ht 1.6 m (5' 2.99\")   Wt 81.6 kg (180 lb)   SpO2 94%  Body mass index is 31.89 kg/m².   Constitutional: Alert, no distress, well-groomed.  Skin: No rashes in visible areas.  Eye: Round. Conjunctiva clear, lids normal. No icterus.   ENMT: Lips pink without lesions, good dentition, moist mucous membranes. Phonation normal.  Neck: No masses, no thyromegaly. Moves freely without pain.  Respiratory: Unlabored respiratory effort, no cough or audible wheeze  Psych: Alert and oriented x3, normal affect and mood.     Assessment and Plan:     1. Benign paroxysmal positional vertigo, unspecified laterality  -Spent time reviewing symptoms, discussing red flag symptoms.  At this time, " patient presents with symptoms that are consistent with BPPV.  Discussed use of Epley maneuver as needed for symptoms.  At this time, given significant sleep apnea seen on home sleep study, I do question how much of her symptoms could be due to significant fatigue from sleep apnea.  -Patient also has history of significant allergies and sinus congestion.  We will begin treatment with intranasal corticosteroid.  -Follow-up precautions given, patient will return as needed.    2. LIZETH (obstructive sleep apnea)  -Further evaluation is needed, sleep study has been scheduled.  We will follow-up with sleep medicine for further discussion regarding treatment.    My total time spent caring for the patient on the day of the encounter was 30 minutes.   This does not include time spent on separately billable procedures/tests.        Followup: No follow-ups on file.         PLEASE NOTE: This dictation was created using voice recognition software. I have made every reasonable attempt to correct obvious errors, but I expect that there are errors of grammar and possibly content that I did not discover before finalizing the note.

## 2023-11-05 ENCOUNTER — SLEEP STUDY (OUTPATIENT)
Dept: SLEEP MEDICINE | Facility: MEDICAL CENTER | Age: 78
End: 2023-11-05
Attending: PREVENTIVE MEDICINE
Payer: MEDICARE

## 2023-11-05 DIAGNOSIS — G47.34 NOCTURNAL HYPOXIA: ICD-10-CM

## 2023-11-05 DIAGNOSIS — I47.10 SUPRAVENTRICULAR TACHYCARDIA, PAROXYSMAL (HCC): ICD-10-CM

## 2023-11-05 DIAGNOSIS — K21.9 GASTROESOPHAGEAL REFLUX DISEASE, UNSPECIFIED WHETHER ESOPHAGITIS PRESENT: ICD-10-CM

## 2023-11-05 DIAGNOSIS — G47.33 OSA (OBSTRUCTIVE SLEEP APNEA): ICD-10-CM

## 2023-11-05 PROCEDURE — 95811 POLYSOM 6/>YRS CPAP 4/> PARM: CPT | Performed by: PREVENTIVE MEDICINE

## 2023-11-06 ENCOUNTER — APPOINTMENT (OUTPATIENT)
Dept: RADIOLOGY | Facility: MEDICAL CENTER | Age: 78
End: 2023-11-06
Attending: FAMILY MEDICINE
Payer: MEDICARE

## 2023-11-06 DIAGNOSIS — Z12.31 VISIT FOR SCREENING MAMMOGRAM: ICD-10-CM

## 2023-11-06 PROCEDURE — 77063 BREAST TOMOSYNTHESIS BI: CPT

## 2023-11-06 NOTE — PROCEDURES
Patient: SANDRA GEE  ID: [id number] Date: 11/5/2023 Exam No.:   MONTAGE: Standard  STUDY TYPE: Treatment  RECORDING TECHNIQUE:   After the scalp was prepared, gold plated electrodes were applied to the scalp according to the International 10-20 System. EEG (electroencephalogram) was continuously monitored from the O1-M2, O2-M1, C3-M2, C4-M1, F3-M2, and F4-M1. EOGs (electrooculograms) were monitored by electrodes placed at the left and right outer canthi. Chin EMG (electromyogram) was monitored by electrodes placed on the mentalis and sub-mentalis muscles. Nasal and oral airflow were monitored using a triple port thermocouple as well as oronasal pressure transducer. Respiratory effort was measured by inductive plethysmography technology employing abdominal and thoracic belts. Blood oxygen saturation and pulse were monitored by pulse oximetry. Heart rhythm was monitored by surface electrocardiogram. Leg EMG was studied using surface electrodes placed on left and right anterior tibialis. A microphone was used to monitor tracheal sounds and snoring. Body position was monitored and documented by technician observation.   SCORING CRITERIA:   A modification of the AASM manual for scoring of sleep and associated events was used. Obstructive apneas were scored by cessation of airflow for at least 10 seconds with continuing respiratory effort. Central apneas were scored by cessation of airflow for at least 10 seconds with no respiratory effort. Hypopneas were scored by a 30% or more reduction in airflow for at least 10 seconds accompanied by arterial oxygen desaturation of 3% or an arousal. For CMS (Medicare) patients, per AASM rule 1B, hypopneas are scored by 30% with mild reduction in airflow for at least 10 seconds accompanied by arterial saturation decreased at 4%.  Study start time was 08:58:22 PM. Diagnostic recording time was 9h 9.0m with a total sleep time of 7h 2.5m resulting in a sleep efficiency of 76.96%%. Sleep  latency from the start of the study was 74 minutes and the latency from sleep to REM was 330 minutes. In total,192 arousals were scored for an arousal index of 27.3.  Respiratory:  There were a total of 35 apneas consisting of 17 obstructive apneas, 0 mixed apneas, and 18 central apneas. A total of 90 hypopneas were scored. The apnea index was 4.97 per hour and the hypopnea index was 12.78 per hour resulting in an overall AHI of 17.75. AHI during REM was 0.8 and AHI while supine was 23.41.  Oximetry:  There was a mean oxygen saturation of 88.0%. The minimum oxygen saturation in NREM was 76.0 % and in REM was 86.0%. The patient spent 267.4 minutes of TST with SaO2 <88%.  Cardiac:  The highest heart rate seen while awake was 92 BPM while the highest heart rate during sleep was 98 BPM with an average sleeping heart rate of 68 BPM.  Limb Movements:  There were a total of 112 PLMs during sleep which resulted in a PLMS index of 15.9. Of these, 101 were associated with arousals which resulted in a PLMS arousal index of 14.3.  Titration:   This was a fully attended sleep study. This test was technically adequate. CPAP was tried was tried from 5 to 15cm H2O. BiPAP was tried from 16/12 to 24/20cm H2O  Assessment:   Moderate Obstructive Sleep Apnea Hypopnea - AHI 17.75,  Supine AHI 23.41  Severe Nocturnal oxygen desaturation - david saturation 76% - saturations <88% below for 267.4 minutes of TST.  Elevated PLMS at 15.9  Recommendation:   This patient should do well on a ResMed BIPAP machine with initial pressures of 22/16 and a PS of 4 cm H2O.  This patient also did well on several CPAP pressures but the Bilevel machine can be adapted to CPAP pressures in the future if needed.  This patient will need an oxygen bleed in of 2L/min.  This patient will need to meet all insurance requirements for compliance.     not tested

## 2023-11-20 PROCEDURE — RXMED WILLOW AMBULATORY MEDICATION CHARGE: Performed by: FAMILY MEDICINE

## 2023-11-27 ENCOUNTER — PHARMACY VISIT (OUTPATIENT)
Dept: PHARMACY | Facility: MEDICAL CENTER | Age: 78
End: 2023-11-27
Payer: COMMERCIAL

## 2023-11-27 PROCEDURE — RXMED WILLOW AMBULATORY MEDICATION CHARGE: Performed by: FAMILY MEDICINE

## 2023-12-25 PROCEDURE — RXMED WILLOW AMBULATORY MEDICATION CHARGE: Performed by: FAMILY MEDICINE

## 2023-12-26 PROCEDURE — RXMED WILLOW AMBULATORY MEDICATION CHARGE: Performed by: INTERNAL MEDICINE

## 2023-12-26 RX ORDER — OMEPRAZOLE 20 MG/1
40 CAPSULE, DELAYED RELEASE ORAL 2 TIMES DAILY
Qty: 200 CAPSULE | Refills: 2 | Status: SHIPPED | OUTPATIENT
Start: 2023-12-26

## 2023-12-27 ENCOUNTER — PHARMACY VISIT (OUTPATIENT)
Dept: PHARMACY | Facility: MEDICAL CENTER | Age: 78
End: 2023-12-27
Payer: COMMERCIAL

## 2023-12-28 ENCOUNTER — APPOINTMENT (OUTPATIENT)
Dept: SLEEP MEDICINE | Facility: MEDICAL CENTER | Age: 78
End: 2023-12-28
Attending: PREVENTIVE MEDICINE
Payer: MEDICARE

## 2024-01-14 NOTE — PROGRESS NOTES
CC: Titration results        HPI:  Jackie GEE is a 78 y.o.female  kindly referred by Saeid Alvarado M.D. who last saw Dr. ALCARAZ on 10/26/2023 for snoring and restless sleep.  A home study performed 10/4/2023 showed severe LIZETH with a AHI of 44.7, a david saturation of 71%, and saturations at or below 88% 411.7 minutes of the evaluation time.    The patient's PSG titration was performed on 11/5/2023 and showed moderate LIZETH with an AHI of 17.5, a supine AHI of 23.4, a david saturation of 76%, and saturations less than 88% for 267.4 minutes.  The CPAP and BiPAP titration were somewhat inconsistent.  The patient did normalize the apnea-hypopnea index on several CPAP pressures and bilevel pressures.    Will order auto titrating BiPAP with a minimum EPAP of 12 CWP and a maximum IPAP of 24 CWP and pressure support 4 CWP using a mask of choice and heated humidification followed by clinical and compliance review 31 to 90 days after she receives her equipment.  She will also require O2 bleed-in at 2 LPM as saturations remained low on all tested pressures.    Past medical history significant for obesity, cerebral atrophy, atherosclerosis, IBS, asthma, essential hypertension, hypogammaglobulinemia, dyslipidemia, depression, SVT, GERD, and former smoker.  Her  just went into a memory unit yesterday after 2-week hospitalization at [facility].  She is under a lot of stress.        Patient Active Problem List    Diagnosis Date Noted    Obesity (BMI 30-39.9) 10/06/2023    Cerebral atrophy (HCC) 10/06/2023    Nonspecific abnormal results of function study, cardiovascular 10/06/2023    Atherosclerosis 10/06/2023    Nausea & vomiting 09/08/2023    Diarrhea 09/08/2023    BMI 32.0-32.9,adult 09/08/2023    Irritable bowel syndrome 07/14/2023    Asthma 07/11/2022    Essential hypertension 01/31/2020    Hyperlipidemia 05/15/2018    Hypogammaglobulinemia (HCC) 08/01/2017    Major depressive disorder, recurrent  episode (HCC) 09/30/2014    Bilateral primary osteoarthritis of knee 03/13/2008    Supraventricular tachycardia, paroxysmal 10/31/2007    GERD (gastroesophageal reflux disease) 10/31/2007       Past Medical History:   Diagnosis Date    Allergy     Anxiety     Arthritis     Asthma     Back pain     Back pain     Bronchitis     Cardiac arrhythmia     Chest pressure 07/11/2022    Chickenpox     Constipation     Cough     Depression     Dizziness     Dysrhythmia     Fatigue     GERD (gastroesophageal reflux disease)     Hair loss 10/27/2021    History of nonmelanoma skin cancer 03/21/2018    Formatting of this note might be different from the original. 2016 right upper arm squamous cell carcinoma status post edc.  Plan: yearly dermatology exam    Hypertension     Mumps     Nasal drainage     Obesity     Painful joint     Ringing in ears     Tonsillitis     Vaginal Papanicolaou smear not required due to history of hysterectomy 02/28/2013    Formatting of this note might be different from the original. Cervix is surgically absent        Past Surgical History:   Procedure Laterality Date    ABDOMINAL HYSTERECTOMY TOTAL      ARTHROPLASTY      ARTHROSCOPY, KNEE      CARPAL TUNNEL RELEASE      EYE SURGERY      OTHER ORTHOPEDIC SURGERY Bilateral     knee replacements    ID REMV 2ND CATARACT,CORN-SCLER SECTN      TONSILLECTOMY      TUBAL COAGULATION LAPAROSCOPIC BILATERAL         Family History   Problem Relation Age of Onset    Cancer Father         Prostate    Diabetes Father     Heart Disease Father     Prostate cancer Father     Colorectal Cancer Maternal Grandfather     Glaucoma Maternal Grandfather     Colon Cancer Maternal Grandfather     Heart Disease Maternal Grandmother        Social History     Socioeconomic History    Marital status:      Spouse name: Not on file    Number of children: Not on file    Years of education: Not on file    Highest education level: 12th grade   Occupational History    Not on file    Tobacco Use    Smoking status: Former     Current packs/day: 0.00     Average packs/day: 1 pack/day for 56.0 years (56.0 ttl pk-yrs)     Types: Cigarettes     Start date: 10/5/1964     Quit date: 10/10/1985     Years since quittin.2    Smokeless tobacco: Never   Vaping Use    Vaping Use: Never used   Substance and Sexual Activity    Alcohol use: Yes     Alcohol/week: 4.2 oz     Types: 7 Glasses of wine per week     Comment: occ    Drug use: No    Sexual activity: Yes     Partners: Male     Birth control/protection: Female Sterilization, Post-Menopausal   Other Topics Concern    Not on file   Social History Narrative    Not on file     Social Determinants of Health     Financial Resource Strain: Unknown (3/7/2023)    Overall Financial Resource Strain (CARDIA)     Difficulty of Paying Living Expenses: Patient refused   Food Insecurity: No Food Insecurity (3/7/2023)    Hunger Vital Sign     Worried About Running Out of Food in the Last Year: Never true     Ran Out of Food in the Last Year: Never true   Transportation Needs: Unmet Transportation Needs (3/7/2023)    PRAPARE - Transportation     Lack of Transportation (Medical): Yes     Lack of Transportation (Non-Medical): No   Physical Activity: Sufficiently Active (3/7/2023)    Exercise Vital Sign     Days of Exercise per Week: 5 days     Minutes of Exercise per Session: 50 min   Stress: Stress Concern Present (3/7/2023)    [employer] - Occupational Stress Questionnaire     Feeling of Stress : To some extent   Social Connections: Moderately Integrated (3/7/2023)    Social Connection and Isolation Panel [NHANES]     Frequency of Communication with Friends and Family: More than three times a week     Frequency of Social Gatherings with Friends and Family: More than three times a week     Attends Congregation Services: Never     Active Member of Clubs or Organizations: Yes     Attends Club or Organization Meetings: 1 to 4 times per year  "    Marital Status:    Intimate Partner Violence: Not on file   Housing Stability: Low Risk  (3/7/2023)    Housing Stability Vital Sign     Unable to Pay for Housing in the Last Year: No     Number of Places Lived in the Last Year: 1     Unstable Housing in the Last Year: No       Current Outpatient Medications   Medication Sig Dispense Refill    omeprazole (PRILOSEC) 20 MG delayed-release capsule Take 2 Capsules by mouth 2 times a day. 200 Capsule 2    simvastatin (ZOCOR) 40 MG Tab Take 1 Tablet by mouth every evening. 100 Tablet 3    sertraline (ZOLOFT) 100 MG Tab Take 1 Tablet by mouth every day for 360 days. 90 Tablet 3    atenolol (TENORMIN) 50 MG Tab Take 1 Tablet by mouth every day for 360 days. 100 Tablet 3    ezetimibe (ZETIA) 10 MG Tab Take 1 Tablet by mouth every day for 360 days. 90 Tablet 3    fluticasone (FLOVENT HFA) 110 MCG/ACT Aerosol Inhale 1 Puff 2 times a day. 12 g 1    albuterol 108 (90 Base) MCG/ACT Aero Soln inhalation aerosol Inhale 1 to 2 puffs by mouth every 4 to 6 hours as needed for wheezing and cough 8.5 g 2    zolpidem (AMBIEN) 5 MG Tab Take 1 Tablet by mouth at bedtime as needed for Sleep (for sleep study) for up to 2 doses. Two tablets  make  up a one day dose 2 Tablet 0    ondansetron (ZOFRAN ODT) 4 MG TABLET DISPERSIBLE Take 1 Tablet by mouth every 6 hours as needed for Nausea/Vomiting. (Patient not taking: Reported on 9/11/2023) 30 Tablet 0    ibuprofen (MOTRIN) 200 MG Tab Take 600 mg by mouth every 6 hours as needed. Indications: Pain (Patient not taking: Reported on 9/11/2023)      aspirin 81 MG EC tablet Take 81 mg by mouth every day.       No current facility-administered medications for this visit.    \"CURRENT RX\"    ALLERGIES: Morphine, Erythromycin, Hydrocodone-acetaminophen, Mastisol adhesive [mastisol], Meperidine hcl, and Other drug    ROS    Constitutional: Denies fever, chills, sweats,  weight loss, fatigue  Cardiovascular: Denies chest pain, tightness, " "palpitations, swelling in legs/feet, fainting, difficulty breathing when lying down but gets better when sitting up.   Respiratory: Denies shortness of breath, cough, sputum, wheezing, painful breathing, coughing up blood.   Sleep: per HPI  Gastrointestinal: Denies  difficulty swallowing, nausea, abdominal pain, diarrhea, constipation, heartburn.  Musculoskeletal: Denies painful joints, sore muscles, back pain.        PHYSICAL EXAM      /72 (BP Location: Left arm, Patient Position: Sitting, BP Cuff Size: Adult)   Pulse 62   Resp 14   Ht 1.6 m (5' 3\")   Wt 81.6 kg (180 lb)   SpO2 93%   BMI 31.89 kg/m²   Appearance: Well-nourished, well-developed, no acute distress  Eyes:  PERRLA, EOMI  ENMT: without change  Neck: Supple, trachea midline, no masses  Respiratory effort:  No intercostal retractions or use of accessory muscles  Lung auscultation:  No wheezes rhonchi rubs or rales  Cardiac: No murmurs, rubs, or gallops; regular rhythm, normal rate; no edema  Abdomen:  No tenderness, no organomegaly.  Musculoskeletal:  Grossly normal; gait and station normal; digits and nails normal  Skin:  No rashes, petechiae, cyanosis  Neurologic: without focal signs; oriented to person, time, place, and purpose; judgement intact  Psychiatric:  No depression, anxiety, agitation      Medical Decision Making       The medical record was reviewed in its entirety including the referral notes, records from primary care, consultants notes, hospital records, lab, imaging, microbiology, immunology, and immunizations.  Care gaps identified and reviewed with the patient.    Diagnostic and titration nocturnal polysomnogram's, home sleep apnea tests, continuous nocturnal oximetry results, multiple sleep latency tests, and compliance reports reviewed.  1. LIZETH (obstructive sleep apnea)  - DME BiPAP      PLAN:     The patient's PSG was performed on 11/5/2023 and showed moderate LIZETH with an AHI of 17.5, a supine AHI of 23.4, a david " saturation of 76%, and saturations less than 88% for 267.4 minutes.  The CPAP and BiPAP titration were somewhat inconsistent.  The patient did normalize the apnea-hypopnea index on several CPAP pressures and bilevel pressures.    Will order auto titrating BiPAP with a minimum EPAP of 12 CWP and a maximum IPAP of 24 CWP and pressure support 4 CWP using a mask of choice and heated humidification followed by clinical and compliance review 31 to 90 days after she receives her equipment.  She will also require O2 bleed-in at 2 LPM as saturations remained low on all tested pressures.    The risks of untreated sleep apnea were discussed with the patient at length. Patients with LIZETH are at increased risk of cardiovascular disease including systemic arterial hypertension, pulmonary arterial hypertension (transient or fixed), TIA, and an elevated risk of stroke, heart attack, sudden death, or arrhythmia between the hours of 11 PM and 6 AM. LIZETH patients have an increased risk of motor vehicle accidents, type 2 diabetes, GERD, repetitive mechanical trauma to pharyngeal muscles with inflammation and denervation, frequent EEG arousals leading to nonrestorative sleep, excessive daytime sleepiness, fatigue, depression, poor pain control, irritability, and lower quality of life.  The patient was advised to avoid driving a motor vehicle when drowsy.    Positive airway pressure will favorably impact many of the adverse conditions and effects provoked by LIZETH.    Have advised the patient to follow up with the appropriate healthcare practitioners for all other medical problems and issues.    Return in about 10 weeks (around 3/25/2024).    Total time 30 minutes

## 2024-01-15 ENCOUNTER — OFFICE VISIT (OUTPATIENT)
Dept: SLEEP MEDICINE | Facility: MEDICAL CENTER | Age: 79
End: 2024-01-15
Attending: INTERNAL MEDICINE
Payer: MEDICARE

## 2024-01-15 VITALS
DIASTOLIC BLOOD PRESSURE: 72 MMHG | BODY MASS INDEX: 31.89 KG/M2 | OXYGEN SATURATION: 93 % | WEIGHT: 180 LBS | HEIGHT: 63 IN | SYSTOLIC BLOOD PRESSURE: 122 MMHG | HEART RATE: 62 BPM | RESPIRATION RATE: 14 BRPM

## 2024-01-15 DIAGNOSIS — G47.33 OSA (OBSTRUCTIVE SLEEP APNEA): ICD-10-CM

## 2024-01-15 PROCEDURE — 3078F DIAST BP <80 MM HG: CPT | Performed by: INTERNAL MEDICINE

## 2024-01-15 PROCEDURE — 99212 OFFICE O/P EST SF 10 MIN: CPT | Performed by: INTERNAL MEDICINE

## 2024-01-15 PROCEDURE — 99214 OFFICE O/P EST MOD 30 MIN: CPT | Performed by: INTERNAL MEDICINE

## 2024-01-15 PROCEDURE — 3074F SYST BP LT 130 MM HG: CPT | Performed by: INTERNAL MEDICINE

## 2024-01-15 ASSESSMENT — PATIENT HEALTH QUESTIONNAIRE - PHQ9: CLINICAL INTERPRETATION OF PHQ2 SCORE: 0

## 2024-01-15 ASSESSMENT — FIBROSIS 4 INDEX: FIB4 SCORE: 2.54

## 2024-02-09 ENCOUNTER — TELEPHONE (OUTPATIENT)
Dept: MEDICAL GROUP | Facility: LAB | Age: 79
End: 2024-02-09
Payer: MEDICARE

## 2024-02-09 RX ORDER — TRAZODONE HYDROCHLORIDE 50 MG/1
50 TABLET ORAL NIGHTLY
Qty: 30 TABLET | Refills: 3 | Status: SHIPPED | OUTPATIENT
Start: 2024-02-09 | End: 2024-02-09 | Stop reason: SDUPTHER

## 2024-02-09 RX ORDER — SERTRALINE HYDROCHLORIDE 25 MG/1
25 TABLET, FILM COATED ORAL DAILY
Qty: 90 TABLET | Refills: 3 | Status: SHIPPED | OUTPATIENT
Start: 2024-02-09 | End: 2024-02-09 | Stop reason: SDUPTHER

## 2024-02-09 NOTE — TELEPHONE ENCOUNTER
Trazodone is been called in.  The highest dose of Zoloft tablets is 100 mg.  We can still increase it we will just call in a 25 mg tablet to be taken with the 100 mg tablet of Zoloft.

## 2024-02-09 NOTE — TELEPHONE ENCOUNTER
Patient called and LVM inquiring if she can get a sleeping aid to help her through out the night, She has been speaking with her therapists who stated might be a good idea.   She would also like to increase her Zoloft as well, Please advise if okay currently taking 100 mg.

## 2024-02-12 RX ORDER — TRAZODONE HYDROCHLORIDE 50 MG/1
TABLET ORAL
Qty: 90 TABLET | Refills: 3 | Status: SHIPPED | OUTPATIENT
Start: 2024-02-12

## 2024-02-12 RX ORDER — SERTRALINE HYDROCHLORIDE 25 MG/1
25 TABLET, FILM COATED ORAL DAILY
Qty: 90 TABLET | Refills: 3 | Status: SHIPPED | OUTPATIENT
Start: 2024-02-12

## 2024-02-12 NOTE — TELEPHONE ENCOUNTER
Called spoke to patient Friday evening, notified, will try 5 mg of trazodone, appointment made to discuss something better, Notified med's are pending to [facility] pharmacy for patient

## 2024-02-21 ENCOUNTER — OFFICE VISIT (OUTPATIENT)
Dept: MEDICAL GROUP | Facility: LAB | Age: 79
End: 2024-02-21
Payer: MEDICARE

## 2024-02-21 VITALS
TEMPERATURE: 97 F | SYSTOLIC BLOOD PRESSURE: 114 MMHG | BODY MASS INDEX: 32.07 KG/M2 | OXYGEN SATURATION: 94 % | DIASTOLIC BLOOD PRESSURE: 58 MMHG | WEIGHT: 181 LBS | HEIGHT: 63 IN | HEART RATE: 56 BPM

## 2024-02-21 DIAGNOSIS — F51.01 PRIMARY INSOMNIA: ICD-10-CM

## 2024-02-21 DIAGNOSIS — M19.042 PRIMARY OSTEOARTHRITIS OF BOTH HANDS: ICD-10-CM

## 2024-02-21 DIAGNOSIS — F33.0 MILD EPISODE OF RECURRENT MAJOR DEPRESSIVE DISORDER (HCC): ICD-10-CM

## 2024-02-21 DIAGNOSIS — D80.1 HYPOGAMMAGLOBULINEMIA (HCC): ICD-10-CM

## 2024-02-21 DIAGNOSIS — I47.10 SUPRAVENTRICULAR TACHYCARDIA, PAROXYSMAL (HCC): ICD-10-CM

## 2024-02-21 DIAGNOSIS — M19.041 PRIMARY OSTEOARTHRITIS OF BOTH HANDS: ICD-10-CM

## 2024-02-21 DIAGNOSIS — G47.33 OSA (OBSTRUCTIVE SLEEP APNEA): ICD-10-CM

## 2024-02-21 PROCEDURE — 99214 OFFICE O/P EST MOD 30 MIN: CPT | Performed by: FAMILY MEDICINE

## 2024-02-21 PROCEDURE — 3078F DIAST BP <80 MM HG: CPT | Performed by: FAMILY MEDICINE

## 2024-02-21 PROCEDURE — 3074F SYST BP LT 130 MM HG: CPT | Performed by: FAMILY MEDICINE

## 2024-02-21 ASSESSMENT — FIBROSIS 4 INDEX: FIB4 SCORE: 2.54

## 2024-02-21 NOTE — PROGRESS NOTES
Verbal consent was acquired by the patient to use [employer] ambient listening note generation during this visit Yes    Subjective:   Jackie GEE is a 78 y.o. female here today for   Chief Complaint   Patient presents with    Follow-Up     For sleep, working well      History of Present Illness  The patient is a 78-year-old female here today to follow up on insomnia. She has been treating recently with trazodone 25 to 50 mg at night. She is also taking sertraline 125 mg daily as well. She has had significant increase of stress leading to further insomnia given recent decline in health of her  who has recently been placed in memory care for dementia.    Patient states that depression is stable.  She has decreased sertraline from 125 mg to 100 mg given side effects of lethargy and somnolence.  She states that she continues to have difficulties especially with her  in memory care but she is handling the stressors appropriately.  Denies any suicidal/homicidal ideations, loose motions, delusions, paranoia.    Her sleep is better. She is taking melatonin 5 mg and trazodone 25 mg. She tried it a couple of nights without the extra boost of melatonin and it was okay. She is now getting over 7 hours of sleep most every night. Her mood is good. She is getting her BiPAP next week. She is wondering if her BiPAP is out of her network because they are charging her 20 percent of everything. She had a sleep study done in 10/2023 showing severe sleep apnea. She followed up with pulmonary medicine at the beginning of the year. She is not getting enough oxygen when she sleeps.    She has arthritis in both of her thumbs. They are just aching. She has been doing some hand exercises and it is better. She takes turmeric. She put Icy  yesterday and it made her pain worse. She put an ice pack and it cooled off. She is on the phone a lot and doing different things. Her thumbs are hurting when she wakes up in the  "morning.  Denies any numbness, tingling, weakness in hands.      Allergies   Allergen Reactions    Morphine      Lowers heart rate    Erythromycin Nausea    Hydrocodone-Acetaminophen     Mastisol Adhesive [Mastisol] Rash    Meperidine Hcl Unspecified     [08/08/09 UNKNOWN, PLEASE VERIFY]    Other Drug      \"All narcotics make me nauseated\"         Current medicines (including changes today)  Current Outpatient Medications   Medication Sig Dispense Refill    sertraline (ZOLOFT) 25 MG tablet Take 1 Tablet by mouth every day. 90 Tablet 3    traZODone (DESYREL) 50 MG Tab Cut in half and take one half by mouth before bed take the second half if not able to sleep 1 hour after first dose. 90 Tablet 3    omeprazole (PRILOSEC) 20 MG delayed-release capsule Take 2 Capsules by mouth 2 times a day. 200 Capsule 2    simvastatin (ZOCOR) 40 MG Tab Take 1 Tablet by mouth every evening. 100 Tablet 3    sertraline (ZOLOFT) 100 MG Tab Take 1 Tablet by mouth every day for 360 days. 90 Tablet 3    atenolol (TENORMIN) 50 MG Tab Take 1 Tablet by mouth every day for 360 days. 100 Tablet 3    ezetimibe (ZETIA) 10 MG Tab Take 1 Tablet by mouth every day for 360 days. 90 Tablet 3    fluticasone (FLOVENT HFA) 110 MCG/ACT Aerosol Inhale 1 Puff 2 times a day. 12 g 1    albuterol 108 (90 Base) MCG/ACT Aero Soln inhalation aerosol Inhale 1 to 2 puffs by mouth every 4 to 6 hours as needed for wheezing and cough 8.5 g 2     No current facility-administered medications for this visit.     She  has a past medical history of Allergy, Anxiety, Arthritis, Asthma, Back pain, Back pain, Bronchitis, Cardiac arrhythmia, Chest pressure (07/11/2022), Chickenpox, Constipation, Cough, Depression, Dizziness, Dysrhythmia, Fatigue, GERD (gastroesophageal reflux disease), Hair loss (10/27/2021), History of nonmelanoma skin cancer (03/21/2018), Hypertension, Mumps, Nasal drainage, Obesity, Painful joint, Ringing in ears, Tonsillitis, and Vaginal Papanicolaou smear " "not required due to history of hysterectomy (02/28/2013).    ROS   ROS  -See HPI     Objective:     Physical Exam:  /58 (BP Location: Left arm, Patient Position: Sitting)   Pulse (!) 56   Temp 36.1 °C (97 °F) (Temporal)   Ht 1.6 m (5' 3\")   Wt 82.1 kg (181 lb)   SpO2 94%  Body mass index is 32.06 kg/m².   Constitutional: Alert, no distress, well-groomed.  Skin: No rashes in visible areas.  Eye: Round. Conjunctiva clear, lids normal. No icterus.   ENMT: Lips pink without lesions, good dentition, moist mucous membranes. Phonation normal.  Neck: No masses, no thyromegaly. Moves freely without pain.  Respiratory: Unlabored respiratory effort, no cough or audible wheeze  Psych: Alert and oriented x3, normal affect and mood.    Results      Assessment and Plan:     Assessment & Plan  1. Insomnia.  Status: Stable.  Will continue with the trazodone 25 mg as well as melatonin.    2. Sleep apnea.  -Reviewed sleep study with patient showing severe sleep apnea with recommendations for BiPAP therapy.  Patient is concerned regarding the cost of BiPAP through [employer].  Patient is wonder if there is a cheaper alternative and referred patient back to sleep medicine to discuss referral to another DME provider.    3. Depression.  Stable.  Her mood is good. She will continue sertraline 100 mg.    4. Arthritis.  She has arthritis in both of her thumbs. She will continue Tylenol.  I also recommend at this time Voltaren gel.  Discussed appropriate, potential side effects.    Follow-up  6 months, sooner if needed.    Orders:  1. Primary insomnia    2. Mild episode of recurrent major depressive disorder (HCC)    3. LIZETH (obstructive sleep apnea)    4. 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 Continue with current defined treatment plan: Will continue monitor for symptoms.  Continue to check labs regularly.. Follow-up at " least annually.  Diagnosis: F33.0 - Mild episode of recurrent major depressive disorder (HCC)  Assessment and plan: Chronic, stable. Continue with current defined treatment plan: Due to sertraline 100 mg daily.. Follow-up at least annually.  Diagnosis: I47.10 - Supraventricular tachycardia, paroxysmal  Assessment and plan: Chronic, stable. Continue with current defined treatment plan: Continue with atenolol as needed.. Follow-up at least annually.  Last edited 02/21/24 10:43 PST by Saeid Alvarado M.D.           Followup: No follow-ups on file.         PLEASE NOTE: This dictation was created using voice recognition and [employer] ambient listening software. I have made every reasonable attempt to correct obvious errors, but I expect that there are errors of grammar and possibly content that I did not discover before finalizing the note.

## 2024-03-18 ENCOUNTER — OFFICE VISIT (OUTPATIENT)
Dept: MEDICAL GROUP | Facility: LAB | Age: 79
End: 2024-03-18
Payer: MEDICARE

## 2024-03-18 ENCOUNTER — TELEPHONE (OUTPATIENT)
Dept: MEDICAL GROUP | Facility: LAB | Age: 79
End: 2024-03-18

## 2024-03-18 VITALS
TEMPERATURE: 97.5 F | DIASTOLIC BLOOD PRESSURE: 70 MMHG | BODY MASS INDEX: 32.25 KG/M2 | HEIGHT: 63 IN | RESPIRATION RATE: 16 BRPM | WEIGHT: 182 LBS | OXYGEN SATURATION: 96 % | HEART RATE: 76 BPM | SYSTOLIC BLOOD PRESSURE: 112 MMHG

## 2024-03-18 DIAGNOSIS — G47.33 OSA (OBSTRUCTIVE SLEEP APNEA): ICD-10-CM

## 2024-03-18 DIAGNOSIS — H81.13 BENIGN PAROXYSMAL POSITIONAL VERTIGO DUE TO BILATERAL VESTIBULAR DISORDER: ICD-10-CM

## 2024-03-18 DIAGNOSIS — M25.551 RIGHT HIP PAIN: ICD-10-CM

## 2024-03-18 PROCEDURE — 3078F DIAST BP <80 MM HG: CPT | Performed by: FAMILY MEDICINE

## 2024-03-18 PROCEDURE — 99214 OFFICE O/P EST MOD 30 MIN: CPT | Performed by: FAMILY MEDICINE

## 2024-03-18 PROCEDURE — 3074F SYST BP LT 130 MM HG: CPT | Performed by: FAMILY MEDICINE

## 2024-03-18 ASSESSMENT — ENCOUNTER SYMPTOMS
PALPITATIONS: 0
DIZZINESS: 1

## 2024-03-18 ASSESSMENT — FIBROSIS 4 INDEX: FIB4 SCORE: 2.54

## 2024-03-18 NOTE — TELEPHONE ENCOUNTER
Received a voicemail form patient, Inquiring about her sleep apnea, she is using the machine however she is noticing light headed, dizziness after using her Cpap, Believes it is from the headgear, she doesn't believe it is even helping, Should she return it? Please advise.
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
## 2024-03-18 NOTE — TELEPHONE ENCOUNTER
Please have her follow-up with the company that provided the CPAP as they may have other options for masks that do not cause symptoms.

## 2024-03-19 ENCOUNTER — HOSPITAL ENCOUNTER (OUTPATIENT)
Dept: RADIOLOGY | Facility: MEDICAL CENTER | Age: 79
End: 2024-03-19
Attending: FAMILY MEDICINE
Payer: MEDICARE

## 2024-03-19 DIAGNOSIS — M25.551 RIGHT HIP PAIN: ICD-10-CM

## 2024-03-19 PROCEDURE — RXMED WILLOW AMBULATORY MEDICATION CHARGE: Performed by: FAMILY MEDICINE

## 2024-03-19 PROCEDURE — 73522 X-RAY EXAM HIPS BI 3-4 VIEWS: CPT

## 2024-03-20 ENCOUNTER — PHARMACY VISIT (OUTPATIENT)
Dept: PHARMACY | Facility: MEDICAL CENTER | Age: 79
End: 2024-03-20
Payer: COMMERCIAL

## 2024-03-20 ENCOUNTER — OFFICE VISIT (OUTPATIENT)
Dept: SLEEP MEDICINE | Facility: MEDICAL CENTER | Age: 79
End: 2024-03-20
Attending: STUDENT IN AN ORGANIZED HEALTH CARE EDUCATION/TRAINING PROGRAM
Payer: MEDICARE

## 2024-03-20 VITALS
RESPIRATION RATE: 16 BRPM | SYSTOLIC BLOOD PRESSURE: 122 MMHG | HEIGHT: 64 IN | OXYGEN SATURATION: 94 % | HEART RATE: 68 BPM | WEIGHT: 180 LBS | BODY MASS INDEX: 30.73 KG/M2 | DIASTOLIC BLOOD PRESSURE: 60 MMHG

## 2024-03-20 DIAGNOSIS — G47.33 OSA (OBSTRUCTIVE SLEEP APNEA): Primary | ICD-10-CM

## 2024-03-20 DIAGNOSIS — G47.34 NOCTURNAL HYPOXIA: ICD-10-CM

## 2024-03-20 PROCEDURE — 3074F SYST BP LT 130 MM HG: CPT | Performed by: STUDENT IN AN ORGANIZED HEALTH CARE EDUCATION/TRAINING PROGRAM

## 2024-03-20 PROCEDURE — 3078F DIAST BP <80 MM HG: CPT | Performed by: STUDENT IN AN ORGANIZED HEALTH CARE EDUCATION/TRAINING PROGRAM

## 2024-03-20 PROCEDURE — 99213 OFFICE O/P EST LOW 20 MIN: CPT | Performed by: STUDENT IN AN ORGANIZED HEALTH CARE EDUCATION/TRAINING PROGRAM

## 2024-03-20 PROCEDURE — 99212 OFFICE O/P EST SF 10 MIN: CPT | Performed by: STUDENT IN AN ORGANIZED HEALTH CARE EDUCATION/TRAINING PROGRAM

## 2024-03-20 ASSESSMENT — FIBROSIS 4 INDEX: FIB4 SCORE: 2.54

## 2024-03-20 NOTE — PROGRESS NOTES
[facility] Follow-up Visit    CC: Follow-up regarding management of obstructive sleep apnea and nocturnal hypoxia      HPI:  Jackie Johnson is a 78 y.o.female  with GERD, MDD, SVT, obesity, atherosclerosis, and severe obstructive sleep apnea nocturnal hypoxia.  Presents today to follow-up regarding management of obstructive sleep apnea nocturnal hypoxia.    Since last visit with our office she has received her BiPAP machine with supplemental oxygen.  She states she is having difficulty using her BiPAP machine nightly.  She finds that the mask will leak at times and the pressure becomes too great.  She is working with her DME company on masks.  She did change up to a different hybrid style fullface mask that can accept front versus on top.  She found that the top connecting mask fell down in the night which made it difficult to keep a seal.  She is interested in trying a nasal cushion mask.  She would also be open to trying a nasal cushion with a chinstrap.    She is making sure to use her BiPAP machine with oxygen.  She is somewhat frustrated with her mask and pressures.  She is hopeful that with changing her mask and pressures that she may be able to use her machine more regularly.    DME provider: [employer]   Device: Aircurve 10   Mask: F30  Aerophagia: No   Snoring: No   Dry mouth: Yes  Leak: Yes  Skin irritation: No   Chin strap: No     Sleep History  10/4/2023 HST WatchPAT study showed a 4% AHI of 44.7 events an hour, minimum oxygen saturation of 71%, time out of below 88% saturation of 111 minutes.  11/5/2023 PSG titration study showed patient did have improvement in respiratory events with CPAP and BiPAP.  He was recommended she try auto BiPAP EPAP 16 IPAP 22 pressure support 4 with bleed and supplemental oxygen 2 L/min    Patient Active Problem List    Diagnosis Date Noted    LIZETH (obstructive sleep apnea) 02/21/2024    Primary osteoarthritis of both hands 02/21/2024    Obesity (BMI 30-39.9)  10/06/2023    Nonspecific abnormal results of function study, cardiovascular 10/06/2023    Atherosclerosis 10/06/2023    Nausea & vomiting 09/08/2023    Diarrhea 09/08/2023    BMI 32.0-32.9,adult 09/08/2023    Irritable bowel syndrome 07/14/2023    Asthma 07/11/2022    Essential hypertension 01/31/2020    Hyperlipidemia 05/15/2018    Hypogammaglobulinemia (HCC) 08/01/2017    Major depressive disorder, recurrent episode (HCC) 09/30/2014    Bilateral primary osteoarthritis of knee 03/13/2008    Supraventricular tachycardia, paroxysmal 10/31/2007    GERD (gastroesophageal reflux disease) 10/31/2007       Past Medical History:   Diagnosis Date    Allergy     Anxiety     Arthritis     Asthma     Back pain     Back pain     Bronchitis     Cardiac arrhythmia     Chest pressure 07/11/2022    Chickenpox     Constipation     Cough     Depression     Dizziness     Dysrhythmia     Fatigue     GERD (gastroesophageal reflux disease)     Hair loss 10/27/2021    History of nonmelanoma skin cancer 03/21/2018    Formatting of this note might be different from the original. 2016 right upper arm squamous cell carcinoma status post edc.  Plan: yearly dermatology exam    Hypertension     Mumps     Nasal drainage     Obesity     Painful joint     Ringing in ears     Tonsillitis     Vaginal Papanicolaou smear not required due to history of hysterectomy 02/28/2013    Formatting of this note might be different from the original. Cervix is surgically absent        Past Surgical History:   Procedure Laterality Date    ABDOMINAL HYSTERECTOMY TOTAL      ARTHROPLASTY      ARTHROSCOPY, KNEE      CARPAL TUNNEL RELEASE      EYE SURGERY      OTHER ORTHOPEDIC SURGERY Bilateral     knee replacements    IL REMV 2ND CATARACT,CORN-SCLER SECTN      TONSILLECTOMY      TUBAL COAGULATION LAPAROSCOPIC BILATERAL         Family History   Problem Relation Age of Onset    Cancer Father         Prostate    Diabetes Father     Heart Disease Father     Prostate  cancer Father     Colorectal Cancer Maternal Grandfather     Glaucoma Maternal Grandfather     Colon Cancer Maternal Grandfather     Heart Disease Maternal Grandmother        Social History     Socioeconomic History    Marital status:      Spouse name: Not on file    Number of children: Not on file    Years of education: Not on file    Highest education level: 12th grade   Occupational History    Not on file   Tobacco Use    Smoking status: Former     Current packs/day: 0.00     Average packs/day: 1 pack/day for 56.0 years (56.0 ttl pk-yrs)     Types: Cigarettes     Start date: 10/5/1964     Quit date: 10/10/1985     Years since quittin.4    Smokeless tobacco: Never   Vaping Use    Vaping Use: Never used   Substance and Sexual Activity    Alcohol use: Yes     Alcohol/week: 4.2 oz     Types: 7 Glasses of wine per week     Comment: occ    Drug use: No    Sexual activity: Yes     Partners: Male     Birth control/protection: Female Sterilization, Post-Menopausal   Other Topics Concern    Not on file   Social History Narrative    Not on file     Social Determinants of Health     Financial Resource Strain: Patient Declined (3/7/2023)    Overall Financial Resource Strain (CARDIA)     Difficulty of Paying Living Expenses: Patient declined   Food Insecurity: No Food Insecurity (3/7/2023)    Hunger Vital Sign     Worried About Running Out of Food in the Last Year: Never true     Ran Out of Food in the Last Year: Never true   Transportation Needs: Unmet Transportation Needs (3/7/2023)    PRAPARE - Transportation     Lack of Transportation (Medical): Yes     Lack of Transportation (Non-Medical): No   Physical Activity: Sufficiently Active (3/7/2023)    Exercise Vital Sign     Days of Exercise per Week: 5 days     Minutes of Exercise per Session: 50 min   Stress: Stress Concern Present (3/7/2023)    [place] [employer] - Occupational Stress Questionnaire     Feeling of Stress : To some extent    Social Connections: Moderately Integrated (3/7/2023)    Social Connection and Isolation Panel [NHANES]     Frequency of Communication with Friends and Family: More than three times a week     Frequency of Social Gatherings with Friends and Family: More than three times a week     Attends Temple Services: Never     Active Member of Clubs or Organizations: Yes     Attends Club or Organization Meetings: 1 to 4 times per year     Marital Status:    Intimate Partner Violence: Not on file   Housing Stability: Low Risk  (3/7/2023)    Housing Stability Vital Sign     Unable to Pay for Housing in the Last Year: No     Number of Places Lived in the Last Year: 1     Unstable Housing in the Last Year: No       Current Outpatient Medications   Medication Sig Dispense Refill    sertraline (ZOLOFT) 25 MG tablet Take 1 Tablet by mouth every day. 90 Tablet 3    traZODone (DESYREL) 50 MG Tab Cut in half and take one half by mouth before bed take the second half if not able to sleep 1 hour after first dose. 90 Tablet 3    omeprazole (PRILOSEC) 20 MG delayed-release capsule Take 2 Capsules by mouth 2 times a day. 200 Capsule 2    simvastatin (ZOCOR) 40 MG Tab Take 1 Tablet by mouth every evening. 100 Tablet 3    sertraline (ZOLOFT) 100 MG Tab Take 1 tablet by mouth every day 90 Tablet 3    atenolol (TENORMIN) 50 MG Tab Take 1 Tablet by mouth every day for 360 days. 100 Tablet 3    ezetimibe (ZETIA) 10 MG Tab Take 1 Tablet by mouth every day for 360 days. 90 Tablet 3    fluticasone (FLOVENT HFA) 110 MCG/ACT Aerosol Inhale 1 Puff 2 times a day. 12 g 1    albuterol 108 (90 Base) MCG/ACT Aero Soln inhalation aerosol Inhale 1 to 2 puffs by mouth every 4 to 6 hours as needed for wheezing and cough 8.5 g 2     No current facility-administered medications for this visit.        ALLERGIES: Morphine, Erythromycin, Hydrocodone-acetaminophen, Mastisol adhesive [mastisol], Meperidine hcl, and Other drug    ROS  Constitutional: Denies  "fevers, Denies weight changes  Ears/Nose/Throat/Mouth: Denies nasal congestion or sore throat   Cardiovascular: Denies chest pain  Respiratory: Denies shortness of breath, Denies cough  Gastrointestinal/Hepatic: Denies nausea, vomiting  Sleep: see HPI      PHYSICAL EXAM  /60 (BP Location: Left arm, Patient Position: Sitting, BP Cuff Size: Adult)   Pulse 68   Resp 16   Ht 1.626 m (5' 4\")   Wt 81.6 kg (180 lb)   SpO2 94%   BMI 30.90 kg/m²   Appearance: Well-nourished, well-developed, no acute distress  Eyes:  No scleral icterus , EOMI  Musculoskeletal:  Grossly normal; gait and station normal; digits and nails normal  Skin:  No rashes, petechiae, cyanosis  Neurologic: without focal signs; oriented to person, time, place, and purpose; judgement intact      Medical Decision Making   Assessment and Plan  Jackie Johnson is a 78 y.o.female  with GERD, MDD, SVT, obesity, atherosclerosis, and severe obstructive sleep apnea nocturnal hypoxia.  Presents today to follow-up regarding management of obstructive sleep apnea nocturnal hypoxia.    The medical record was reviewed.    Obstructive sleep apnea  She has had a machine now for approximately 2 weeks.  She is having some difficulty regarding pressure and mask leak.  Regarding her complaints will lower pressure settings.  Her current pressure is auto BiPAP EPAP 12 IPAP 24 pressure support 4.  New settings will be EPAP 9 IPAP 18 pressure support 4.  In addition discussed differences between ramp and therapy pressures.  Advised that she would benefit from trying a nasal cushion mask such as a N30 and seeing if she prefers this to her fullface mask.  She might also try a chinstrap with a nasal mask to see if this is more comfortable.    Reviewed that given the level of her sleep apnea and nocturnal hypoxia she would benefit from continued use of her BiPAP with supplemental oxygen.    Discussed desensitization during the day.  Advised that she may try on the mask " during the day for 20 to 30 minutes while the pressure is being delivered.  She may also decrease the ramp during this time so she can experience therapeutic pressures.    PLAN:   -Order placed for mask and supplies   -Order placed regarding pressure change  -Advised to reach out via MyChart with questions     Has been advised to continue the current BiPAP with supplemental oxygen, clean equipment frequently, and get new mask and supplies as allowed by insurance and DME. Recommend an earlier appointment, if significant treatment barriers develop.    Patients with LIZETH are at increased risk of cardiovascular disease including coronary artery disease, systemic arterial hypertension, pulmonary arterial hypertension, cardiac arrythmias, and stroke. The patient was advised to avoid driving a motor vehicle when drowsy.    Positive airway pressure will favorably impact many of the adverse conditions and effects provoked by LIZETH.    Have advised the patient to follow up with the appropriate healthcare practitioners for all other medical problems and issues.    Return in about 3 months (around 6/20/2024).      Please note portions of this record was created using voice recognition software. I have made every reasonable attempt to correct obvious errors, but I expect that there are errors of grammar and possibly content I did not discover before finalizing the note.

## 2024-03-22 DIAGNOSIS — M19.91 PRIMARY OSTEOARTHRITIS, UNSPECIFIED SITE: ICD-10-CM

## 2024-03-29 ENCOUNTER — PATIENT MESSAGE (OUTPATIENT)
Dept: MEDICAL GROUP | Facility: LAB | Age: 79
End: 2024-03-29
Payer: MEDICARE

## 2024-03-29 DIAGNOSIS — F33.0 MILD EPISODE OF RECURRENT MAJOR DEPRESSIVE DISORDER (HCC): ICD-10-CM

## 2024-03-29 DIAGNOSIS — M25.551 RIGHT HIP PAIN: ICD-10-CM

## 2024-03-29 DIAGNOSIS — M19.91 PRIMARY OSTEOARTHRITIS, UNSPECIFIED SITE: ICD-10-CM

## 2024-03-29 PROCEDURE — RXMED WILLOW AMBULATORY MEDICATION CHARGE: Performed by: FAMILY MEDICINE

## 2024-03-29 NOTE — TELEPHONE ENCOUNTER
Received request via: Pharmacy    Was the patient seen in the last year in this department? Yes    Does the patient have an active prescription (recently filled or refills available) for medication(s) requested? Yes. A different pharmacy is requesting this.  She uses [facility] now.    Pharmacy Name: [facility]    Does the patient have correction Plus and need 100 day supply (blood pressure, diabetes and cholesterol meds only)? Yes, quantity updated to 100 days

## 2024-04-01 RX ORDER — SERTRALINE HYDROCHLORIDE 100 MG/1
100 TABLET, FILM COATED ORAL DAILY
Qty: 100 TABLET | Refills: 0 | Status: SHIPPED | OUTPATIENT
Start: 2024-04-01 | End: 2025-03-27

## 2024-04-13 PROCEDURE — RXMED WILLOW AMBULATORY MEDICATION CHARGE: Performed by: FAMILY MEDICINE

## 2024-04-16 ENCOUNTER — PHARMACY VISIT (OUTPATIENT)
Dept: PHARMACY | Facility: MEDICAL CENTER | Age: 79
End: 2024-04-16
Payer: COMMERCIAL

## 2024-04-16 PROCEDURE — RXMED WILLOW AMBULATORY MEDICATION CHARGE: Performed by: INTERNAL MEDICINE

## 2024-05-27 PROCEDURE — RXMED WILLOW AMBULATORY MEDICATION CHARGE: Performed by: STUDENT IN AN ORGANIZED HEALTH CARE EDUCATION/TRAINING PROGRAM

## 2024-05-29 ENCOUNTER — PHARMACY VISIT (OUTPATIENT)
Dept: PHARMACY | Facility: MEDICAL CENTER | Age: 79
End: 2024-05-29
Payer: COMMERCIAL

## 2024-06-20 ENCOUNTER — OFFICE VISIT (OUTPATIENT)
Dept: SLEEP MEDICINE | Facility: MEDICAL CENTER | Age: 79
End: 2024-06-20
Attending: STUDENT IN AN ORGANIZED HEALTH CARE EDUCATION/TRAINING PROGRAM
Payer: MEDICARE

## 2024-06-20 VITALS
HEART RATE: 57 BPM | OXYGEN SATURATION: 95 % | BODY MASS INDEX: 30.73 KG/M2 | RESPIRATION RATE: 16 BRPM | SYSTOLIC BLOOD PRESSURE: 112 MMHG | WEIGHT: 180 LBS | HEIGHT: 64 IN | DIASTOLIC BLOOD PRESSURE: 68 MMHG

## 2024-06-20 DIAGNOSIS — G47.33 OSA (OBSTRUCTIVE SLEEP APNEA): Primary | ICD-10-CM

## 2024-06-20 DIAGNOSIS — G47.34 NOCTURNAL HYPOXIA: ICD-10-CM

## 2024-06-20 PROCEDURE — 3074F SYST BP LT 130 MM HG: CPT | Performed by: STUDENT IN AN ORGANIZED HEALTH CARE EDUCATION/TRAINING PROGRAM

## 2024-06-20 PROCEDURE — 99213 OFFICE O/P EST LOW 20 MIN: CPT | Performed by: STUDENT IN AN ORGANIZED HEALTH CARE EDUCATION/TRAINING PROGRAM

## 2024-06-20 PROCEDURE — 3078F DIAST BP <80 MM HG: CPT | Performed by: STUDENT IN AN ORGANIZED HEALTH CARE EDUCATION/TRAINING PROGRAM

## 2024-06-20 ASSESSMENT — FIBROSIS 4 INDEX: FIB4 SCORE: 2.57

## 2024-06-20 NOTE — PROGRESS NOTES
[facility] Follow-up Visit    CC: Follow-up regarding management of obstructive sleep apnea after receiving new BiPAP machine      HPI:  Jackie Johnson is a 79 y.o.female  with SVT, MDD, GERD, obesity, and severe obstructive sleep apnea with nocturnal hypoxia on BiPAP with supplemental oxygen.  Presents today for follow-up.    She is using her BiPAP machine with supplemental oxygen nightly.  She states she even traveled with using her BiPAP machine but left her oxygen concentrator at home.  She is unable to move her oxygen concentrator.  She has tried on different masks now.  She prefers a nasal pillow mask and she is working on getting a better seal.  She continues to have a dry mouth.  She did adjust the humidity level which seemed to improve the dry mouth somewhat.  She has not tried Biotene or XyliMelts.    DME provider: [employer]   Device: Aircurve 10   Mask: nasal pillow  Aerophagia: No   Snoring: No   Dry mouth: Yes still   Leak: improving  Skin irritation: No   Chin strap: No      Sleep History  10/4/2023 HST WatchPAT study showed a 4% AHI of 44.7 events an hour, minimum oxygen saturation of 71%, time out of below 88% saturation of 111 minutes.  11/5/2023 PSG titration study showed patient did have improvement in respiratory events with CPAP and BiPAP.  He was recommended she try auto BiPAP EPAP 16 IPAP 22 pressure support 4 with bleed and supplemental oxygen 2 L/min    Patient Active Problem List    Diagnosis Date Noted    LIZETH (obstructive sleep apnea) 02/21/2024    Primary osteoarthritis of both hands 02/21/2024    Obesity (BMI 30-39.9) 10/06/2023    Nonspecific abnormal results of function study, cardiovascular 10/06/2023    Atherosclerosis 10/06/2023    Nausea & vomiting 09/08/2023    Diarrhea 09/08/2023    BMI 32.0-32.9,adult 09/08/2023    Irritable bowel syndrome 07/14/2023    Asthma 07/11/2022    Essential hypertension 01/31/2020    Hyperlipidemia 05/15/2018    Hypogammaglobulinemia (HCC)  08/01/2017    Major depressive disorder, recurrent episode (HCC) 09/30/2014    Bilateral primary osteoarthritis of knee 03/13/2008    Supraventricular tachycardia, paroxysmal (HCC) 10/31/2007    GERD (gastroesophageal reflux disease) 10/31/2007       Past Medical History:   Diagnosis Date    Allergy     Anxiety     Arthritis     Asthma     Back pain     Back pain     Bronchitis     Cardiac arrhythmia     Chest pressure 07/11/2022    Chickenpox     Constipation     Cough     Depression     Dizziness     Dysrhythmia     Fatigue     GERD (gastroesophageal reflux disease)     Hair loss 10/27/2021    History of nonmelanoma skin cancer 03/21/2018    Formatting of this note might be different from the original. 2016 right upper arm squamous cell carcinoma status post edc.  Plan: yearly dermatology exam    Hypertension     Mumps     Nasal drainage     Obesity     Painful joint     Ringing in ears     Tonsillitis     Vaginal Papanicolaou smear not required due to history of hysterectomy 02/28/2013    Formatting of this note might be different from the original. Cervix is surgically absent        Past Surgical History:   Procedure Laterality Date    ABDOMINAL HYSTERECTOMY TOTAL      ARTHROPLASTY      ARTHROSCOPY, KNEE      CARPAL TUNNEL RELEASE      EYE SURGERY      OTHER ORTHOPEDIC SURGERY Bilateral     knee replacements    VA REMV 2ND CATARACT,CORN-SCLER SECTN      TONSILLECTOMY      TUBAL COAGULATION LAPAROSCOPIC BILATERAL         Family History   Problem Relation Age of Onset    Cancer Father         Prostate    Diabetes Father     Heart Disease Father     Prostate cancer Father     Colorectal Cancer Maternal Grandfather     Glaucoma Maternal Grandfather     Colon Cancer Maternal Grandfather     Heart Disease Maternal Grandmother        Social History     Socioeconomic History    Marital status:      Spouse name: Not on file    Number of children: Not on file    Years of education: Not on file    Highest education  level: 12th grade   Occupational History    Not on file   Tobacco Use    Smoking status: Former     Current packs/day: 0.00     Average packs/day: 1 pack/day for 56.0 years (56.0 ttl pk-yrs)     Types: Cigarettes     Start date: 10/5/1964     Quit date: 10/10/1985     Years since quittin.7    Smokeless tobacco: Never   Vaping Use    Vaping status: Never Used   Substance and Sexual Activity    Alcohol use: Yes     Alcohol/week: 4.2 oz     Types: 7 Glasses of wine per week     Comment: occ    Drug use: No    Sexual activity: Yes     Partners: Male     Birth control/protection: Female Sterilization, Post-Menopausal   Other Topics Concern    Not on file   Social History Narrative    Not on file     Social Determinants of Health     Financial Resource Strain: Patient Declined (3/7/2023)    Overall Financial Resource Strain (CARDIA)     Difficulty of Paying Living Expenses: Patient declined   Food Insecurity: No Food Insecurity (3/7/2023)    Hunger Vital Sign     Worried About Running Out of Food in the Last Year: Never true     Ran Out of Food in the Last Year: Never true   Transportation Needs: Unmet Transportation Needs (3/7/2023)    PRAPARE - Transportation     Lack of Transportation (Medical): Yes     Lack of Transportation (Non-Medical): No   Physical Activity: Sufficiently Active (3/7/2023)    Exercise Vital Sign     Days of Exercise per Week: 5 days     Minutes of Exercise per Session: 50 min   Stress: Stress Concern Present (3/7/2023)    [employer] [place] [employer] - Occupational Stress Questionnaire     Feeling of Stress : To some extent   Social Connections: Moderately Integrated (3/7/2023)    Social Connection and Isolation Panel [NHANES]     Frequency of Communication with Friends and Family: More than three times a week     Frequency of Social Gatherings with Friends and Family: More than three times a week     Attends [employer]ology Services: Never     Active Member of Clubs or Organizations:  Yes     Attends Club or Organization Meetings: 1 to 4 times per year     Marital Status:    Intimate Partner Violence: Not on file   Housing Stability: Low Risk  (3/7/2023)    Housing Stability Vital Sign     Unable to Pay for Housing in the Last Year: No     Number of Places Lived in the Last Year: 1     Unstable Housing in the Last Year: No       Current Outpatient Medications   Medication Sig Dispense Refill    sertraline (ZOLOFT) 100 MG Tab Take 1 tablet by mouth every day 100 Tablet 0    sertraline (ZOLOFT) 25 MG tablet Take 1 Tablet by mouth every day. 90 Tablet 3    traZODone (DESYREL) 50 MG Tab Cut in half and take one half by mouth before bed take the second half if not able to sleep 1 hour after first dose. 90 Tablet 3    omeprazole (PRILOSEC) 20 MG delayed-release capsule Take 2 Capsules by mouth 2 times a day. 200 Capsule 2    simvastatin (ZOCOR) 40 MG Tab Take 1 Tablet by mouth every evening. 100 Tablet 3    atenolol (TENORMIN) 50 MG Tab Take 1 Tablet by mouth every day for 360 days. 100 Tablet 3    ezetimibe (ZETIA) 10 MG Tab Take 1 Tablet by mouth every day for 360 days. 90 Tablet 3    fluticasone (FLOVENT HFA) 110 MCG/ACT Aerosol Inhale 1 Puff 2 times a day. 12 g 1    albuterol 108 (90 Base) MCG/ACT Aero Soln inhalation aerosol Inhale 1 to 2 puffs by mouth every 4 to 6 hours as needed for wheezing and cough 8.5 g 2     No current facility-administered medications for this visit.        ALLERGIES: Morphine, Erythromycin, Hydrocodone-acetaminophen, Mastisol adhesive [mastisol], Meperidine hcl, and Other drug    ROS  Constitutional: Denies fevers, Denies weight changes  Ears/Nose/Throat/Mouth: Denies nasal congestion or sore throat   Cardiovascular: Denies chest pain  Respiratory: Denies shortness of breath, Denies cough  Gastrointestinal/Hepatic: Denies nausea, vomiting  Sleep: see HPI      PHYSICAL EXAM  /68 (BP Location: Left arm, Patient Position: Sitting, BP Cuff Size: Adult)   Pulse  "(!) 57   Resp 16   Ht 1.626 m (5' 4\")   Wt 81.6 kg (180 lb)   SpO2 95%   BMI 30.90 kg/m²   Appearance: Well-nourished, well-developed, no acute distress  Eyes:  No scleral icterus , EOMI  Musculoskeletal:  Grossly normal; gait and station normal; digits and nails normal  Skin:  No rashes, petechiae, cyanosis  Neurologic: without focal signs; oriented to person, time, place, and purpose; judgement intact      Medical Decision Making   Assessment and Plan  Jackie Johnosn is a 79 y.o.female  with SVT, MDD, GERD, obesity, and severe obstructive sleep apnea with nocturnal hypoxia on BiPAP with supplemental oxygen.  Presents today for follow-up.    The medical record was reviewed.    Obstructive sleep apnea  Compliance data reviewed showing 87% usage > 4hours in last 30  days. Average AHI 2.5 events/hour. Pt continues to use and benefit from machine.      Current Settings Auto BiPAP EPAP 9 IPAP 18 PS 4  Reviewed BiPAP therapy and oxygen therapy.  Answered patient's questions regarding PAP therapy.    PLAN:   -Order placed for mask and supplies   -Advised to reach out via MyChart with questions     Has been advised to continue the current BPAP with supplemental oxygen, clean equipment frequently, and get new mask and supplies as allowed by insurance and DME. Recommend an earlier appointment, if significant treatment barriers develop.    Patients with LIZETH are at increased risk of cardiovascular disease including coronary artery disease, systemic arterial hypertension, pulmonary arterial hypertension, cardiac arrythmias, and stroke. The patient was advised to avoid driving a motor vehicle when drowsy.    Positive airway pressure will favorably impact many of the adverse conditions and effects provoked by LIZETH.    Have advised the patient to follow up with the appropriate healthcare practitioners for all other medical problems and issues.    Return in about 6 months (around 12/20/2024).      Please note portions of " this record was created using voice recognition software. I have made every reasonable attempt to correct obvious errors, but I expect that there are errors of grammar and possibly content I did not discover before finalizing the note.

## 2024-06-25 NOTE — OP THERAPY EVALUATION
"  Outpatient Physical Therapy  INITIAL EVALUATION    [facility] Therapy  [address]  [place] [address]  Phone:  [phone]  Fax:  [phone]    Date of Evaluation: 06/26/2024    Patient: Melania Johnson  YOB: 1945  MRN: [id number]     Referring Provider: Saeid Alvarado M.D.  [address] [facility]  [address]  [place] [address]   Referring Diagnosis Pain in right hip [M25.551]     Time Calculation  Start time: 1100  Stop time: 1145 Time Calculation (min): 45 minutes         Chief Complaint: Hip Problem    Visit Diagnoses     ICD-10-CM   1. Osteoarthritis of right hip, unspecified osteoarthritis type  M16.11       Date of onset of impairment: No data found    Subjective:   History of Present Illness:     Mechanism of injury:  Pt is a 79 y.o female presenting to physical therapy with complaints of R hip pain. Pt has a PMH of anxiety, asthma, back pain, arrhythmia, depression, fatigue, GERD, HTN, and obesity. Pt reports that she has hip and lower back pain. Notes that when she first starts walking, her hips are extremely painful, notes that after she continues to move it goes away. Notes the pain is worse in the morning. Does not workout often. Notes that she does get relief after about 10 minutes of walking and after waking. Notes that she does have a lot of stress due to family circumstances with  being placed in memory care. Notes that at the end of her busy day that she is fairly sedentary.     Per MD on 03/18/2024:\"She has had pain in her right leg for a couple of weeks. It does not happen during the day. She wakes up in the night with severe pain and if she rubs it, it gets better. When she gets up every morning, it is hurting so bad that she can not stand that anymore. When she gets up, she gets dizzy. Last night, she turned over when it started hurting and she slept on her left side for a while. When she woke up, her " "right side was very painful. She gets up and uses an ice pack for her neck and a hot pack for her leg and hip. She takes Tylenol or Advil. As far as her leg is concerned, she is fine throughout the day. Her left hip was hurting for years, but now it is her right hip.\"    Pt denies changes in bowel/bladder movements, saddle anesthesia, significant weight changes, chills/night sweats/fever, nausea and vomiting. Pt consents to evaluation and treatment today.    Pain:     Current pain ratin    At best pain ratin    At worst pain ratin    Location:  R hip and low back    Pain Comments::  Quality: stiff     Aggravating Factors: walking,     Relieving Factors:  advil (as needed), loosening up with movement, sti  Social Support:     Lives in:  Apartment (20 steps up the stairs)  Diagnostic Tests:     Diagnostic Tests Comments:  IMPRESSION:        Moderate osteoarthritis of the bilateral hip joints.    Activities of Daily Living:     Patient reported ADL status: Pt reported ADLs: IND   Work: retired  Exercise: walking, has a gym at her apartment complex (20-30 minutes on TM),   Hobbies: walking (has limited this due to pain and her current state of level of activity)    Patient Goals:     Patient goals for therapy:  Increased motion, decreased pain and increased strength    Other patient goals:  Walking longer distance      Past Medical History:   Diagnosis Date    Allergy     Anxiety     Arthritis     Asthma     Back pain     Back pain     Bronchitis     Cardiac arrhythmia     Chest pressure 2022    Chickenpox     Constipation     Cough     Depression     Dizziness     Dysrhythmia     Fatigue     GERD (gastroesophageal reflux disease)     Hair loss 10/27/2021    History of nonmelanoma skin cancer 2018    Formatting of this note might be different from the original. 2016 right upper arm squamous cell carcinoma status post edc.  Plan: yearly dermatology exam    Hypertension     Mumps     Nasal " drainage     Obesity     Painful joint     Ringing in ears     Tonsillitis     Vaginal Papanicolaou smear not required due to history of hysterectomy 2013    Formatting of this note might be different from the original. Cervix is surgically absent     Past Surgical History:   Procedure Laterality Date    ABDOMINAL HYSTERECTOMY TOTAL      ARTHROPLASTY      ARTHROSCOPY, KNEE      CARPAL TUNNEL RELEASE      EYE SURGERY      OTHER ORTHOPEDIC SURGERY Bilateral     knee replacements    [place] REMV 2ND CATARACT,CORN-SCLER SECTN      TONSILLECTOMY      TUBAL COAGULATION LAPAROSCOPIC BILATERAL       Social History     Tobacco Use    Smoking status: Former     Current packs/day: 0.00     Average packs/day: 1 pack/day for 56.0 years (56.0 ttl pk-yrs)     Types: Cigarettes     Start date: 10/5/1964     Quit date: 10/10/1985     Years since quittin.7    Smokeless tobacco: Never   Substance Use Topics    Alcohol use: Yes     Alcohol/week: 4.2 oz     Types: 7 Glasses of wine per week     Comment: occ     Family and Occupational History     Socioeconomic History    Marital status:      Spouse name: Not on file    Number of children: Not on file    Years of education: Not on file    Highest education level: 12th grade   Occupational History    Not on file       Objective     Active Range of Motion     Lumbar   Flexion: decreased  Extension: within functional limits  Left lateral flexion: within functional limits  Right lateral flexion: within functional limits  Left rotation: decreased  Right rotation: decreased  Left Hip   Flexion: WFL  Extension: WFL  Abduction: WFL  Adduction: WFL  External rotation (90/90): 30 degrees   Internal rotation (90/90): WFL    Right Hip   Flexion: WFL  Extension: WFL  Abduction: WFL  Adduction: WFL  External rotation (90/90): 35 degrees   Internal rotation (90/90): WFL    Strength:      Abdominals   Lower abdominals: Able to initiate but not maintain neutral    Left Hip   Planes of Motion  "  Flexion: 4-  Extension: 4-  Abduction: 3  Adduction: 4  External rotation: 4-  Internal rotation: 4-    Right Hip   Planes of Motion   Flexion: 4-  Extension: 4-  Abduction: 3  Adduction: 4-  External rotation: 4-  Internal rotation: 4-    Left Knee   Flexion: 5  Extension: 5    Right Knee   Flexion: 5  Extension: 5    Left Ankle/Foot   Dorsiflexion: 5  Plantar flexion: 5    Right Ankle/Foot   Dorsiflexion: 5  Plantar flexion: 5    Additional Strength Details  Bridge: pt able to obtain full AROM today with moderate pelvic instability  SL bridge: pt unable with BL moderate pelvic drop        Therapeutic Exercises (CPT 98799):     1. pt education, Exam Findings: POC    2. figure 4, 2x 30\", added to hep    3. LTR, x10, added to hep    4. bridge, x8, added to hep      Therapeutic Exercise Summary: Pt performed these exercises with instruction and SPV.  Provided handout of these exercises for daily HEP.     Access Code: [email or web address]  URL: [email or web address]  Date: 06/26/2024  Prepared by: Cyndi Mcneill    Exercises  - Supine Lower Trunk Rotation  - 1 x daily - 7 x weekly - 3 sets - 30 seconds  hold  - Supine Figure 4 Piriformis Stretch  - 1 x daily - 7 x weekly - 3 sets - 30 seconds hold  - Supine Bridge  - 1 x daily - 4 x weekly - 2 sets - 10 reps          Time-based treatments/modalities:    Physical Therapy Timed Treatment Charges  Therapeutic exercise minutes (CPT 16474): 10 minutes      Assessment, Response and Plan:   Impairments: abnormal or restricted ROM, activity intolerance, impaired physical strength, lacks appropriate home exercise program and limited mobility    Assessment details:  Pt is a 79 y.o female presenting to physical therapy with complaints of R hip pain. Pt has a PMH of anxiety, asthma, back pain, arrhythmia, depression, fatigue, GERD, HTN, and obesity. Pt presents with BL hip and low back pain consistent with dx of BL hip OA. Pt presents today with the following impairments: (+) C sign, " decreased l/s AROM in standing (flexion, BL rotation), decreased BL hip ER, generalized weakness in BLE (hip flexion, extension, abduction), fair core stability, reported activity intolerance from general deconditioning (decreased walking tolerance, decreased exercise tolerance).     Pt educated on exam findings and future POC, pt acknowledged understanding and is agreeable. Pt will benefit from skilled physical therapy to address the following impairments in order to improve functional mobility and overall QOL.  Pt should progress well towards goals if compliant with HEP and POC.     Prognosis: fair    Goals:   Short Term Goals:   1. Pt will report independence and compliance with written HEP   2. Pt will report an overall reduction of morning stiffness by >/= to 50% with addition of morning stretching routine  3. Pt will demo x2 rounds of 2 min ball bridge in order to improve core endurance in order to increase exercise tolerance  Short term goal time span:  2-4 weeks      Long Term Goals:    1. Pt will report independence and compliance with written HEP   2. Pt will report an increase in walking tolerance to >/= to 30 minutes with desired walking speed on even/uneven terrain in order to improve cardiovascular endurance and exercise tolerance  3. Pt will attend an organized exercise class >/= to 1x per week in order to improve exercise motivation, increase tolerance, and increase participation with community engagement  4. Pt will demo >/= to 4+/5 in BLE MMT in order to improve functional mobility   5. Pt will have a significant improvement in WOMAC with MDC of >/= to 3.94 points (eval: 20.83)   Long term goal time span:  6-8 weeks    Plan:   Therapy options:  Physical therapy treatment to continue  Planned therapy interventions:  Neuromuscular Re-education (CPT 86624), Therapeutic Activities (CPT 20434), Therapeutic Exercise (CPT 07055), Gait Training (CPT 80065), E Stim Unattended (CPT 33087) and Manual Therapy  (CPT 65410)  Frequency: 1-2x per week.  Duration in weeks:  8  Discussed with:  Patient  Plan details:  UPOC: 08/23/2024      Functional Assessment Used  WOMAC Grand Total: 20.83     Referring provider co-signature:  I have reviewed this plan of care and my co-signature certifies the need for services.    Certification Period: 06/26/2024 to  08/23/2024    Physician Signature: ________________________________ Date: ______________

## 2024-06-26 ENCOUNTER — PHYSICAL THERAPY (OUTPATIENT)
Dept: PHYSICAL THERAPY | Facility: MEDICAL CENTER | Age: 79
End: 2024-06-26
Attending: FAMILY MEDICINE
Payer: MEDICARE

## 2024-06-26 DIAGNOSIS — M16.11 OSTEOARTHRITIS OF RIGHT HIP, UNSPECIFIED OSTEOARTHRITIS TYPE: ICD-10-CM

## 2024-06-26 PROCEDURE — 97161 PT EVAL LOW COMPLEX 20 MIN: CPT

## 2024-06-26 PROCEDURE — 97110 THERAPEUTIC EXERCISES: CPT

## 2024-06-26 ASSESSMENT — ENCOUNTER SYMPTOMS
PAIN SCALE: 0
PAIN SCALE AT LOWEST: 0
PAIN LOCATION: R HIP AND LOW BACK
PAIN SCALE AT HIGHEST: 5

## 2024-07-02 ENCOUNTER — APPOINTMENT (OUTPATIENT)
Dept: PHYSICAL THERAPY | Facility: MEDICAL CENTER | Age: 79
End: 2024-07-02
Payer: MEDICARE

## 2024-07-02 DIAGNOSIS — M16.11 OSTEOARTHRITIS OF RIGHT HIP, UNSPECIFIED OSTEOARTHRITIS TYPE: ICD-10-CM

## 2024-07-02 PROCEDURE — 97110 THERAPEUTIC EXERCISES: CPT

## 2024-07-05 ENCOUNTER — HOME STUDY (OUTPATIENT)
Dept: SLEEP MEDICINE | Facility: MEDICAL CENTER | Age: 79
End: 2024-07-05
Attending: STUDENT IN AN ORGANIZED HEALTH CARE EDUCATION/TRAINING PROGRAM
Payer: MEDICARE

## 2024-07-05 DIAGNOSIS — G47.33 OSA (OBSTRUCTIVE SLEEP APNEA): ICD-10-CM

## 2024-07-05 DIAGNOSIS — G47.34 NOCTURNAL HYPOXIA: ICD-10-CM

## 2024-07-05 PROCEDURE — 94762 N-INVAS EAR/PLS OXIMTRY CONT: CPT | Performed by: INTERNAL MEDICINE

## 2024-07-05 PROCEDURE — 94762 N-INVAS EAR/PLS OXIMTRY CONT: CPT | Performed by: STUDENT IN AN ORGANIZED HEALTH CARE EDUCATION/TRAINING PROGRAM

## 2024-07-08 DIAGNOSIS — E78.1 HYPERTRIGLYCERIDEMIA: ICD-10-CM

## 2024-07-09 ENCOUNTER — PHYSICAL THERAPY (OUTPATIENT)
Dept: PHYSICAL THERAPY | Facility: MEDICAL CENTER | Age: 79
End: 2024-07-09
Attending: FAMILY MEDICINE
Payer: MEDICARE

## 2024-07-09 DIAGNOSIS — M16.11 OSTEOARTHRITIS OF RIGHT HIP, UNSPECIFIED OSTEOARTHRITIS TYPE: ICD-10-CM

## 2024-07-09 PROCEDURE — 97110 THERAPEUTIC EXERCISES: CPT

## 2024-07-09 RX ORDER — OMEPRAZOLE 20 MG/1
40 CAPSULE, DELAYED RELEASE ORAL 2 TIMES DAILY
Qty: 200 CAPSULE | Refills: 2 | Status: SHIPPED | OUTPATIENT
Start: 2024-07-09 | End: 2024-07-12

## 2024-07-09 RX ORDER — EZETIMIBE 10 MG/1
10 TABLET ORAL DAILY
Qty: 100 TABLET | Refills: 2 | Status: SHIPPED | OUTPATIENT
Start: 2024-07-09

## 2024-07-10 NOTE — OP THERAPY DAILY TREATMENT
"  Outpatient Physical Therapy  DAILY TREATMENT     [facility] Outpatient Physical Therapy  [address]  [place] [address]  Phone:  [phone]  Fax:  [phone]    Date: 07/11/2024    Patient: Melania Johnson  YOB: 1945  MRN: [id number]     Time Calculation                   Chief Complaint: No chief complaint on file.    Visit #: 4    SUBJECTIVE:  Pt states    OBJECTIVE:  Current objective measures:           Therapeutic Exercises (CPT 64465):     1. NuStep, x5 minutes, cardiovascular warm up    2. figure 4, 2x 30\", hep    3. LTR, x10, hep    4. bridge, 2x10, hep    5. hooklying fallout, 2x10 PTB, NT    6. clamshell, 2x 10, NT    7. leg press, 2x 12 5c, x10 3c SL    8. active hamstring stretch, x10 5 sec hold      Therapeutic Exercise Summary: Pt performed these exercises with instruction and SPV.  Provided handout of these exercises for daily HEP.     Access Code: [email or web address]  URL: [email or web address]  Date: 06/26/2024  Prepared by: Cyndi Mcneill    Exercises  - Supine Lower Trunk Rotation  - 1 x daily - 7 x weekly - 3 sets - 30 seconds  hold  - Supine Figure 4 Piriformis Stretch  - 1 x daily - 7 x weekly - 3 sets - 30 seconds hold  - Supine Bridge  - 1 x daily - 4 x weekly - 2 sets - 10 reps          Time-based treatments/modalities:           Pain rating (1-10) before treatment:  {PAIN NUMBERS_1-10:52518}  Pain rating (1-10) after treatment:  {PAIN NUMBERS_1-10:02238}    ASSESSMENT:   Response to treatment:      Pt tolerated today's treatment session well with no increase in s/s. Pt with good tolerance with all additional there-x today focusing on increased mobility focus as pt performed all strengthening yesterday with increased glute soreness. Will resume all strengthening on next session.     PLAN/RECOMMENDATIONS:   Plan for treatment: therapy treatment to continue next visit.  Planned interventions for next visit: continue with current " treatment.

## 2024-07-11 ENCOUNTER — APPOINTMENT (OUTPATIENT)
Dept: PHYSICAL THERAPY | Facility: MEDICAL CENTER | Age: 79
End: 2024-07-11
Attending: FAMILY MEDICINE
Payer: MEDICARE

## 2024-07-12 ENCOUNTER — PATIENT MESSAGE (OUTPATIENT)
Dept: MEDICAL GROUP | Facility: LAB | Age: 79
End: 2024-07-12
Payer: MEDICARE

## 2024-07-12 PROCEDURE — RXMED WILLOW AMBULATORY MEDICATION CHARGE: Performed by: FAMILY MEDICINE

## 2024-07-12 RX ORDER — OMEPRAZOLE 20 MG/1
40 CAPSULE, DELAYED RELEASE ORAL DAILY
Qty: 200 CAPSULE | Refills: 3 | Status: SHIPPED | OUTPATIENT
Start: 2024-07-12 | End: 2025-08-16

## 2024-07-15 PROCEDURE — RXMED WILLOW AMBULATORY MEDICATION CHARGE: Performed by: FAMILY MEDICINE

## 2024-07-15 RX ORDER — ATENOLOL 50 MG/1
50 TABLET ORAL DAILY
Qty: 100 TABLET | Refills: 3 | Status: SHIPPED | OUTPATIENT
Start: 2024-07-15 | End: 2025-08-19

## 2024-07-16 ENCOUNTER — PHARMACY VISIT (OUTPATIENT)
Dept: PHARMACY | Facility: MEDICAL CENTER | Age: 79
End: 2024-07-16
Payer: COMMERCIAL

## 2024-07-16 ENCOUNTER — PHYSICAL THERAPY (OUTPATIENT)
Dept: PHYSICAL THERAPY | Facility: MEDICAL CENTER | Age: 79
End: 2024-07-16
Attending: FAMILY MEDICINE
Payer: MEDICARE

## 2024-07-16 DIAGNOSIS — M16.11 OSTEOARTHRITIS OF RIGHT HIP, UNSPECIFIED OSTEOARTHRITIS TYPE: ICD-10-CM

## 2024-07-16 PROCEDURE — 97110 THERAPEUTIC EXERCISES: CPT

## 2024-07-18 ENCOUNTER — PHYSICAL THERAPY (OUTPATIENT)
Dept: PHYSICAL THERAPY | Facility: MEDICAL CENTER | Age: 79
End: 2024-07-18
Attending: FAMILY MEDICINE
Payer: MEDICARE

## 2024-07-18 DIAGNOSIS — M16.11 OSTEOARTHRITIS OF RIGHT HIP, UNSPECIFIED OSTEOARTHRITIS TYPE: ICD-10-CM

## 2024-07-18 PROCEDURE — 97110 THERAPEUTIC EXERCISES: CPT

## 2024-07-22 PROCEDURE — RXMED WILLOW AMBULATORY MEDICATION CHARGE: Performed by: FAMILY MEDICINE

## 2024-07-23 ENCOUNTER — APPOINTMENT (OUTPATIENT)
Dept: PHYSICAL THERAPY | Facility: MEDICAL CENTER | Age: 79
End: 2024-07-23
Attending: FAMILY MEDICINE
Payer: MEDICARE

## 2024-07-25 ENCOUNTER — PHARMACY VISIT (OUTPATIENT)
Dept: PHARMACY | Facility: MEDICAL CENTER | Age: 79
End: 2024-07-25
Payer: COMMERCIAL

## 2024-07-25 ENCOUNTER — PHYSICAL THERAPY (OUTPATIENT)
Dept: PHYSICAL THERAPY | Facility: MEDICAL CENTER | Age: 79
End: 2024-07-25
Attending: FAMILY MEDICINE
Payer: MEDICARE

## 2024-07-25 DIAGNOSIS — M16.11 OSTEOARTHRITIS OF RIGHT HIP, UNSPECIFIED OSTEOARTHRITIS TYPE: ICD-10-CM

## 2024-07-25 PROCEDURE — 97110 THERAPEUTIC EXERCISES: CPT

## 2024-07-30 ENCOUNTER — APPOINTMENT (OUTPATIENT)
Dept: PHYSICAL THERAPY | Facility: MEDICAL CENTER | Age: 79
End: 2024-07-30
Attending: FAMILY MEDICINE
Payer: MEDICARE

## 2024-08-01 ENCOUNTER — APPOINTMENT (OUTPATIENT)
Dept: PHYSICAL THERAPY | Facility: MEDICAL CENTER | Age: 79
End: 2024-08-01
Attending: FAMILY MEDICINE
Payer: MEDICARE

## 2024-08-02 ENCOUNTER — TELEMEDICINE (OUTPATIENT)
Dept: SLEEP MEDICINE | Facility: MEDICAL CENTER | Age: 79
End: 2024-08-02
Attending: PHYSICIAN ASSISTANT
Payer: MEDICARE

## 2024-08-02 VITALS — HEIGHT: 64 IN | WEIGHT: 180 LBS | BODY MASS INDEX: 30.73 KG/M2

## 2024-08-02 DIAGNOSIS — G47.33 OSA (OBSTRUCTIVE SLEEP APNEA): ICD-10-CM

## 2024-08-02 DIAGNOSIS — G47.34 NOCTURNAL HYPOXIA: ICD-10-CM

## 2024-08-02 PROCEDURE — 99213 OFFICE O/P EST LOW 20 MIN: CPT | Mod: 95 | Performed by: PHYSICIAN ASSISTANT

## 2024-08-02 ASSESSMENT — ENCOUNTER SYMPTOMS
ORTHOPNEA: 0
DIZZINESS: 0
SPUTUM PRODUCTION: 0
TREMORS: 0
PALPITATIONS: 0
SORE THROAT: 0
FEVER: 0
ROS GI COMMENTS: NO DENTURES, ONE MISSING MOLAR, NO SWALLOWING ISSUES
SINUS PAIN: 0
INSOMNIA: 1
HEADACHES: 0
WEIGHT LOSS: 0
COUGH: 1
HEARTBURN: 1
SHORTNESS OF BREATH: 0
CHILLS: 0
WHEEZING: 0

## 2024-08-02 ASSESSMENT — FIBROSIS 4 INDEX: FIB4 SCORE: 2.57

## 2024-08-02 NOTE — PROGRESS NOTES
"Virtual Visit: Established Patient   This visit was conducted via  [employer] teams  using secure and encrypted videoconferencing technology.   The patient was in their home in the [facility].    The patient's identity was confirmed and verbal consent was obtained for this virtual visit.     Subjective:     Chief Complaint   Patient presents with    Apnea     Last Office Visit 6/20/24 with     Home Sleep Study Complete on 7/5/24     OPO Completed on OPO ON AUTO BIPAP 9/18 PS 4 AND 2 LPM O2 Pressures Completed on 7/5/24    Settings: AUTO BIPAP 9/18 PS 4 AND 2 LPM O2         HPI:  Jackie Johnson is a 79 y.o. year old female here today for follow-up on obstructive sleep apnea and overnight oximetry results .  Patient last seen in clinic 6/20/2024 with Dr. Arturo Rodrigues.     Past Medical History: PSVT, major recurrent depressive disorder, GERD, essential hypertension, asthma, IBS, primary osteoarthritis, bronchitis, obesity.    Vitals:  Ht 1.626 m (5' 4\")   Wt 81.6 kg (180 lb)   BMI 30.90 kg/m²     Recent Imaging: Chest x-ray obtained 9/8/2023 demonstrated no definitive acute cardiopulmonary abnormality.    Echocardiogram obtained 7/11/2022 demonstrated normal left ventricular chamber size, wall thickness, systolic and diastolic function.  LVEF estimated 55%.  Normal right ventricular size and systolic function.  Trace mitral regurgitation.  Unable to estimate pulmonary artery pressure due to inadequate tricuspid regurgitant jet.    Currently using  Resmed auto Bi-PAP @18/9/4 cm H20 pressure; compliance reviewed for 6/3/2024 through 8/1/2024, days used 59/60, average daily usage 8 hours 10 minutes, 98% of days greater than or equal to 4 hours, mask leak at 34.8 LPM at 95th percentile, AHI 2.6 per hour.  Patient currently using 2 L O2 bleed in with BiPAP.  See media for full report.    Device obtained March 2024  DME provider [employer]  Mask interface nasal pillows    Overnight home sleep study obtained " 10/4/2023 demonstrated findings consistent with severe obstructive sleep apnea with pAHI of 44.7 events per hour and P RDI of 62.1 events per hour.  Low O2 sat of 71% with sats less than or equal to 88 411.7 minutes of evaluated time.  Patient was recommended to complete a titration study due to use severity of sleep apnea and persistent nocturnal oxygen desaturation.    Titration study obtained 11/5/2023 demonstrated adequate response to BiPAP at 22/16 with pressure support of 4.  Did well on several CPAP pressures however demonstrated persistent need for oxygen.  Severe nocturnal oxygen desaturation was noted with sats less than 88 for 267.4 minutes of total sleep time and low oxygen saturation of 76%.  Also noted elevated periodic limb movements at 15.9/h.    Overnight oximetry obtained 7/5/2024 demonstrated inadequate recorded time and may have been completed with 4 L O2 rather than 2 L.  Will need to be repeated with short-term follow-up to review.      Symptoms insomnia and fatigue, patient reports dry mouth, excessive pressures at time, takes melatonin and 25 mg trazodone nightly.    [place] Sleepiness Scale No data recorded   Stop Bang Score 4 (9/15/2023 11:40 AM)         Review of Systems   Constitutional:  Positive for malaise/fatigue. Negative for chills, fever and weight loss.   HENT:  Positive for congestion and nosebleeds (resolved off Advil). Negative for hearing loss, sinus pain, sore throat and tinnitus.    Respiratory:  Positive for cough (has asthma). Negative for sputum production, shortness of breath and wheezing.    Cardiovascular:  Positive for leg swelling (mild right). Negative for chest pain, palpitations and orthopnea.   Gastrointestinal:  Positive for heartburn (controlled on omeprazole).        No dentures, one missing molar, no swallowing issues    Neurological:  Negative for dizziness, tremors and headaches.   Psychiatric/Behavioral:  The patient has insomnia (sleep maintenance).         Past Medical History:   Diagnosis Date    Allergy     Anxiety     Arthritis     Asthma     Back pain     Back pain     Bronchitis     Cardiac arrhythmia     Chest pressure 07/11/2022    Chickenpox     Constipation     Cough     Depression     Dizziness     Dysrhythmia     Fatigue     GERD (gastroesophageal reflux disease)     Hair loss 10/27/2021    History of nonmelanoma skin cancer 03/21/2018    Formatting of this note might be different from the original. 2016 right upper arm squamous cell carcinoma status post edc.  Plan: yearly dermatology exam    Hypertension     Mumps     Nasal drainage     Obesity     Painful joint     Ringing in ears     Tonsillitis     Vaginal Papanicolaou smear not required due to history of hysterectomy 02/28/2013    Formatting of this note might be different from the original. Cervix is surgically absent       Past Surgical History:   Procedure Laterality Date    ABDOMINAL HYSTERECTOMY TOTAL      ARTHROPLASTY      ARTHROSCOPY, KNEE      CARPAL TUNNEL RELEASE      EYE SURGERY      OTHER ORTHOPEDIC SURGERY Bilateral     knee replacements    [place] REMV 2ND CATARACT,CORN-SCLER SECTN      TONSILLECTOMY      TUBAL COAGULATION LAPAROSCOPIC BILATERAL         Family History   Problem Relation Age of Onset    Cancer Father         Prostate    Diabetes Father     Heart Disease Father     Prostate cancer Father     Colorectal Cancer Maternal Grandfather     Glaucoma Maternal Grandfather     Colon Cancer Maternal Grandfather     Heart Disease Maternal Grandmother        Social History     Socioeconomic History    Marital status:      Spouse name: Not on file    Number of children: Not on file    Years of education: Not on file    Highest education level: 12th grade   Occupational History    Not on file   Tobacco Use    Smoking status: Former     Current packs/day: 0.00     Average packs/day: 1 pack/day for 56.0 years (56.0 ttl pk-yrs)     Types: Cigarettes     Start date: 10/5/1964      Quit date: 10/10/1985     Years since quittin.8    Smokeless tobacco: Never   Vaping Use    Vaping status: Never Used   Substance and Sexual Activity    Alcohol use: Yes     Alcohol/week: 4.2 oz     Types: 7 Glasses of wine per week     Comment: occ    Drug use: No    Sexual activity: Yes     Partners: Male     Birth control/protection: Female Sterilization, Post-Menopausal   Other Topics Concern    Not on file   Social History Narrative    Not on file     Social Determinants of Health     Financial Resource Strain: Patient Declined (3/7/2023)    Overall Financial Resource Strain (CARDIA)     Difficulty of Paying Living Expenses: Patient declined   Food Insecurity: No Food Insecurity (3/7/2023)    Hunger Vital Sign     Worried About Running Out of Food in the Last Year: Never true     Ran Out of Food in the Last Year: Never true   Transportation Needs: Unmet Transportation Needs (3/7/2023)    PRAPARE - Transportation     Lack of Transportation (Medical): Yes     Lack of Transportation (Non-Medical): No   Physical Activity: Sufficiently Active (3/7/2023)    Exercise Vital Sign     Days of Exercise per Week: 5 days     Minutes of Exercise per Session: 50 min   Stress: Stress Concern Present (3/7/2023)    [place] citizen [place] of Occupational [employer] - Occupational Stress Questionnaire     Feeling of Stress : To some extent   Social Connections: Moderately Integrated (3/7/2023)    Social Connection and Isolation Panel [NHANES]     Frequency of Communication with Friends and Family: More than three times a week     Frequency of Social Gatherings with Friends and Family: More than three times a week     Attends [employer] Services: Never     Active Member of Clubs or Organizations: Yes     Attends Club or Organization Meetings: 1 to 4 times per year     Marital Status:    Intimate Partner Violence: Not on file   Housing Stability: Low Risk  (3/7/2023)    Housing Stability Vital Sign     Unable to Pay for Housing in  the Last Year: No     Number of Places Lived in the Last Year: 1     Unstable Housing in the Last Year: No       Allergies as of 08/02/2024 - Reviewed 08/02/2024   Allergen Reaction Noted    Morphine  10/08/2007    Erythromycin Nausea 06/24/2011    Hydrocodone-acetaminophen  10/08/2007    Mastisol adhesive [mastisol] Rash 10/23/2012    Meperidine hcl Unspecified 09/27/2021    Other drug  08/27/2018          Current medications as of today   Current Outpatient Medications   Medication Sig Dispense Refill    atenolol (TENORMIN) 50 MG Tab Take 1 Tablet by mouth every day. 100 Tablet 3    omeprazole (PRILOSEC) 20 MG delayed-release capsule Take 2 Capsules by mouth every day. 200 Capsule 3    ezetimibe (ZETIA) 10 MG Tab Take 1 Tablet by mouth every day. 100 Tablet 2    sertraline (ZOLOFT) 100 MG Tab Take 1 tablet by mouth every day 100 Tablet 0    sertraline (ZOLOFT) 25 MG tablet Take 1 Tablet by mouth every day. 90 Tablet 3    traZODone (DESYREL) 50 MG Tab Cut in half and take one half by mouth before bed take the second half if not able to sleep 1 hour after first dose. 90 Tablet 3    simvastatin (ZOCOR) 40 MG Tab Take 1 Tablet by mouth every evening. 100 Tablet 3    fluticasone (FLOVENT HFA) 110 MCG/ACT Aerosol Inhale 1 Puff 2 times a day. 12 g 1    albuterol 108 (90 Base) MCG/ACT Aero Soln inhalation aerosol Inhale 1 to 2 puffs by mouth every 4 to 6 hours as needed for wheezing and cough 8.5 g 2     No current facility-administered medications for this visit.          Objective:   Physical Exam:  Constitutional: Alert, no distress, well-groomed.  Skin: No rashes in visible areas.  Eye: Round. Conjunctiva clear, lids normal. No icterus.   ENMT: Lips pink without lesions, good dentition, moist mucous membranes. Phonation normal.  Neck: No masses, no thyromegaly. Moves freely without pain.  Respiratory: Unlabored respiratory effort, no cough or audible wheeze  Psych: Alert and oriented x3, normal affect and mood.      Assessment and Plan:   The following treatment plan was discussed:     1. LIZETH (obstructive sleep apnea)  - Overnight Oximetry; Future    Patient continues to report excessive dry mouth and acknowledges she may be opening mouth at night.  Trial nathaniel red chinstrap.  Will provide sample of Juli fullface mask when sample available.  Patient amenable to trial.  Reviewed melatonin dosage.  Patient reminded to use distilled water only in humidifier chamber.  Reviewed equipment cleaning.  Reviewed equipment replacement schedule.  Patient requesting we contact her regarding overnight oximetry results for clear instructions regarding O2 use.  Understands at this point to obtain testing on 2 L and BiPAP.  Will follow-up in 3 months to reassess BiPAP/mask tolerance.    2. Nocturnal hypoxia    Patient is continuing to use and benefit from oxygen use.  Overnight oximetry previously attempted but unfortunately inadequate data obtained to inform care.  Will need to repeat overnight oximetry.  Order placed.      Follow-up:   Return in about 3 months (around 11/2/2024) for Return with Tenisha Jimenez PA-C.

## 2024-08-02 NOTE — PATIENT INSTRUCTIONS
1-extended release melatonin:  Pure Encapsulation or Remfresh at 3-5 mg  2-consider nathaniel red chin strap  3-SAMPLE Juli full face mask when avail, call patient   4-repeat overnight oximetry   5-As a reminder use distilled water only in humidifier chamber.    6-Today we reviewed equipment cleaning  once weekly minimum  mask, tubing and water chamber  use dedicated container  use mild soap and water  SoClean or other ozone  are not recommended  white vinegar and water solution is no longer recommended  hang tubing to dry  mask sanitizing wipes are an option for use   7-Equipment replacement schedule : Mask cushion every month, Head gear every 6 months, Tubing every 3 months, Ultra-fine filters 2 times per month, Humidifier chamber every 6 months  8-contact patient regarding repeat overnight oximetry results    9-follow up 3 months

## 2024-08-15 NOTE — TELEPHONE ENCOUNTER
Received request via: Pharmacy    Was the patient seen in the last year in this department? Yes3/18/24    Does the patient have an active prescription (recently filled or refills available) for medication(s) requested? No    Pharmacy Name: [facility]    Does the patient have correction Plus and need 100-day supply? (This applies to ALL medications)

## 2024-08-16 PROCEDURE — RXMED WILLOW AMBULATORY MEDICATION CHARGE: Performed by: FAMILY MEDICINE

## 2024-08-16 RX ORDER — SIMVASTATIN 40 MG
40 TABLET ORAL EVERY EVENING
Qty: 100 TABLET | Refills: 3 | Status: SHIPPED | OUTPATIENT
Start: 2024-08-16 | End: 2025-09-20

## 2024-08-19 ENCOUNTER — PHARMACY VISIT (OUTPATIENT)
Dept: PHARMACY | Facility: MEDICAL CENTER | Age: 79
End: 2024-08-19
Payer: COMMERCIAL

## 2024-08-22 ENCOUNTER — TELEPHONE (OUTPATIENT)
Dept: HEALTH INFORMATION MANAGEMENT | Facility: OTHER | Age: 79
End: 2024-08-22
Payer: MEDICARE

## 2024-08-30 ENCOUNTER — APPOINTMENT (OUTPATIENT)
Dept: SLEEP MEDICINE | Facility: MEDICAL CENTER | Age: 79
End: 2024-08-30
Attending: PHYSICIAN ASSISTANT
Payer: MEDICARE

## 2024-09-20 ENCOUNTER — OFFICE VISIT (OUTPATIENT)
Dept: MEDICAL GROUP | Facility: LAB | Age: 79
End: 2024-09-20
Payer: MEDICARE

## 2024-09-20 VITALS
HEIGHT: 64 IN | SYSTOLIC BLOOD PRESSURE: 118 MMHG | HEART RATE: 70 BPM | DIASTOLIC BLOOD PRESSURE: 62 MMHG | TEMPERATURE: 98 F | WEIGHT: 188.71 LBS | RESPIRATION RATE: 18 BRPM | BODY MASS INDEX: 32.22 KG/M2 | OXYGEN SATURATION: 92 %

## 2024-09-20 DIAGNOSIS — E78.5 HYPERLIPIDEMIA, UNSPECIFIED HYPERLIPIDEMIA TYPE: ICD-10-CM

## 2024-09-20 DIAGNOSIS — U07.1 COVID-19: ICD-10-CM

## 2024-09-20 DIAGNOSIS — I10 ESSENTIAL HYPERTENSION: ICD-10-CM

## 2024-09-20 PROCEDURE — 99214 OFFICE O/P EST MOD 30 MIN: CPT | Performed by: STUDENT IN AN ORGANIZED HEALTH CARE EDUCATION/TRAINING PROGRAM

## 2024-09-20 PROCEDURE — 3074F SYST BP LT 130 MM HG: CPT | Performed by: STUDENT IN AN ORGANIZED HEALTH CARE EDUCATION/TRAINING PROGRAM

## 2024-09-20 PROCEDURE — 3078F DIAST BP <80 MM HG: CPT | Performed by: STUDENT IN AN ORGANIZED HEALTH CARE EDUCATION/TRAINING PROGRAM

## 2024-09-20 ASSESSMENT — ENCOUNTER SYMPTOMS
COUGH: 0
FEVER: 0
CHILLS: 0

## 2024-09-20 ASSESSMENT — FIBROSIS 4 INDEX: FIB4 SCORE: 2.57

## 2024-09-20 NOTE — PROGRESS NOTES
"Subjective:     CC:   Chief Complaint   Patient presents with    Fatigue     POS Covid 2 wks/ neg this week    Cough        HPI:   History of Present Illness         Problem   Covid-19    Patient had covid 2 weeks ago. She had body aches, fatigue, headaches, cough. She did test positive this week. She is feeling better overall, but would like her lungs listened too.      Essential Hypertension    Patient is on atenolol. BP controlled      Hyperlipidemia    Tolerating zetia and simvastatin          Current Outpatient Medications Ordered in Epic   Medication Sig Dispense Refill    simvastatin (ZOCOR) 40 MG Tab Take 1 Tablet by mouth every evening. 100 Tablet 3    atenolol (TENORMIN) 50 MG Tab Take 1 Tablet by mouth every day. 100 Tablet 3    omeprazole (PRILOSEC) 20 MG delayed-release capsule Take 2 Capsules by mouth every day. 200 Capsule 3    ezetimibe (ZETIA) 10 MG Tab Take 1 Tablet by mouth every day. 100 Tablet 2    sertraline (ZOLOFT) 100 MG Tab Take 1 tablet by mouth every day 100 Tablet 0    sertraline (ZOLOFT) 25 MG tablet Take 1 Tablet by mouth every day. 90 Tablet 3    traZODone (DESYREL) 50 MG Tab Cut in half and take one half by mouth before bed take the second half if not able to sleep 1 hour after first dose. 90 Tablet 3    fluticasone (FLOVENT HFA) 110 MCG/ACT Aerosol Inhale 1 Puff 2 times a day. 12 g 1    albuterol 108 (90 Base) MCG/ACT Aero Soln inhalation aerosol Inhale 1 to 2 puffs by mouth every 4 to 6 hours as needed for wheezing and cough 8.5 g 2     No current Epic-ordered facility-administered medications on file.           ROS:  Review of Systems   Constitutional:  Negative for chills and fever.   Respiratory:  Negative for cough.    Cardiovascular:  Negative for chest pain.       Objective:     Exam:  /62 (BP Location: Right arm, Patient Position: Sitting, BP Cuff Size: Adult)   Pulse 70   Temp 36.7 °C (98 °F) (Temporal)   Resp 18   Ht 1.626 m (5' 4\")   Wt 85.6 kg (188 lb 11.4 oz) "   SpO2 92%   BMI 32.39 kg/m²  Body mass index is 32.39 kg/m².    Physical Exam  Constitutional:       General: She is not in acute distress.     Appearance: She is not ill-appearing.   Cardiovascular:      Rate and Rhythm: Normal rate and regular rhythm.   Pulmonary:      Effort: Pulmonary effort is normal. No respiratory distress.      Breath sounds: No wheezing, rhonchi or rales.   Neurological:      Mental Status: She is alert.   Psychiatric:         Mood and Affect: Mood normal.         Behavior: Behavior normal.         Thought Content: Thought content normal.         Judgment: Judgment normal.                   Assessment & Plan:     Problem List Items Addressed This Visit       COVID-19     Acute-improved  -no wheezing, rails or rhonci noted on exam          Essential hypertension     Chronic-stable  -continue atenolol          Hyperlipidemia     Chronic-stable  -continue simvastatin and zetia               Please note that this dictation was created using voice recognition software. I have made every reasonable attempt to correct obvious errors, but I expect that there are errors of grammar and possibly content that I did not discover before finalizing the note.

## 2024-09-23 ENCOUNTER — PATIENT MESSAGE (OUTPATIENT)
Dept: SLEEP MEDICINE | Facility: MEDICAL CENTER | Age: 79
End: 2024-09-23
Payer: MEDICARE

## 2024-09-25 ENCOUNTER — OFFICE VISIT (OUTPATIENT)
Dept: URGENT CARE | Facility: CLINIC | Age: 79
End: 2024-09-25
Payer: MEDICARE

## 2024-09-25 ENCOUNTER — PHARMACY VISIT (OUTPATIENT)
Dept: PHARMACY | Facility: MEDICAL CENTER | Age: 79
End: 2024-09-25
Payer: COMMERCIAL

## 2024-09-25 VITALS
SYSTOLIC BLOOD PRESSURE: 120 MMHG | WEIGHT: 168 LBS | RESPIRATION RATE: 16 BRPM | HEART RATE: 68 BPM | DIASTOLIC BLOOD PRESSURE: 72 MMHG | TEMPERATURE: 97 F | HEIGHT: 64 IN | OXYGEN SATURATION: 94 % | BODY MASS INDEX: 28.68 KG/M2

## 2024-09-25 DIAGNOSIS — J04.0 ACUTE LARYNGITIS: ICD-10-CM

## 2024-09-25 DIAGNOSIS — U07.1 COVID-19: ICD-10-CM

## 2024-09-25 DIAGNOSIS — R05.2 SUBACUTE COUGH: ICD-10-CM

## 2024-09-25 PROCEDURE — 99213 OFFICE O/P EST LOW 20 MIN: CPT | Performed by: STUDENT IN AN ORGANIZED HEALTH CARE EDUCATION/TRAINING PROGRAM

## 2024-09-25 PROCEDURE — 3074F SYST BP LT 130 MM HG: CPT | Performed by: STUDENT IN AN ORGANIZED HEALTH CARE EDUCATION/TRAINING PROGRAM

## 2024-09-25 PROCEDURE — RXMED WILLOW AMBULATORY MEDICATION CHARGE: Performed by: STUDENT IN AN ORGANIZED HEALTH CARE EDUCATION/TRAINING PROGRAM

## 2024-09-25 PROCEDURE — 3078F DIAST BP <80 MM HG: CPT | Performed by: STUDENT IN AN ORGANIZED HEALTH CARE EDUCATION/TRAINING PROGRAM

## 2024-09-25 RX ORDER — PREDNISONE 20 MG/1
20 TABLET ORAL DAILY
Qty: 5 TABLET | Refills: 0 | Status: SHIPPED | OUTPATIENT
Start: 2024-09-25 | End: 2024-09-28

## 2024-09-25 ASSESSMENT — FIBROSIS 4 INDEX: FIB4 SCORE: 2.57

## 2024-09-25 NOTE — PROGRESS NOTES
Subjective:   Jackie Johnson is a 79 y.o. female who presents for Cough (X 2 weeks, post covid cough, chest congestion.)      HPI:  79-year-old female presents to urgent care for 2 weeks of dry persistent cough following COVID-19 infection.  Also having some raspiness to the voice.  Denies any new sick exposures.  No chest pain, shortness of breath, nausea, vomiting, dizziness, headache, sputum production, fever, or chills.    Medications:    albuterol Aers  atenolol Tabs  ezetimibe Tabs  fluticasone Aero  omeprazole  predniSONE Tabs  sertraline  sertraline Tabs  simvastatin Tabs  traZODone Tabs    Allergies: Morphine, Erythromycin, Hydrocodone-acetaminophen, Mastisol adhesive [mastisol], Meperidine hcl, and Other drug    Problem List: Jackie Johnson does not have any pertinent problems on file.    Surgical History:  Past Surgical History:   Procedure Laterality Date    ABDOMINAL HYSTERECTOMY TOTAL      ARTHROPLASTY      ARTHROSCOPY, KNEE      CARPAL TUNNEL RELEASE      EYE SURGERY      OTHER ORTHOPEDIC SURGERY Bilateral     knee replacements    AK REMV 2ND CATARACT,CORN-SCLER SECTN      TONSILLECTOMY      TUBAL COAGULATION LAPAROSCOPIC BILATERAL         Past Social Hx: Jackie Johnson  reports that she quit smoking about 38 years ago. Her smoking use included cigarettes. She started smoking about 60 years ago. She has a 56 pack-year smoking history. She has never used smokeless tobacco. She reports current alcohol use of about 4.2 oz of alcohol per week. She reports that she does not use drugs.     Past Family Hx:  Jackie Johnson family history includes Cancer in her father; Colon Cancer in her maternal grandfather; Colorectal Cancer in her maternal grandfather; Diabetes in her father; Glaucoma in her maternal grandfather; Heart Disease in her father and maternal grandmother; Prostate cancer in her father.     Problem list, medications, and allergies reviewed by myself today in Epic.     Objective:  "    /72   Pulse 68   Temp 36.1 °C (97 °F) (Temporal)   Resp 16   Ht 1.626 m (5' 4\")   Wt 76.2 kg (168 lb)   SpO2 94%   BMI 28.84 kg/m²     Physical Exam  Vitals reviewed.   HENT:      Mouth/Throat:      Mouth: Mucous membranes are moist.      Pharynx: No posterior oropharyngeal erythema.   Cardiovascular:      Rate and Rhythm: Normal rate and regular rhythm.      Pulses: Normal pulses.      Heart sounds: Normal heart sounds. No murmur heard.  Pulmonary:      Effort: Pulmonary effort is normal. No respiratory distress.      Breath sounds: Normal breath sounds. No stridor. No wheezing, rhonchi or rales.         Assessment/Plan:     Diagnosis and associated orders:     1. Subacute cough  predniSONE (DELTASONE) 20 MG Tab      2. COVID-19        3. Acute laryngitis           Comments/MDM:     Pulmonary exam today shows no wheezing, rales, rhonchi.  No signs of pneumonia.  Vitals are stable.  Afebrile.  COVID-19 2 weeks ago.  Prednisone to better control the patient's cough.  She does have what appears to be acute laryngitis.  No signs of strep throat on exam.  Discussed home supportive care.  Discussed typical timeframe for resolution of symptoms.  Unlikely this is a new viral infection but is still possible.  Return precautions given and advised to continue to monitor symptoms.         Differential diagnosis, natural history, supportive care, and indications for immediate follow-up discussed.    Advised the patient to follow-up with the primary care physician for recheck, reevaluation, and consideration of further management.    Please note that this dictation was created using voice recognition software. I have made a reasonable attempt to correct obvious errors, but I expect that there are errors of grammar and possibly content that I did not discover before finalizing the note.    Electronically signed by Geovanni Benitez PA-C.      "

## 2024-09-26 ENCOUNTER — APPOINTMENT (OUTPATIENT)
Dept: SLEEP MEDICINE | Facility: MEDICAL CENTER | Age: 79
End: 2024-09-26
Payer: MEDICARE

## 2024-09-26 NOTE — PROGRESS NOTES
Patient contacted.  Has adjusted to current mask and replacement not needed.  She did have to reschedule her overnight oximetry due to upper respiratory illness.  She is scheduled on November 1 with follow up appointment with Dr. Rodrigues in December.

## 2024-09-28 ENCOUNTER — OFFICE VISIT (OUTPATIENT)
Dept: URGENT CARE | Facility: CLINIC | Age: 79
End: 2024-09-28
Payer: MEDICARE

## 2024-09-28 VITALS
WEIGHT: 186 LBS | TEMPERATURE: 97.7 F | RESPIRATION RATE: 19 BRPM | OXYGEN SATURATION: 96 % | DIASTOLIC BLOOD PRESSURE: 84 MMHG | SYSTOLIC BLOOD PRESSURE: 126 MMHG | HEART RATE: 79 BPM | BODY MASS INDEX: 31.76 KG/M2 | HEIGHT: 64 IN

## 2024-09-28 DIAGNOSIS — R06.02 SHORTNESS OF BREATH: ICD-10-CM

## 2024-09-28 DIAGNOSIS — J40 BRONCHITIS: ICD-10-CM

## 2024-09-28 DIAGNOSIS — J45.909 UNCOMPLICATED ASTHMA, UNSPECIFIED ASTHMA SEVERITY, UNSPECIFIED WHETHER PERSISTENT: ICD-10-CM

## 2024-09-28 DIAGNOSIS — J32.9 RHINOSINUSITIS: ICD-10-CM

## 2024-09-28 DIAGNOSIS — R05.1 ACUTE COUGH: ICD-10-CM

## 2024-09-28 RX ORDER — FLUTICASONE PROPIONATE 110 UG/1
2 AEROSOL, METERED RESPIRATORY (INHALATION) 2 TIMES DAILY
Qty: 1 EACH | Refills: 1 | Status: SHIPPED | OUTPATIENT
Start: 2024-09-28

## 2024-09-28 RX ORDER — ALBUTEROL SULFATE 90 UG/1
1-2 INHALANT RESPIRATORY (INHALATION) EVERY 4 HOURS PRN
Qty: 1 EACH | Refills: 0 | Status: SHIPPED | OUTPATIENT
Start: 2024-09-28 | End: 2024-09-28

## 2024-09-28 RX ORDER — METHYLPREDNISOLONE 4 MG
TABLET, DOSE PACK ORAL
Qty: 21 TABLET | Refills: 0 | Status: SHIPPED | OUTPATIENT
Start: 2024-09-28

## 2024-09-28 RX ORDER — FLUTICASONE PROPIONATE 110 UG/1
2 AEROSOL, METERED RESPIRATORY (INHALATION) 2 TIMES DAILY
Qty: 1 EACH | Refills: 1 | Status: SHIPPED | OUTPATIENT
Start: 2024-09-28 | End: 2024-09-28

## 2024-09-28 RX ORDER — BENZONATATE 100 MG/1
100 CAPSULE ORAL 3 TIMES DAILY PRN
Qty: 20 CAPSULE | Refills: 0 | Status: SHIPPED | OUTPATIENT
Start: 2024-09-28

## 2024-09-28 RX ORDER — BENZONATATE 100 MG/1
100 CAPSULE ORAL 3 TIMES DAILY PRN
Qty: 20 CAPSULE | Refills: 0 | Status: SHIPPED | OUTPATIENT
Start: 2024-09-28 | End: 2024-09-28

## 2024-09-28 RX ORDER — METHYLPREDNISOLONE 4 MG
TABLET, DOSE PACK ORAL
Qty: 21 TABLET | Refills: 0 | Status: SHIPPED | OUTPATIENT
Start: 2024-09-28 | End: 2024-09-28

## 2024-09-28 ASSESSMENT — ENCOUNTER SYMPTOMS: COUGH: 1

## 2024-09-28 ASSESSMENT — FIBROSIS 4 INDEX: FIB4 SCORE: 2.57

## 2024-09-28 NOTE — PROGRESS NOTES
Subjective:   Jackie Johnson is a 79 y.o. female who presents for Congestion (5 days) and Cough (5 days)     Patient seen on 9/25/2024 for dry persistent cough x 2 weeks following COVID-19 infection with raspy voice.  No chest pain shortness of breath nausea vomiting dizziness headache fever chills.  Patient was treated with prednisone for her subacute cough and laryngitis..    Today patient reports ongoing purulent nasal discharge, sinus pressure, persistent cough productive of yellow-green phlegm.  She denies fevers or chills.  She is completing her fifth day of 20 mg prednisone.  The cough is keeping her awake at night.  She does have a history of underlying asthma but has not been using her inhalers.  She does have a prescription for Flovent and albuterol.          Review of Systems   Respiratory:  Positive for cough.        Medications:  albuterol Aers  atenolol Tabs  ezetimibe Tabs  fluticasone Aero  omeprazole  predniSONE Tabs  sertraline  sertraline Tabs  simvastatin Tabs  traZODone Tabs    Allergies:             Morphine, Erythromycin, Hydrocodone-acetaminophen, Mastisol adhesive [mastisol], Meperidine hcl, and Other drug    Surgical History:         Past Surgical History:   Procedure Laterality Date    ABDOMINAL HYSTERECTOMY TOTAL      ARTHROPLASTY      ARTHROSCOPY, KNEE      CARPAL TUNNEL RELEASE      EYE SURGERY      OTHER ORTHOPEDIC SURGERY Bilateral     knee replacements    NM REMV 2ND CATARACT,CORN-SCLER SECTN      TONSILLECTOMY      TUBAL COAGULATION LAPAROSCOPIC BILATERAL         Past Social Hx:  Jackie Johnson  reports that she quit smoking about 38 years ago. Her smoking use included cigarettes. She started smoking about 60 years ago. She has a 56 pack-year smoking history. She has never used smokeless tobacco. She reports current alcohol use of about 4.2 oz of alcohol per week. She reports that she does not use drugs.     Past Family Hx:   Jackie Johnson family history includes Cancer in  "her father; Colon Cancer in her maternal grandfather; Colorectal Cancer in her maternal grandfather; Diabetes in her father; Glaucoma in her maternal grandfather; Heart Disease in her father and maternal grandmother; Prostate cancer in her father.       Problem list, medications, and allergies reviewed by myself today in Epic.     Objective:     /84   Pulse 79   Temp 36.5 °C (97.7 °F) (Temporal)   Resp 19   Ht 1.626 m (5' 4\")   Wt 84.4 kg (186 lb)   SpO2 96%   BMI 31.93 kg/m²     Physical Exam  Vitals and nursing note reviewed.   Constitutional:       General: She is not in acute distress.     Appearance: Normal appearance. She is well-developed. She is not ill-appearing, toxic-appearing or diaphoretic.   HENT:      Head: Normocephalic.      Right Ear: Ear canal and external ear normal. No tenderness. A middle ear effusion is present. No mastoid tenderness. Tympanic membrane is injected. Tympanic membrane is not perforated or bulging. Tympanic membrane has decreased mobility.      Left Ear: Ear canal and external ear normal. No tenderness. A middle ear effusion is present. No mastoid tenderness. Tympanic membrane is injected. Tympanic membrane is not perforated or bulging. Tympanic membrane has decreased mobility.      Nose: Mucosal edema, congestion and rhinorrhea present.      Right Nostril: No foreign body.      Left Nostril: No foreign body.      Right Turbinates: Swollen.      Left Turbinates: Swollen.      Right Sinus: Maxillary sinus tenderness and frontal sinus tenderness present.      Left Sinus: Maxillary sinus tenderness and frontal sinus tenderness present.      Mouth/Throat:      Mouth: Mucous membranes are moist.      Pharynx: Uvula midline. Posterior oropharyngeal erythema present. No pharyngeal swelling, oropharyngeal exudate or uvula swelling.      Tonsils: No tonsillar exudate or tonsillar abscesses.      Comments: Mild pharyngeal edema.  No tonsillar exudate.  Eyes:      General:    "      Right eye: No discharge.         Left eye: No discharge.      Extraocular Movements: Extraocular movements intact.      Conjunctiva/sclera: Conjunctivae normal.      Pupils: Pupils are equal, round, and reactive to light.   Cardiovascular:      Rate and Rhythm: Normal rate and regular rhythm.      Pulses: Normal pulses.      Heart sounds: Normal heart sounds. No murmur heard.     No friction rub.   Pulmonary:      Effort: Pulmonary effort is normal. No tachypnea, accessory muscle usage or respiratory distress.      Breath sounds: Normal breath sounds and air entry. No stridor or decreased air movement. No decreased breath sounds, wheezing, rhonchi or rales.      Comments: Trace expiratory wheeze, no rhonchi or rales.  Spasmodic cough heard throughout examination  Chest:      Chest wall: No tenderness.   Abdominal:      Palpations: Abdomen is soft.   Musculoskeletal:         General: Normal range of motion.      Cervical back: Normal range of motion. No rigidity.      Right lower leg: No edema.      Left lower leg: No edema.   Lymphadenopathy:      Cervical: No cervical adenopathy.   Skin:     General: Skin is warm and dry.   Neurological:      Mental Status: She is alert and oriented to person, place, and time.   Psychiatric:         Behavior: Behavior is cooperative.         Assessment/Plan:     Diagnosis and Associated Orders:     1. Acute cough  - DX-CHEST-2 VIEWS  - benzonatate (TESSALON) 100 MG Cap; Take 1 Capsule by mouth 3 times a day as needed for Cough.  Dispense: 20 Capsule; Refill: 0    2. Shortness of breath  - DX-CHEST-2 VIEWS    3. Uncomplicated asthma, unspecified asthma severity, unspecified whether persistent  - fluticasone (FLOVENT HFA) 110 MCG/ACT Aerosol; Inhale 2 Puffs 2 times a day.  Dispense: 1 Each; Refill: 1    4. Rhinosinusitis  - amoxicillin-clavulanate (AUGMENTIN) 875-125 MG Tab; Take 1 Tablet by mouth 2 times a day for 7 days.  Dispense: 14 Tablet; Refill: 0  - methylPREDNISolone  (MEDROL DOSEPAK) 4 MG Tablet Therapy Pack; Follow schedule on package instructions.  Dispense: 21 Tablet; Refill: 0    5. Bronchitis  - methylPREDNISolone (MEDROL DOSEPAK) 4 MG Tablet Therapy Pack; Follow schedule on package instructions.  Dispense: 21 Tablet; Refill: 0  - benzonatate (TESSALON) 100 MG Cap; Take 1 Capsule by mouth 3 times a day as needed for Cough.  Dispense: 20 Capsule; Refill: 0        Comments/MDM:  Based on duration of illness post COVID x 4 weeks now with purulent nasal discharge and postnasal drainage suspect bacterial sinusitis and bronchitis.  Recommend using albuterol and Flovent daily.  Augmentin prescribed.  If symptoms do not improve in regards to spasmodic cough would initiate a second course of higher dose of steroid.  Adverse effects of steroids discussed.  Do not coadministered with NSAIDs.  Continue Mucinex.  Follow-up if cough persist for imaging.  We did discuss obtaining a chest x-ray today but she would like to hold off at this time.  Fortunately vital signs are stable and reassuring on today's exam.  I personally reviewed prior external notes and test results pertinent to today's visit. Supportive care, natural history, differential diagnoses, and indications for immediate follow-up discussed. Return to clinic or go to ED if symptoms worsen or persist.  Red flag symptoms discussed.  Patient/Parent/Guardian voices understanding. Follow-up with your primary care provider in 3-5 days.  All side effects of medication discussed including allergic response, GI upset, tendon injury, rash, sedation etc    Please note that this dictation was created using voice recognition software. I have made a reasonable attempt to correct obvious errors, but I expect that there are errors of grammar and possibly content that I did not discover before finalizing the note.    This note was electronically signed by Lidia Glasgow PA-C

## 2024-10-02 DIAGNOSIS — F33.0 MILD EPISODE OF RECURRENT MAJOR DEPRESSIVE DISORDER (HCC): ICD-10-CM

## 2024-10-02 RX ORDER — SERTRALINE HYDROCHLORIDE 100 MG/1
100 TABLET, FILM COATED ORAL DAILY
Qty: 100 TABLET | Refills: 1 | Status: SHIPPED | OUTPATIENT
Start: 2024-10-02

## 2024-10-07 ENCOUNTER — PATIENT MESSAGE (OUTPATIENT)
Dept: SLEEP MEDICINE | Facility: MEDICAL CENTER | Age: 79
End: 2024-10-07

## 2024-10-07 ENCOUNTER — OFFICE VISIT (OUTPATIENT)
Dept: MEDICAL GROUP | Facility: LAB | Age: 79
End: 2024-10-07
Payer: MEDICARE

## 2024-10-07 VITALS
TEMPERATURE: 96.8 F | WEIGHT: 180 LBS | OXYGEN SATURATION: 95 % | BODY MASS INDEX: 30.73 KG/M2 | HEART RATE: 63 BPM | DIASTOLIC BLOOD PRESSURE: 72 MMHG | HEIGHT: 64 IN | SYSTOLIC BLOOD PRESSURE: 116 MMHG | RESPIRATION RATE: 16 BRPM

## 2024-10-07 DIAGNOSIS — Z23 NEED FOR VACCINATION: ICD-10-CM

## 2024-10-07 DIAGNOSIS — R05.8 POST-VIRAL COUGH SYNDROME: ICD-10-CM

## 2024-10-07 DIAGNOSIS — G47.33 OSA (OBSTRUCTIVE SLEEP APNEA): ICD-10-CM

## 2024-10-07 PROCEDURE — 3078F DIAST BP <80 MM HG: CPT | Performed by: FAMILY MEDICINE

## 2024-10-07 PROCEDURE — 99213 OFFICE O/P EST LOW 20 MIN: CPT | Mod: 25 | Performed by: FAMILY MEDICINE

## 2024-10-07 PROCEDURE — 3074F SYST BP LT 130 MM HG: CPT | Performed by: FAMILY MEDICINE

## 2024-10-07 PROCEDURE — G0008 ADMIN INFLUENZA VIRUS VAC: HCPCS | Performed by: FAMILY MEDICINE

## 2024-10-07 PROCEDURE — 90662 IIV NO PRSV INCREASED AG IM: CPT | Performed by: FAMILY MEDICINE

## 2024-10-07 ASSESSMENT — FIBROSIS 4 INDEX: FIB4 SCORE: 2.57

## 2024-10-17 ENCOUNTER — APPOINTMENT (OUTPATIENT)
Dept: MEDICAL GROUP | Facility: LAB | Age: 79
End: 2024-10-17
Payer: MEDICARE

## 2024-10-20 PROCEDURE — RXMED WILLOW AMBULATORY MEDICATION CHARGE: Performed by: FAMILY MEDICINE

## 2024-10-21 ENCOUNTER — PHARMACY VISIT (OUTPATIENT)
Dept: PHARMACY | Facility: MEDICAL CENTER | Age: 79
End: 2024-10-21
Payer: COMMERCIAL

## 2024-10-24 ENCOUNTER — HOSPITAL ENCOUNTER (OUTPATIENT)
Dept: LAB | Facility: MEDICAL CENTER | Age: 79
End: 2024-10-24
Attending: FAMILY MEDICINE
Payer: MEDICARE

## 2024-10-24 ENCOUNTER — OFFICE VISIT (OUTPATIENT)
Dept: MEDICAL GROUP | Facility: LAB | Age: 79
End: 2024-10-24
Payer: MEDICARE

## 2024-10-24 VITALS
TEMPERATURE: 97.3 F | HEART RATE: 58 BPM | OXYGEN SATURATION: 96 % | SYSTOLIC BLOOD PRESSURE: 110 MMHG | BODY MASS INDEX: 31.58 KG/M2 | RESPIRATION RATE: 16 BRPM | HEIGHT: 64 IN | DIASTOLIC BLOOD PRESSURE: 74 MMHG | WEIGHT: 185 LBS

## 2024-10-24 DIAGNOSIS — R21 RASH: ICD-10-CM

## 2024-10-24 DIAGNOSIS — J45.909 UNCOMPLICATED ASTHMA, UNSPECIFIED ASTHMA SEVERITY, UNSPECIFIED WHETHER PERSISTENT: ICD-10-CM

## 2024-10-24 DIAGNOSIS — M17.0 BILATERAL PRIMARY OSTEOARTHRITIS OF KNEE: ICD-10-CM

## 2024-10-24 DIAGNOSIS — E78.5 HYPERLIPIDEMIA, UNSPECIFIED HYPERLIPIDEMIA TYPE: ICD-10-CM

## 2024-10-24 DIAGNOSIS — K21.9 GASTROESOPHAGEAL REFLUX DISEASE, UNSPECIFIED WHETHER ESOPHAGITIS PRESENT: ICD-10-CM

## 2024-10-24 DIAGNOSIS — I10 ESSENTIAL HYPERTENSION: ICD-10-CM

## 2024-10-24 DIAGNOSIS — I47.10 SUPRAVENTRICULAR TACHYCARDIA, PAROXYSMAL (HCC): ICD-10-CM

## 2024-10-24 DIAGNOSIS — G47.33 OSA (OBSTRUCTIVE SLEEP APNEA): ICD-10-CM

## 2024-10-24 DIAGNOSIS — Z00.00 ANNUAL WELLNESS VISIT: ICD-10-CM

## 2024-10-24 DIAGNOSIS — F33.0 MILD EPISODE OF RECURRENT MAJOR DEPRESSIVE DISORDER (HCC): ICD-10-CM

## 2024-10-24 PROBLEM — R11.2 NAUSEA & VOMITING: Status: RESOLVED | Noted: 2023-09-08 | Resolved: 2024-10-24

## 2024-10-24 PROBLEM — R94.30: Status: RESOLVED | Noted: 2023-10-06 | Resolved: 2024-10-24

## 2024-10-24 PROBLEM — D80.1 HYPOGAMMAGLOBULINEMIA (HCC): Status: RESOLVED | Noted: 2017-08-01 | Resolved: 2024-10-24

## 2024-10-24 LAB
ALBUMIN SERPL BCP-MCNC: 4.1 G/DL (ref 3.2–4.9)
ALBUMIN/GLOB SERPL: 1.7 G/DL
ALP SERPL-CCNC: 60 U/L (ref 30–99)
ALT SERPL-CCNC: 14 U/L (ref 2–50)
ANION GAP SERPL CALC-SCNC: 13 MMOL/L (ref 7–16)
AST SERPL-CCNC: 20 U/L (ref 12–45)
BILIRUB SERPL-MCNC: 0.5 MG/DL (ref 0.1–1.5)
BUN SERPL-MCNC: 11 MG/DL (ref 8–22)
CALCIUM ALBUM COR SERPL-MCNC: 9.1 MG/DL (ref 8.5–10.5)
CALCIUM SERPL-MCNC: 9.2 MG/DL (ref 8.5–10.5)
CHLORIDE SERPL-SCNC: 105 MMOL/L (ref 96–112)
CHOLEST SERPL-MCNC: 110 MG/DL (ref 100–199)
CO2 SERPL-SCNC: 26 MMOL/L (ref 20–33)
CREAT SERPL-MCNC: 0.56 MG/DL (ref 0.5–1.4)
GFR SERPLBLD CREATININE-BSD FMLA CKD-EPI: 94 ML/MIN/1.73 M 2
GLOBULIN SER CALC-MCNC: 2.4 G/DL (ref 1.9–3.5)
GLUCOSE SERPL-MCNC: 94 MG/DL (ref 65–99)
HDLC SERPL-MCNC: 55 MG/DL
LDLC SERPL CALC-MCNC: 42 MG/DL
POTASSIUM SERPL-SCNC: 5 MMOL/L (ref 3.6–5.5)
PROT SERPL-MCNC: 6.5 G/DL (ref 6–8.2)
SODIUM SERPL-SCNC: 144 MMOL/L (ref 135–145)
TRIGL SERPL-MCNC: 65 MG/DL (ref 0–149)

## 2024-10-24 PROCEDURE — 36415 COLL VENOUS BLD VENIPUNCTURE: CPT

## 2024-10-24 PROCEDURE — 3078F DIAST BP <80 MM HG: CPT | Performed by: FAMILY MEDICINE

## 2024-10-24 PROCEDURE — 80061 LIPID PANEL: CPT

## 2024-10-24 PROCEDURE — 3074F SYST BP LT 130 MM HG: CPT | Performed by: FAMILY MEDICINE

## 2024-10-24 PROCEDURE — G0439 PPPS, SUBSEQ VISIT: HCPCS | Performed by: FAMILY MEDICINE

## 2024-10-24 PROCEDURE — 99214 OFFICE O/P EST MOD 30 MIN: CPT | Mod: 25 | Performed by: FAMILY MEDICINE

## 2024-10-24 PROCEDURE — 80053 COMPREHEN METABOLIC PANEL: CPT

## 2024-10-24 PROCEDURE — RXMED WILLOW AMBULATORY MEDICATION CHARGE: Performed by: FAMILY MEDICINE

## 2024-10-24 RX ORDER — TRAZODONE HYDROCHLORIDE 50 MG/1
TABLET, FILM COATED ORAL
Qty: 90 TABLET | Refills: 3 | Status: SHIPPED | OUTPATIENT
Start: 2024-10-24

## 2024-10-24 RX ORDER — BENZONATATE 100 MG/1
100 CAPSULE ORAL 3 TIMES DAILY PRN
Qty: 30 CAPSULE | Refills: 3 | Status: SHIPPED | OUTPATIENT
Start: 2024-10-24

## 2024-10-24 RX ORDER — ALBUTEROL SULFATE 90 UG/1
INHALANT RESPIRATORY (INHALATION)
Qty: 18 G | Refills: 2 | Status: SHIPPED | OUTPATIENT
Start: 2024-10-24

## 2024-10-24 ASSESSMENT — ACTIVITIES OF DAILY LIVING (ADL): BATHING_REQUIRES_ASSISTANCE: 0

## 2024-10-24 ASSESSMENT — PATIENT HEALTH QUESTIONNAIRE - PHQ9: CLINICAL INTERPRETATION OF PHQ2 SCORE: 0

## 2024-10-24 ASSESSMENT — ENCOUNTER SYMPTOMS: GENERAL WELL-BEING: GOOD

## 2024-10-24 ASSESSMENT — FIBROSIS 4 INDEX: FIB4 SCORE: 2.57

## 2024-10-28 PROCEDURE — RXMED WILLOW AMBULATORY MEDICATION CHARGE: Performed by: FAMILY MEDICINE

## 2024-10-29 ENCOUNTER — PHARMACY VISIT (OUTPATIENT)
Dept: PHARMACY | Facility: MEDICAL CENTER | Age: 79
End: 2024-10-29
Payer: COMMERCIAL

## 2024-11-01 ENCOUNTER — HOME STUDY (OUTPATIENT)
Dept: SLEEP MEDICINE | Facility: MEDICAL CENTER | Age: 79
End: 2024-11-01
Attending: PHYSICIAN ASSISTANT
Payer: MEDICARE

## 2024-11-01 DIAGNOSIS — G47.33 OSA (OBSTRUCTIVE SLEEP APNEA): ICD-10-CM

## 2024-11-01 PROCEDURE — 94762 N-INVAS EAR/PLS OXIMTRY CONT: CPT | Performed by: INTERNAL MEDICINE

## 2024-11-01 PROCEDURE — 94762 N-INVAS EAR/PLS OXIMTRY CONT: CPT | Performed by: PHYSICIAN ASSISTANT

## 2024-11-09 NOTE — PROCEDURES
Overnight Pulse Oximetry    Data  Supplemental Oxygen:   2l  on BIPA  18/9 os 4  Analyzed Time:    9 hrs 4 mins and 52 secs  Basal (average) saturation:  91.4%  Sleep time with saturations < 88%:  11.9 minutes  Average low saturation:   89.9%  Average pulse rate:    62.5 bpm       Impression:    Adequate maintenance of saturation at night was seen for the majority of the study period.

## 2024-11-10 ENCOUNTER — PATIENT MESSAGE (OUTPATIENT)
Dept: MEDICAL GROUP | Facility: LAB | Age: 79
End: 2024-11-10
Payer: MEDICARE

## 2024-11-10 DIAGNOSIS — Z12.31 ENCOUNTER FOR SCREENING MAMMOGRAM FOR MALIGNANT NEOPLASM OF BREAST: ICD-10-CM

## 2024-11-11 NOTE — TELEPHONE ENCOUNTER
Mammogram has been ordered.  Before I order any test on the shoulder arm I do want to see her and examine the arm.  Please have her go ahead and make an appointment.  Thank you. Patient seen unresponsive from EMS.  Patient bag mask ventilation and patient intubated by MD without incident. #7.5 ETT 22 at teeth.  Patient placed on vent with below settings ABG pending.  Vent Mode: CMV/AC  (Continuous Mandatory Ventilation/ Assist Control)  Resp Rate (Set): 22 breaths/min  Tidal Volume (Set, mL): 400 mL  PEEP (cm H2O): 5 cmH2O    Toro Menchaca, RT
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
## 2024-11-14 ENCOUNTER — OFFICE VISIT (OUTPATIENT)
Dept: MEDICAL GROUP | Facility: LAB | Age: 79
End: 2024-11-14
Payer: MEDICARE

## 2024-11-14 ENCOUNTER — HOSPITAL ENCOUNTER (OUTPATIENT)
Dept: PULMONOLOGY | Facility: MEDICAL CENTER | Age: 79
End: 2024-11-14
Attending: FAMILY MEDICINE
Payer: MEDICARE

## 2024-11-14 ENCOUNTER — HOSPITAL ENCOUNTER (OUTPATIENT)
Facility: MEDICAL CENTER | Age: 79
End: 2024-11-14
Attending: FAMILY MEDICINE
Payer: MEDICARE

## 2024-11-14 VITALS
DIASTOLIC BLOOD PRESSURE: 68 MMHG | SYSTOLIC BLOOD PRESSURE: 116 MMHG | BODY MASS INDEX: 31.92 KG/M2 | TEMPERATURE: 98.2 F | HEIGHT: 64 IN | RESPIRATION RATE: 16 BRPM | OXYGEN SATURATION: 94 % | WEIGHT: 187 LBS | HEART RATE: 71 BPM

## 2024-11-14 DIAGNOSIS — J45.909 UNCOMPLICATED ASTHMA, UNSPECIFIED ASTHMA SEVERITY, UNSPECIFIED WHETHER PERSISTENT: ICD-10-CM

## 2024-11-14 DIAGNOSIS — G89.29 CHRONIC RIGHT SHOULDER PAIN: ICD-10-CM

## 2024-11-14 DIAGNOSIS — N30.90 CYSTITIS: ICD-10-CM

## 2024-11-14 DIAGNOSIS — R30.0 DYSURIA: ICD-10-CM

## 2024-11-14 DIAGNOSIS — M25.511 CHRONIC RIGHT SHOULDER PAIN: ICD-10-CM

## 2024-11-14 LAB
APPEARANCE UR: NORMAL
BILIRUB UR STRIP-MCNC: NEGATIVE MG/DL
COLOR UR AUTO: YELLOW
GLUCOSE UR STRIP.AUTO-MCNC: NEGATIVE MG/DL
KETONES UR STRIP.AUTO-MCNC: NEGATIVE MG/DL
LEUKOCYTE ESTERASE UR QL STRIP.AUTO: NORMAL
NITRITE UR QL STRIP.AUTO: NEGATIVE
PH UR STRIP.AUTO: 7.5 [PH] (ref 5–8)
PROT UR QL STRIP: NORMAL MG/DL
RBC UR QL AUTO: NEGATIVE
SP GR UR STRIP.AUTO: 1.02
UROBILINOGEN UR STRIP-MCNC: 0.2 MG/DL

## 2024-11-14 PROCEDURE — 99214 OFFICE O/P EST MOD 30 MIN: CPT | Performed by: FAMILY MEDICINE

## 2024-11-14 PROCEDURE — 3074F SYST BP LT 130 MM HG: CPT | Performed by: FAMILY MEDICINE

## 2024-11-14 PROCEDURE — 94060 EVALUATION OF WHEEZING: CPT | Mod: 26 | Performed by: FAMILY MEDICINE

## 2024-11-14 PROCEDURE — 94060 EVALUATION OF WHEEZING: CPT

## 2024-11-14 PROCEDURE — 87086 URINE CULTURE/COLONY COUNT: CPT

## 2024-11-14 PROCEDURE — 94726 PLETHYSMOGRAPHY LUNG VOLUMES: CPT | Mod: 26 | Performed by: FAMILY MEDICINE

## 2024-11-14 PROCEDURE — 81002 URINALYSIS NONAUTO W/O SCOPE: CPT | Performed by: FAMILY MEDICINE

## 2024-11-14 PROCEDURE — 94729 DIFFUSING CAPACITY: CPT | Mod: 26 | Performed by: FAMILY MEDICINE

## 2024-11-14 PROCEDURE — 94729 DIFFUSING CAPACITY: CPT

## 2024-11-14 PROCEDURE — 94726 PLETHYSMOGRAPHY LUNG VOLUMES: CPT

## 2024-11-14 PROCEDURE — RXMED WILLOW AMBULATORY MEDICATION CHARGE: Performed by: FAMILY MEDICINE

## 2024-11-14 PROCEDURE — 3078F DIAST BP <80 MM HG: CPT | Performed by: FAMILY MEDICINE

## 2024-11-14 RX ORDER — NITROFURANTOIN 25; 75 MG/1; MG/1
100 CAPSULE ORAL 2 TIMES DAILY
Qty: 14 CAPSULE | Refills: 0 | Status: SHIPPED | OUTPATIENT
Start: 2024-11-14 | End: 2024-11-22

## 2024-11-14 RX ORDER — ALBUTEROL SULFATE 5 MG/ML
2.5 SOLUTION RESPIRATORY (INHALATION)
Status: DISCONTINUED | OUTPATIENT
Start: 2024-11-14 | End: 2024-11-15 | Stop reason: HOSPADM

## 2024-11-14 RX ADMIN — ALBUTEROL SULFATE 2.5 MG: 5 SOLUTION RESPIRATORY (INHALATION) at 12:52

## 2024-11-14 ASSESSMENT — FIBROSIS 4 INDEX: FIB4 SCORE: 2.1

## 2024-11-14 NOTE — PROGRESS NOTES
Verbal consent was acquired by the patient to use [employer] ambient listening note generation during this visit Yes    Subjective:   Jackie Johnson is a 79 y.o. female here today for   Chief Complaint   Patient presents with    Requesting Labs     Imaging order requested Shoulder pain     UTI     History of Present Illness  The patient is a 79-year-old female presenting today with concerns regarding shoulder pain and symptoms suggestive of a potential urinary tract infection (UTI).    She reports experiencing pain in her right arm, which she believes may be originating from her shoulder. The pain, which has been present for over a week, is triggered by movement in any direction. She recalls carrying groceries up the stairs and attempting to open a stuck sliding window, but these are routine activities for her. She reports no associated weakness, numbness, or tingling in her hand. She has been managing the pain with Tylenol, taking two tablets twice daily, and applying heat and ice to the area. The pain has been so severe that it has disrupted her sleep, as she typically sleeps on her right side and has been unable to switch to her left side despite attempts to do so.    She has been experiencing a burning sensation during urination. She has been using pads frequently due to a loss of muscular control following a bout of bronchitis, which has led to urinary incontinence. She reports no fever, abdominal pain, or nausea. She has a history of back pain but notes no worsening of this symptom.    She recently underwent a pulmonary test, the results of which suggested chronic asthma and bronchitis. She was prescribed an albuterol inhaler for bronchitis but did not receive a steroid inhaler due to its high cost. She was advised to use the steroid inhaler regularly, not just when she is ill. She has been experiencing wheezing, shortness of breath, and persistent coughing. She found that taking two puffs of albuterol  "within an hour alleviated her coughing. She is scheduled for a mamm[facility] at [facility] tomorrow afternoon.    Supplemental Information:  She has dental work that she needs to get done. She has a pulmonary appointment scheduled for 12/09/2024.    IMMUNIZATIONS  She received her influenza vaccine a couple of weeks ago.      Allergies   Allergen Reactions    Morphine      Lowers heart rate    Erythromycin Nausea    Hydrocodone-Acetaminophen     Mastisol Adhesive [Mastisol] Rash    Meperidine Hcl Unspecified     [08/08/09 UNKNOWN, PLEASE VERIFY]    Other Drug      \"All narcotics make me nauseated\"         Current medicines (including changes today)  Current Outpatient Medications   Medication Sig Dispense Refill    benzonatate (TESSALON) 100 MG Cap Take 1 Capsule by mouth 3 times a day as needed for Cough. 30 Capsule 3    albuterol 108 (90 Base) MCG/ACT Aero Soln inhalation aerosol Inhale 1 to 2 puffs by mouth every 4 to 6 hours as needed for wheezing and cough 18 g 2    traZODone (DESYREL) 50 MG Tab Take 1/2 tab (cut the tablet in half) by mouth before bed take the 2nd half if not able to sleep 1 hour after first dose. 90 Tablet 3    sertraline (ZOLOFT) 100 MG Tab Take 1 tablet by mouth every day 100 Tablet 1    simvastatin (ZOCOR) 40 MG Tab Take 1 Tablet by mouth every evening. 100 Tablet 3    atenolol (TENORMIN) 50 MG Tab Take 1 Tablet by mouth every day. 100 Tablet 3    omeprazole (PRILOSEC) 20 MG delayed-release capsule Take 2 Capsules by mouth every day. 200 Capsule 3    ezetimibe (ZETIA) 10 MG Tab Take 1 Tablet by mouth every day. 100 Tablet 2     No current facility-administered medications for this visit.     Facility-Administered Medications Ordered in Other Visits   Medication Dose Route Frequency Provider Last Rate Last Admin    albuterol (Proventil) 2.5mg/0.5ml nebulizer solution 2.5 mg  2.5 mg Nebulization Q4H PRN (RT) Saeid Alvarado M.D.   2.5 mg at 11/14/24 1252     She  has a past " "medical history of Allergy, Anxiety, Arthritis, Asthma, Back pain, Back pain, Bronchitis, Cardiac arrhythmia, Chest pressure (07/11/2022), Chickenpox, Constipation, Cough, Depression, Dizziness, Dysrhythmia, Fatigue, GERD (gastroesophageal reflux disease), Hair loss (10/27/2021), History of nonmelanoma skin cancer (03/21/2018), Hypertension, Mumps, Nasal drainage, Obesity, Painful joint, Ringing in ears, Tonsillitis, and Vaginal Papanicolaou smear not required due to history of hysterectomy (02/28/2013).    ROS   ROS  -See HPI     Objective:     Physical Exam:  /68   Pulse 71   Temp 36.8 °C (98.2 °F) (Temporal)   Resp 16   Ht 1.626 m (5' 4.02\")   Wt 84.8 kg (187 lb)   SpO2 94%  Body mass index is 32.08 kg/m².   Constitutional: Alert, no distress, well-groomed.  Skin: No rashes in visible areas.  Eye: Round. Conjunctiva clear, lids normal. No icterus.   ENMT: Lips pink without lesions, good dentition, moist mucous membranes. Phonation normal.  Neck: No masses, no thyromegaly. Moves freely without pain.  Respiratory: Unlabored respiratory effort, no cough or audible wheeze  Psych: Alert and oriented x3, normal affect and mood.   MSK: Examination of shoulder was difficult given the severity of pain.  Patient did show positive decant test.  Tenderness palpation lateral aspect of shoulder.  Pain with internal rotation against resistance; however, no pain with external rotation against resistance.  All the maneuvers unable to be completed due to extensive pain.  Results  Laboratory Studies  Urine test confirms a UTI with positive leukocyte esterase and protein..    Assessment and Plan:     Assessment & Plan  1. Right shoulder pain.  The patient's symptoms suggest a possible case of rotator cuff tendinitis versus bursitis. She reports pain with movement and difficulty performing certain actions. An x-ray of the right shoulder will be ordered. She will be referred for physical therapy. She is advised to continue " using ice and heat to manage the pain.    2. Chronic obstructive pulmonary disease.  The patient reports chronic asthma and bronchitis, with recent wheezing and shortness of breath. A prescription for Qvar, to be used twice daily (one puff in the morning and one puff at night), will be provided. She is advised to rinse her mouth after each use. The prescription will be sent to [facility] pharmacy.    3. Urinary tract infection.  The diagnosis is confirmed based on the urine analysis. She reports burning sensation and has been wearing pads due to coughing from bronchitis. A prescription for Macrobid (nitrofurantoin) will be provided and sent to [facility] pharmacy.  Will complete culture of urine.  Follow-up with patient with results.    4. Health Maintenance.  The patient will have a mammogram at [facility].      Orders:  1. Cystitis  - nitrofurantoin (MACROBID) 100 MG Cap; Take 1 Capsule by mouth 2 times a day for 7 days.  Dispense: 14 Capsule; Refill: 0  - URINE CULTURE(NEW); Future    2. Dysuria  - POCT Urinalysis    3. Chronic right shoulder pain  - DX-SHOULDER 2+ RIGHT; Future  - Referral to Physical Therapy    4. Uncomplicated asthma, unspecified asthma severity, unspecified whether persistent  - beclomethasone HFA (QVAR REDIHALER) 40 MCG/ACT inhaler; Inhale 1 Puff 2 times a day.  Dispense: 10.6 g; Refill: 3        Followup: No follow-ups on file.         PLEASE NOTE: This dictation was created using voice recognition and [employer] ambient listening software. I have made every reasonable attempt to correct obvious errors, but I expect that there are errors of grammar and possibly content that I did not discover before finalizing the note.

## 2024-11-15 ENCOUNTER — APPOINTMENT (OUTPATIENT)
Dept: RADIOLOGY | Facility: MEDICAL CENTER | Age: 79
End: 2024-11-15
Attending: FAMILY MEDICINE
Payer: MEDICARE

## 2024-11-15 ENCOUNTER — TELEPHONE (OUTPATIENT)
Dept: MEDICAL GROUP | Facility: LAB | Age: 79
End: 2024-11-15

## 2024-11-15 ENCOUNTER — PHARMACY VISIT (OUTPATIENT)
Dept: PHARMACY | Facility: MEDICAL CENTER | Age: 79
End: 2024-11-15
Payer: COMMERCIAL

## 2024-11-15 DIAGNOSIS — Z12.31 ENCOUNTER FOR SCREENING MAMMOGRAM FOR MALIGNANT NEOPLASM OF BREAST: ICD-10-CM

## 2024-11-15 DIAGNOSIS — M25.511 CHRONIC RIGHT SHOULDER PAIN: ICD-10-CM

## 2024-11-15 DIAGNOSIS — G89.29 CHRONIC RIGHT SHOULDER PAIN: ICD-10-CM

## 2024-11-15 PROCEDURE — 73030 X-RAY EXAM OF SHOULDER: CPT | Mod: RT

## 2024-11-15 PROCEDURE — 77067 SCR MAMMO BI INCL CAD: CPT

## 2024-11-15 NOTE — PROCEDURES
DATE OF SERVICE:  11/14/2024     PULMONARY FUNCTION TEST INTERPRETATION     REFERRING PROVIDER:  Saeid Alvarado MD     INTERPRETING PROVIDER:  Sundar Richmond III, MD     INTERPRETATION:    1.  There is no significant obstructive ventilatory defect on spirometry.    There is a partial bronchodilator response in the mid flow rates.  This is   consistent with the reported diagnosis of asthma.  2.  Lung volumes are preserved.  3.  Diffusion capacity is similarly preserved.  4.  Flow volume loop is consistent with the above interpretation.  5.  No prior PFTs for comparison.        ______________________________  Sundar Richmond III, MD    [facility]/AKILAH    DD:  11/14/2024 15:41  DT:  11/14/2024 16:58    Job#:  [id number]

## 2024-11-15 NOTE — TELEPHONE ENCOUNTER
Patient called scheduling and stated that the script for the inhaler is not in stock at [facility] pharmacy and [facility].    Please call patient regarding this.    Patient did not provide another pharmacy but needs the medication.    #[phone] patient's contact

## 2024-11-17 LAB
BACTERIA UR CULT: NORMAL
SIGNIFICANT IND 70042: NORMAL
SITE SITE: NORMAL
SOURCE SOURCE: NORMAL

## 2024-11-18 ENCOUNTER — PATIENT MESSAGE (OUTPATIENT)
Dept: MEDICAL GROUP | Facility: LAB | Age: 79
End: 2024-11-18
Payer: MEDICARE

## 2024-11-18 DIAGNOSIS — M25.511 CHRONIC RIGHT SHOULDER PAIN: ICD-10-CM

## 2024-11-18 DIAGNOSIS — G89.29 CHRONIC RIGHT SHOULDER PAIN: ICD-10-CM

## 2024-11-20 ENCOUNTER — PHARMACY VISIT (OUTPATIENT)
Dept: PHARMACY | Facility: MEDICAL CENTER | Age: 79
End: 2024-11-20
Payer: COMMERCIAL

## 2024-11-20 PROCEDURE — RXMED WILLOW AMBULATORY MEDICATION CHARGE: Performed by: FAMILY MEDICINE

## 2024-11-21 ENCOUNTER — PATIENT MESSAGE (OUTPATIENT)
Dept: MEDICAL GROUP | Facility: LAB | Age: 79
End: 2024-11-21
Payer: MEDICARE

## 2024-11-21 DIAGNOSIS — M25.511 CHRONIC RIGHT SHOULDER PAIN: ICD-10-CM

## 2024-11-21 DIAGNOSIS — G89.29 CHRONIC RIGHT SHOULDER PAIN: ICD-10-CM

## 2024-11-21 NOTE — TELEPHONE ENCOUNTER
Referral placed for physical therapy.  For corticosteroid injection shoulder patient will need to follow-up with orthopedist.  New referral placed.

## 2024-12-18 ENCOUNTER — OFFICE VISIT (OUTPATIENT)
Dept: MEDICAL GROUP | Facility: LAB | Age: 79
End: 2024-12-18
Payer: MEDICARE

## 2024-12-18 VITALS
TEMPERATURE: 98.5 F | SYSTOLIC BLOOD PRESSURE: 112 MMHG | BODY MASS INDEX: 31.58 KG/M2 | WEIGHT: 185 LBS | OXYGEN SATURATION: 95 % | DIASTOLIC BLOOD PRESSURE: 70 MMHG | HEIGHT: 64 IN | HEART RATE: 78 BPM | RESPIRATION RATE: 16 BRPM

## 2024-12-18 DIAGNOSIS — J45.40 MODERATE PERSISTENT ASTHMA WITHOUT COMPLICATION: ICD-10-CM

## 2024-12-18 DIAGNOSIS — J18.9 PNEUMONIA OF RIGHT LOWER LOBE DUE TO INFECTIOUS ORGANISM: ICD-10-CM

## 2024-12-18 PROCEDURE — 99214 OFFICE O/P EST MOD 30 MIN: CPT | Performed by: FAMILY MEDICINE

## 2024-12-18 PROCEDURE — 3078F DIAST BP <80 MM HG: CPT | Performed by: FAMILY MEDICINE

## 2024-12-18 PROCEDURE — RXMED WILLOW AMBULATORY MEDICATION CHARGE: Performed by: FAMILY MEDICINE

## 2024-12-18 PROCEDURE — 3074F SYST BP LT 130 MM HG: CPT | Performed by: FAMILY MEDICINE

## 2024-12-18 RX ORDER — METHYLPREDNISOLONE 4 MG/1
TABLET ORAL
Qty: 21 TABLET | Refills: 0 | Status: SHIPPED | OUTPATIENT
Start: 2024-12-18

## 2024-12-18 RX ORDER — AZITHROMYCIN 250 MG/1
TABLET, FILM COATED ORAL
Qty: 6 TABLET | Refills: 0 | Status: SHIPPED | OUTPATIENT
Start: 2024-12-18 | End: 2024-12-23

## 2024-12-18 ASSESSMENT — FIBROSIS 4 INDEX: FIB4 SCORE: 2.1

## 2024-12-18 NOTE — PROGRESS NOTES
Verbal consent was acquired by the patient to use [employer] ambient listening note generation during this visit Yes    Subjective:   Jackie Johnson is a 79 y.o. female here today for   Chief Complaint   Patient presents with    Cough     History of Present Illness  The patient is a 79-year-old female who presents today to follow up on continued upper respiratory infection (URI) symptoms. She was previously seen throughout the last several months with ongoing cough symptoms, which have not stopped since first being diagnosed with COVID-19 back in September 2024.    She reports persistent coughing and a general feeling of malaise. Despite these symptoms, [facility] [place] [facility] where she experienced fatigue, difficulty walking, and shortness of breath, but did not feel ill. Upon her return, she visited a friend who was unwell, and subsequently fell ill herself two days later. She has been avoiding contact with this friend since the Monday after Thanksgiving. She has been experiencing urinary incontinence due to her prolonged coughing, necessitating the use of diapers for the past 3 weeks. She also reports occasional vomiting when she chokes on her sputum, which has recently turned yellow from clear. She has had a few episodes of fever in the past week, with one instance of a temperature of 101 degrees, which is significantly higher than her usual baseline of 97.6 degrees. She has not monitored her temperature this week. She has been supplementing with vitamin D3 and taking two Malgorzata vitamins daily. She plans to start taking vitamin C as well. She reports a lack of appetite. She has been using albuterol and Qvar inhalers, cough medication, Mucinex during the day, and Tylenol four times daily for pain management. She tested negative for COVID-19 today, but recalls a positive test result for two weeks in the past, followed by a week of bronchitis. She underwent a lung study, which  "revealed chronic asthma and bronchitis. Her pulmonologist contacted her two nights ago to inform her that her oxygen levels were suboptimal, necessitating an increase in her oxygen therapy from 2 L to 2.5 L.    ALLERGIES  The patient has an allergy to ERYTHROMYCIN, which causes nausea.    MEDICATIONS  Current: albuterol, Qvar, Mucinex, Tylenol      Allergies   Allergen Reactions    Morphine      Lowers heart rate    Erythromycin Nausea    Hydrocodone-Acetaminophen     Mastisol Adhesive [Mastisol] Rash    Meperidine Hcl Unspecified     [08/08/09 UNKNOWN, PLEASE VERIFY]    Other Drug      \"All narcotics make me nauseated\"         Current medicines (including changes today)  Current Outpatient Medications   Medication Sig Dispense Refill    beclomethasone HFA (QVAR REDIHALER) 40 MCG/ACT inhaler Inhale 1 Puff 2 times a day. 10.6 g 3    benzonatate (TESSALON) 100 MG Cap Take 1 Capsule by mouth 3 times a day as needed for Cough. 30 Capsule 3    albuterol 108 (90 Base) MCG/ACT Aero Soln inhalation aerosol Inhale 1 to 2 puffs by mouth every 4 to 6 hours as needed for wheezing and cough 18 g 2    traZODone (DESYREL) 50 MG Tab Take 1/2 tab (cut the tablet in half) by mouth before bed take the 2nd half if not able to sleep 1 hour after first dose. 90 Tablet 3    sertraline (ZOLOFT) 100 MG Tab Take 1 tablet by mouth every day 100 Tablet 1    simvastatin (ZOCOR) 40 MG Tab Take 1 Tablet by mouth every evening. 100 Tablet 3    atenolol (TENORMIN) 50 MG Tab Take 1 Tablet by mouth every day. 100 Tablet 3    omeprazole (PRILOSEC) 20 MG delayed-release capsule Take 2 Capsules by mouth every day. 200 Capsule 3    ezetimibe (ZETIA) 10 MG Tab Take 1 Tablet by mouth every day. 100 Tablet 2     No current facility-administered medications for this visit.     She  has a past medical history of Allergy, Anxiety, Arthritis, Asthma, Back pain, Back pain, Bronchitis, Cardiac arrhythmia, Chest pressure (07/11/2022), Chickenpox, Constipation, " "Cough, Depression, Dizziness, Dysrhythmia, Fatigue, GERD (gastroesophageal reflux disease), Hair loss (10/27/2021), History of nonmelanoma skin cancer (03/21/2018), Hypertension, Mumps, Nasal drainage, Obesity, Painful joint, Ringing in ears, Tonsillitis, and Vaginal Papanicolaou smear not required due to history of hysterectomy (02/28/2013).    ROS   ROS  -See HPI     Objective:     Physical Exam:  /70   Pulse 78   Temp 36.9 °C (98.5 °F) (Temporal)   Resp 16   Ht 1.626 m (5' 4.02\")   Wt 83.9 kg (185 lb)   SpO2 95%  Body mass index is 31.74 kg/m².   Constitutional: Alert, no distress.  Skin: Warm, dry, good turgor, no rashes in visible areas.  Eye: Equal, round and reactive, conjunctiva clear, lids normal.  Respiratory: Unlabored respiratory effort, significant expiratory rales heard in lower lung fields and right lung.    Cardiovascular: Normal S1, S2, no murmur, no edema.  Abdomen: Soft, non-tender, no masses, no hepatosplenomegaly.  Psych: Alert and oriented x3, normal affect and mood.    Results  Laboratory Studies  COVID-19 test was negative.    Testing  Lung study showed chronic asthma and chronic bronchitis.    Assessment and Plan:     Assessment & Plan  1. Pneumonia.  Her symptoms, including persistent cough, shortness of breath, and yellow sputum, are indicative of pneumonia. The presence of an underlying wheeze suggests a potential exacerbation of her asthma. She will be initiated on a regimen of Augmentin, to be taken twice daily for 7 days, and azithromycin, with a dosage of two tablets on the first day followed by one tablet daily for the subsequent four days. A Medrol Dosepak will also be prescribed, with instructions for gradual tapering. She is advised to continue her current inhaler therapy, including Qvar and albuterol, the latter to be used as a rescue medication during severe symptom episodes. She is further advised to wear a mask in public places and limit social interactions to " facilitate recovery. Adequate rest is also recommended.    Orders:  1. Pneumonia of right lower lobe due to infectious organism  - amoxicillin-clavulanate (AUGMENTIN) 875-125 MG Tab; Take 1 Tablet by mouth 2 times a day for 7 days.  Dispense: 14 Tablet; Refill: 0  - azithromycin (ZITHROMAX) 250 MG Tab; Take 2 Tablets by mouth every day for 1 day, THEN 1 Tablet every day for 4 days.  Dispense: 6 Tablet; Refill: 0    2. Moderate persistent asthma without complication  - methylPREDNISolone (MEDROL DOSEPAK) 4 MG Tablet Therapy Pack; As directed on the packaging label.  Dispense: 21 Tablet; Refill: 0        Followup: No follow-ups on file.         PLEASE NOTE: This dictation was created using voice recognition and [employer] ambient listening software. I have made every reasonable attempt to correct obvious errors, but I expect that there are errors of grammar and possibly content that I did not discover before finalizing the note.

## 2024-12-19 ENCOUNTER — PHARMACY VISIT (OUTPATIENT)
Dept: PHARMACY | Facility: MEDICAL CENTER | Age: 79
End: 2024-12-19
Payer: COMMERCIAL

## 2025-01-06 DIAGNOSIS — F33.0 MILD EPISODE OF RECURRENT MAJOR DEPRESSIVE DISORDER (HCC): ICD-10-CM

## 2025-01-06 RX ORDER — SERTRALINE HYDROCHLORIDE 100 MG/1
100 TABLET, FILM COATED ORAL DAILY
Qty: 100 TABLET | Refills: 3 | Status: SHIPPED | OUTPATIENT
Start: 2025-01-06

## 2025-01-06 NOTE — TELEPHONE ENCOUNTER
Received request via: Patient    Was the patient seen in the last year in this department? Yes    Does the patient have an active prescription (recently filled or refills available) for medication(s) requested? No    Pharmacy Name: [facility]     Does the patient have senior living Plus and need 100-day supply? (This applies to ALL medications) Patient does not have SCP.

## 2025-01-07 ENCOUNTER — OFFICE VISIT (OUTPATIENT)
Dept: SLEEP MEDICINE | Facility: MEDICAL CENTER | Age: 80
End: 2025-01-07
Attending: STUDENT IN AN ORGANIZED HEALTH CARE EDUCATION/TRAINING PROGRAM
Payer: MEDICARE

## 2025-01-07 VITALS
BODY MASS INDEX: 30.39 KG/M2 | OXYGEN SATURATION: 94 % | HEART RATE: 68 BPM | WEIGHT: 178 LBS | RESPIRATION RATE: 16 BRPM | HEIGHT: 64 IN | DIASTOLIC BLOOD PRESSURE: 64 MMHG | SYSTOLIC BLOOD PRESSURE: 120 MMHG

## 2025-01-07 DIAGNOSIS — G47.33 OSA (OBSTRUCTIVE SLEEP APNEA): ICD-10-CM

## 2025-01-07 PROCEDURE — 99214 OFFICE O/P EST MOD 30 MIN: CPT | Performed by: STUDENT IN AN ORGANIZED HEALTH CARE EDUCATION/TRAINING PROGRAM

## 2025-01-07 PROCEDURE — 3074F SYST BP LT 130 MM HG: CPT | Performed by: STUDENT IN AN ORGANIZED HEALTH CARE EDUCATION/TRAINING PROGRAM

## 2025-01-07 PROCEDURE — 99213 OFFICE O/P EST LOW 20 MIN: CPT | Performed by: STUDENT IN AN ORGANIZED HEALTH CARE EDUCATION/TRAINING PROGRAM

## 2025-01-07 PROCEDURE — 3078F DIAST BP <80 MM HG: CPT | Performed by: STUDENT IN AN ORGANIZED HEALTH CARE EDUCATION/TRAINING PROGRAM

## 2025-01-07 ASSESSMENT — FIBROSIS 4 INDEX: FIB4 SCORE: 2.1

## 2025-01-07 NOTE — PROGRESS NOTES
[facility] Follow-up Visit    CC: LIZETH follow-up      HPI:  Jackie Johnson is a 79 y.o.female with history of BMI 30, SVT, major depressive disorder, hypertension, hyperlipidemia, asthma, GERD, LIZETH with nocturnal hypoxemia on BiPAP with supplemental oxygen who presents for LIZETH follow-up.      Last seen by Dr. Rodrigues 6/28/2024 -at that time she was noted to be using her BiPAP machine with supplemental O2 nightly.  Residual AHI 2.5 events per hour.  18/9 with PS 4.  This having a hard time with obtaining a good mask seal.  She continued to have a dry mouth. She has not tried Biotene or XyliMelts.     Today -she is still having difficulty with her mask.  She has previously tried and 30, Respironics Pamella FFM (however seal is not correct), she has also tried F40 -however notes that the straps hurt the back of her head.  She has had a tough winter as she has been sick for most of it.  Initially had COVID and then developed bacterial pneumonia.  Is finally starting to feel better.  She has been dealing a lot this year with increasing stress due to having to place her  in memory care.      Machine: Air curve 10 vauto, 18/9, PS: 4 cmH2O  Date: 12/8-1/6/2025  Time used: 7 hours and 48 minutes  Mask: Nasal pillows  DME: Isleep  95th% pressure: 15.6/11.6.  Residual AHI: 1.3  Aerophagia: No   Snoring: No   Dry mouth: Yes if wearing nasal interface  95th percentile leak: improving, 27.5 LPM  Skin irritation: No   Chin strap: No     Sleep History  10/4/2023 HST WatchPAT study showed a 4% AHI of 44.7 events an hour, minimum oxygen saturation of 71%, time below 88% saturation was 111 minutes.  11/5/2023 PSG titration study showed patient did have improvement in respiratory events with CPAP and BiPAP.  She was recommended she try auto BiPAP EPAP 16 IPAP 22 pressure support 4 with bleed and supplemental oxygen 2 L/min  OPO 11/1/2024 - on BiPAP 18/9 with PS: 4 on  2LPM, avg low sat: 89.9%, 11.9 min <88% -thus O2  was increased to 2.5 LPM    Patient Active Problem List    Diagnosis Date Noted    COVID-19 09/20/2024    LIZETH (obstructive sleep apnea) 02/21/2024    Primary osteoarthritis of both hands 02/21/2024    Obesity (BMI 30-39.9) 10/06/2023    Atherosclerosis 10/06/2023    Diarrhea 09/08/2023    BMI 32.0-32.9,adult 09/08/2023    Irritable bowel syndrome 07/14/2023    Asthma 07/11/2022    Essential hypertension 01/31/2020    Hyperlipidemia 05/15/2018    Major depressive disorder, recurrent episode (HCC) 09/30/2014    Bilateral primary osteoarthritis of knee 03/13/2008    Supraventricular tachycardia, paroxysmal (HCC) 10/31/2007    GERD (gastroesophageal reflux disease) 10/31/2007       Past Medical History:   Diagnosis Date    Allergy     Anxiety     Arthritis     Asthma     Back pain     Back pain     Bronchitis     Cardiac arrhythmia     Chest pressure 07/11/2022    Chickenpox     Constipation     Cough     Depression     Dizziness     Dysrhythmia     Fatigue     GERD (gastroesophageal reflux disease)     Hair loss 10/27/2021    History of nonmelanoma skin cancer 03/21/2018    Formatting of this note might be different from the original. 2016 right upper arm squamous cell carcinoma status post edc.  Plan: yearly dermatology exam    Hypertension     Mumps     Nasal drainage     Obesity     Painful joint     Ringing in ears     Tonsillitis     Vaginal Papanicolaou smear not required due to history of hysterectomy 02/28/2013    Formatting of this note might be different from the original. Cervix is surgically absent        Past Surgical History:   Procedure Laterality Date    ABDOMINAL HYSTERECTOMY TOTAL      ARTHROPLASTY      ARTHROSCOPY, KNEE      CARPAL TUNNEL RELEASE      EYE SURGERY      OTHER ORTHOPEDIC SURGERY Bilateral     knee replacements    UT REMV 2ND CATARACT,CORN-SCLER SECTN      TONSILLECTOMY      TUBAL COAGULATION LAPAROSCOPIC BILATERAL         Family History   Problem Relation Age of Onset    Cancer Father          Prostate    Diabetes Father     Heart Disease Father     Prostate cancer Father     Colorectal Cancer Maternal Grandfather     Glaucoma Maternal Grandfather     Colon Cancer Maternal Grandfather     Heart Disease Maternal Grandmother        Social History     Socioeconomic History    Marital status:      Spouse name: Not on file    Number of children: Not on file    Years of education: Not on file    Highest education level: 12th grade   Occupational History    Not on file   Tobacco Use    Smoking status: Former     Current packs/day: 0.00     Average packs/day: 1 pack/day for 56.0 years (56.0 ttl pk-yrs)     Types: Cigarettes     Start date: 10/5/1964     Quit date: 10/10/1985     Years since quittin.2    Smokeless tobacco: Never   Vaping Use    Vaping status: Never Used   Substance and Sexual Activity    Alcohol use: Yes     Alcohol/week: 4.2 oz     Types: 7 Glasses of wine per week     Comment: occ    Drug use: No    Sexual activity: Yes     Partners: Male     Birth control/protection: Female Sterilization, Post-Menopausal   Other Topics Concern    Not on file   Social History Narrative    Not on file     Social Drivers of Health     Financial Resource Strain: Patient Declined (3/7/2023)    Overall Financial Resource Strain (CARDIA)     Difficulty of Paying Living Expenses: Patient declined   Food Insecurity: No Food Insecurity (3/7/2023)    Hunger Vital Sign     Worried About Running Out of Food in the Last Year: Never true     Ran Out of Food in the Last Year: Never true   Transportation Needs: Unmet Transportation Needs (3/7/2023)    PRAPARE - Transportation     Lack of Transportation (Medical): Yes     Lack of Transportation (Non-Medical): No   Physical Activity: Sufficiently Active (3/7/2023)    Exercise Vital Sign     Days of Exercise per Week: 5 days     Minutes of Exercise per Session: 50 min   Stress: Stress Concern Present (3/7/2023)    [employer] -  Occupational Stress Questionnaire     Feeling of Stress : To some extent   Social Connections: Moderately Integrated (3/7/2023)    Social Connection and Isolation Panel [NHANES]     Frequency of Communication with Friends and Family: More than three times a week     Frequency of Social Gatherings with Friends and Family: More than three times a week     Attends Hoahaoism Services: Never     Active Member of Clubs or Organizations: Yes     Attends Club or Organization Meetings: 1 to 4 times per year     Marital Status:    Intimate Partner Violence: Not on file   Housing Stability: Low Risk  (3/7/2023)    Housing Stability Vital Sign     Unable to Pay for Housing in the Last Year: No     Number of Places Lived in the Last Year: 1     Unstable Housing in the Last Year: No       Current Outpatient Medications   Medication Sig Dispense Refill    sertraline (ZOLOFT) 100 MG Tab Take 1 tablet by mouth every day 100 Tablet 3    beclomethasone HFA (QVAR REDIHALER) 40 MCG/ACT inhaler Inhale 1 Puff 2 times a day. 10.6 g 3    benzonatate (TESSALON) 100 MG Cap Take 1 Capsule by mouth 3 times a day as needed for Cough. 30 Capsule 3    albuterol 108 (90 Base) MCG/ACT Aero Soln inhalation aerosol Inhale 1 to 2 puffs by mouth every 4 to 6 hours as needed for wheezing and cough 18 g 2    traZODone (DESYREL) 50 MG Tab Take 1/2 tab (cut the tablet in half) by mouth before bed take the 2nd half if not able to sleep 1 hour after first dose. 90 Tablet 3    simvastatin (ZOCOR) 40 MG Tab Take 1 Tablet by mouth every evening. 100 Tablet 3    atenolol (TENORMIN) 50 MG Tab Take 1 Tablet by mouth every day. 100 Tablet 3    omeprazole (PRILOSEC) 20 MG delayed-release capsule Take 2 Capsules by mouth every day. 200 Capsule 3    ezetimibe (ZETIA) 10 MG Tab Take 1 Tablet by mouth every day. 100 Tablet 2    methylPREDNISolone (MEDROL DOSEPAK) 4 MG Tablet Therapy Pack As directed on the packaging label. (Patient not taking: Reported on  "1/7/2025) 21 Tablet 0     No current facility-administered medications for this visit.        ALLERGIES: Morphine, Erythromycin, Hydrocodone-acetaminophen, Mastisol adhesive [mastisol], Meperidine hcl, and Other drug    ROS  Constitutional: Denies fevers, Denies weight changes  Ears/Nose/Throat/Mouth: Denies nasal congestion or sore throat   Cardiovascular: Denies chest pain  Respiratory: Denies shortness of breath, Denies cough  Gastrointestinal/Hepatic: Denies nausea, vomiting  Sleep: see HPI      PHYSICAL EXAM  /64 (BP Location: Left arm, Patient Position: Sitting, BP Cuff Size: Adult)   Pulse 68   Resp 16   Ht 1.626 m (5' 4\")   Wt 80.7 kg (178 lb)   SpO2 94%   BMI 30.55 kg/m²   Appearance: Well-nourished, well-developed, no acute distress  Eyes:  No scleral icterus , EOMI  Musculoskeletal:  Grossly normal; gait and station normal; digits and nails normal  Skin:  No rashes, petechiae, cyanosis  Neurologic: without focal signs; oriented to person, time, place, and purpose; judgement intact        Medical Decision Making   Assessment and Plan  LIZETH with nocturnal hypoxemia on BiPAP    The medical record was reviewed.    Obstructive sleep apnea  Diagnostic and titration nocturnal polysomnogram's, home sleep apnea tests, continuous nocturnal oximetry results, multiple sleep latency tests, and compliance reports reviewed.  Compliance data reviewed showing 77% usage > 4hours in last 30  days. Average AHI 1.3 events/hour. Pt continues to use and benefit from machine.      Current Settings 18/9 with PS: 4 cmH2O    PLAN:   -Order placed for mask and supplies   -DME mask fitting to try F30i, or nasal interface with chinstrap  -Slightly increased EPAP to 12 cmH2O given slight mild hypoxemia on OPO on therapy  -Advised to reach out via MyChart with questions     Has been advised to continue the current BiPAP, clean equipment frequently, and get new mask and supplies as allowed by insurance and DME. Recommend an " earlier appointment, if significant treatment barriers develop.    Patients with LIZETH are at increased risk of cardiovascular disease including coronary artery disease, systemic arterial hypertension, pulmonary arterial hypertension, cardiac arrythmias, and stroke. The patient was advised to avoid driving a motor vehicle when drowsy.    Positive airway pressure will favorably impact many of the adverse conditions and effects provoked by LIZETH.    Have advised the patient to follow up with the appropriate healthcare practitioners for all other medical problems and issues.    Return in about 6 months (around 7/7/2025) for CPAP compliance.      Please note portions of this record was created using voice recognition software. I have made every reasonable attempt to correct obvious errors, but I expect that there are errors of grammar and possibly content I did not discover before finalizing the note.

## 2025-01-20 ENCOUNTER — PHARMACY VISIT (OUTPATIENT)
Dept: PHARMACY | Facility: MEDICAL CENTER | Age: 80
End: 2025-01-20
Payer: COMMERCIAL

## 2025-01-20 ENCOUNTER — TELEPHONE (OUTPATIENT)
Dept: PHYSICAL THERAPY | Facility: MEDICAL CENTER | Age: 80
End: 2025-01-20
Payer: MEDICARE

## 2025-01-20 DIAGNOSIS — M16.11 OSTEOARTHRITIS OF RIGHT HIP, UNSPECIFIED OSTEOARTHRITIS TYPE: ICD-10-CM

## 2025-01-20 PROCEDURE — RXMED WILLOW AMBULATORY MEDICATION CHARGE: Performed by: FAMILY MEDICINE

## 2025-01-20 NOTE — OP THERAPY DISCHARGE SUMMARY
Outpatient Physical Therapy  DISCHARGE SUMMARY NOTE      [facility] Outpatient Physical Therapy  [address]  [place] [address]  Phone:  [phone]  Fax:  [phone]    Date of Visit: 01/20/2025    Patient: Melania Johnson  YOB: 1945  MRN: [id number]     Referring Provider: No referring provider defined for this encounter.   Referring Diagnosis No admission diagnoses are documented for this encounter.         Functional Assessment Used        Your patient is being discharged from Physical Therapy with the following comments:   Personal Reasons for ending episode of care.  Starting new episode of care under different referral  Comments:  Pt was seen in skilled PT for 6 sessions with this provider. Pt chose to cancel remaining appointments during this episode of care due to family circumstances. Pt was notified she would have 30 days to reschedule follow up appointments. Pt has failed to schedule follow up appointments and will be discharged today due to attendance policy. Pt is also starting new episode of care under a different referral on 1/21/2025 for R shoulder pain.      Limitations Remaining:  Remaining limitations under this episode of care are unknown.     Recommendations:  D/C patient under this referral as pt is establishing new episode of care under new referral.      Cyndi Mcneill, PT    Date: 1/20/2025

## 2025-01-20 NOTE — OP THERAPY EVALUATION
"  Outpatient Physical Therapy  INITIAL EVALUATION    [facility] Outpatient Physical Therapy  [address]  [place] [address]  Phone:  [phone]  Fax:  [phone]    Date of Evaluation: 01/21/2025    Patient: Melania Johnson  YOB: 1945  MRN: [id number]     Referring Provider: Saeid Alvarado M.D.  [address] [facility]  [address]  [place] [address]   Referring Diagnosis Chronic right shoulder pain [M25.511, G89.29]     Time Calculation  Start time: 0815  Stop time: 0900 Time Calculation (min): 45 minutes         Chief Complaint: Shoulder Problem    Visit Diagnoses     ICD-10-CM   1. Chronic right shoulder pain  M25.511    G89.29       Date of onset of impairment: No data found    Subjective:   History of Present Illness:     Mechanism of injury:  Pt is a 79 y.o female presenting to physical therapy with complaints of chronic R shoulder pain. Pt has a PMH of anxiety, asthma, back pain, arrhythmia, depression, fatigue, GERD, HTN, and obesity. Pt reports that her pain started in October. Notes that her pain is primarily in her arm, notes it is in the posterior and lateral arm. Notes that her pain has came off over the past few months. Notes that she has been very sick over the winter which has restricted her exercise. Notes that she is doing everything around the house because her  is still in memory care. Notes that she has been doing some exercise.     Per MD on 11/14/2024:\"She reports experiencing pain in her right arm, which she believes may be originating from her shoulder. The pain, which has been present for over a week, is triggered by movement in any direction. She recalls carrying groceries up the stairs and attempting to open a stuck sliding window, but these are routine activities for her. She reports no associated weakness, numbness, or tingling in her hand. She has been managing the pain with Tylenol, taking two tablets twice daily, " "and applying heat and ice to the area. The pain has been so severe that it has disrupted her sleep, as she typically sleeps on her right side and has been unable to switch to her left side despite attempts to do so.\"    Pt denies dizziness, nausea, headaches, numbness/tingling into extremities, and recent visual changes. Pt consents to evaluation and treatment today.     Quality of life:  Fair  Sleep disturbance:  Interrupted sleep (due to pain in R shoulder and CPAP)  Pain:     Pain Comments::  Quality: sharp, shooting, constant ache    Aggravating Factors: taking off jacket, reaching with R arm, carrying objects, sleeping on R side, pulling pants up/self care after using toilet, moving quickly.     Relieving Factors:  tylenol, advil on very bad days, ice and heat  Hand dominance:  Right  Activities of Daily Living:     Patient reported ADL status: Pt reported ADLs: IND: modifying due to pain, has been using her L arm  Work: retired  Exercise: non currently   Hobbies:  Patient Goals:     Patient goals for therapy:  Decreased pain, increased motion and increased strength      Past Medical History:   Diagnosis Date    Allergy     Anxiety     Arthritis     Asthma     Back pain     Back pain     Bronchitis     Cardiac arrhythmia     Chest pressure 07/11/2022    Chickenpox     Constipation     Cough     Depression     Dizziness     Dysrhythmia     Fatigue     GERD (gastroesophageal reflux disease)     Hair loss 10/27/2021    History of nonmelanoma skin cancer 03/21/2018    Formatting of this note might be different from the original. 2016 right upper arm squamous cell carcinoma status post edc.  Plan: yearly dermatology exam    Hypertension     Mumps     Nasal drainage     Obesity     Painful joint     Ringing in ears     Tonsillitis     Vaginal Papanicolaou smear not required due to history of hysterectomy 02/28/2013    Formatting of this note might be different from the original. Cervix is surgically absent     Past " Surgical History:   Procedure Laterality Date    ABDOMINAL HYSTERECTOMY TOTAL      ARTHROPLASTY      ARTHROSCOPY, KNEE      CARPAL TUNNEL RELEASE      EYE SURGERY      OTHER ORTHOPEDIC SURGERY Bilateral     knee replacements    DE REMV 2ND CATARACT,CORN-SCLER SECTN      TONSILLECTOMY      TUBAL COAGULATION LAPAROSCOPIC BILATERAL       Social History     Tobacco Use    Smoking status: Former     Current packs/day: 0.00     Average packs/day: 1 pack/day for 56.0 years (56.0 ttl pk-yrs)     Types: Cigarettes     Start date: 10/5/1964     Quit date: 10/10/1985     Years since quittin.3    Smokeless tobacco: Never   Substance Use Topics    Alcohol use: Yes     Alcohol/week: 4.2 oz     Types: 7 Glasses of wine per week     Comment: occ     Family and Occupational History     Socioeconomic History    Marital status:      Spouse name: Not on file    Number of children: Not on file    Years of education: Not on file    Highest education level: 12th grade   Occupational History    Not on file       Objective     Palpation     Right   Tenderness of the posterior deltoid.     Tenderness     Right Shoulder  Tenderness in the AC joint, infraspinatus tendon and supraspinatus tendon.     Active Range of Motion     Right Shoulder   Flexion: 125 degrees with pain  Abduction: 73 degrees with pain  External rotation BTH: Active external rotation behind the head: R hand to R shoulder. with pain  Internal rotation BTB: Active internal rotation behind the back: R hand to R hip. with pain    Strength:      Left Shoulder   Normal muscle strength    Right Shoulder   Planes of Motion   External rotation at 0°: 3 (immediate pain)   Internal rotation at 0°: 3+ (immediate pain)     Additional Strength Details  RUE strength deferred due to lack of full AROM in all planes, pt is exhibiting <3/5 strength overall    Tests     Right Shoulder   Positive empty can and full can.   Negative drop arm and Hawkin's.         Therapeutic Exercises  (CPT 39289):     1. Pt education, exam findings; shoulder anatomy, RTC pathology, POC    2. cane flexion, x10, hep    3. wall slides (flex/abduction), x10, hep      Therapeutic Exercise Summary: Pt performed these exercises with instruction and SPV.  Provided handout of these exercises for daily HEP.     Access Code: [email or web address]  URL: [email or web address]  Date: 01/21/2025  Prepared by: Cyndi Mcneill    Exercises  - Supine Shoulder Flexion Extension AAROM with Dowel  - 3 x daily - 7 x weekly - 2 sets - 10 reps  - Standing shoulder flexion wall slides  - 3 x daily - 7 x weekly - 2 sets - 10 reps  - Standing Shoulder Abduction Slides at Wall  - 3 x daily - 7 x weekly - 2 sets - 10 reps      Time-based treatments/modalities:    Physical Therapy Timed Treatment Charges  Therapeutic exercise minutes (CPT 44562): 10 minutes      Assessment, Response and Plan:   Impairments: abnormal or restricted ROM, activity intolerance, impaired functional mobility, impaired physical strength, lacks appropriate home exercise program, limited ADL's and pain with function    Assessment details:  Pt is a 79 y.o female presenting to physical therapy with complaints of chronic R shoulder pain. Pt has a PMH of anxiety, asthma, back pain, arrhythmia, depression, fatigue, GERD, HTN, and obesity. Pt demonstrates R shoulder pain consistent with RTC pathology and is displaying the following impairments: (+) empty can, (+) full can, weakness and pain with resisted ER/IR testing at 0 degrees, decreased AROM in all planes during sitting, pt has near full PROM with mild muscle guarding and no increase in pain, TTP over supraspinatus and infraspinatus distal tendonous insertions. Pt (-) drop arm indicating no full tearing at RTC. Pt with reported activity intolerance, painful while sleeping on RUE, and difficulty with ADLs (donning coat, lifting arm OH to reach objects, carry light objects, and self cleaning after using toilet).    Pt educated on  exam findings and future POC, pt acknowledged understanding and is agreeable. Pt will benefit from skilled physical therapy to address the following impairments in order to improve functional mobility and overall QOL.  Pt should progress well towards goals if compliant with HEP and POC.     Barriers to therapy:  Comorbidities  Prognosis: fair    Goals:   Short Term Goals:   1. Pt will report independence and compliance with written HEP   2. Pt will demo R shoulder AROM to WFL in order to improve functional mobility  3. Pt will demo improved RUE IR to WFL in order to don/doff coat with no increase in s/s   Short term goal time span:  2-4 weeks      Long Term Goals:    1. Pt will report independence and compliance with written HEP   2. Pt will demo improved RUE strength to >/= to 4/5 in order to improve functional mobility and ease with ADLs  3. Pt will report >/= to 70% improvement in sleep quality (in relation to R shoulder pain)  4. Pt will demo x10 repetitions of OH lifting with >/= to 8lbs in order to improve functional mobility and ease with ADLs/chores within the home  5. Pt will have significant improvement in QuickDash score with MCID of >/= to 10 points (eval:  )   Long term goal time span:  6-8 weeks    Plan:   Therapy options:  Physical therapy treatment to continue  Planned therapy interventions:  Gait Training (CPT 71912), Neuromuscular Re-education (CPT 82446), Therapeutic Activities (CPT 13425), Therapeutic Exercise (CPT 86748) and Manual Therapy (CPT 67779)  Frequency: 1-2x per week.  Duration in weeks:  8  Discussed with:  Patient  Plan details:  UPOC: 02/21/2024      Functional Assessment Used  Quickdash General Total Score: 61.36     Referring provider co-signature:  I have reviewed this plan of care and my co-signature certifies the need for services.    Certification Period: 01/21/2025 to  02/21/2025    Physician Signature: ________________________________ Date: ______________

## 2025-01-21 ENCOUNTER — PHYSICAL THERAPY (OUTPATIENT)
Dept: PHYSICAL THERAPY | Facility: MEDICAL CENTER | Age: 80
End: 2025-01-21
Attending: FAMILY MEDICINE
Payer: MEDICARE

## 2025-01-21 DIAGNOSIS — M25.511 CHRONIC RIGHT SHOULDER PAIN: ICD-10-CM

## 2025-01-21 DIAGNOSIS — G89.29 CHRONIC RIGHT SHOULDER PAIN: ICD-10-CM

## 2025-01-21 PROCEDURE — 97161 PT EVAL LOW COMPLEX 20 MIN: CPT

## 2025-01-21 PROCEDURE — 97110 THERAPEUTIC EXERCISES: CPT

## 2025-01-21 SDOH — ECONOMIC STABILITY: GENERAL: QUALITY OF LIFE: FAIR

## 2025-01-23 NOTE — OP THERAPY DAILY TREATMENT
Outpatient Physical Therapy  DAILY TREATMENT     [facility] Outpatient Physical Therapy  [address]  [place] [address]  Phone:  [phone]  Fax:  [phone]    Date: 01/24/2025    Patient: Melania Johnson  YOB: 1945  MRN: [id number]     Time Calculation    Start time: 1100  Stop time: 1157 Time Calculation (min): 57 minutes         Chief Complaint: Shoulder Problem    Visit #: 2    SUBJECTIVE:  Pt states that she is doing well. Feels her shoulder is slightly improved with the wall slides.     OBJECTIVE:  Current objective measures:           Therapeutic Exercises (CPT 37795):     1. Pulleys, x4 mins    2. cane flexion, x10, hep    3. wall slides (flex/abduction), x10, hep      Therapeutic Exercise Summary: Pt performed these exercises with instruction and SPV.  Provided handout of these exercises for daily HEP.     Access Code: [email or web address]  URL: [email or web address]  Date: 01/21/2025  Prepared by: Cyndi Mcneill    Exercises  - Supine Shoulder Flexion Extension AAROM with Dowel  - 3 x daily - 7 x weekly - 2 sets - 10 reps  - Standing shoulder flexion wall slides  - 3 x daily - 7 x weekly - 2 sets - 10 reps  - Standing Shoulder Abduction Slides at Wall  - 3 x daily - 7 x weekly - 2 sets - 10 reps    Therapeutic Treatments and Modalities:     1. E Stim Unattended (CPT 34626), x10 min with ice    Time-based treatments/modalities:    Physical Therapy Timed Treatment Charges  Manual therapy minutes (CPT 85031): 10 minutes  Therapeutic exercise minutes (CPT 24265): 30 minutes    ASSESSMENT:   Response to treatment: Pt tolerated today's treatment session fairly. Pt did not tolerated PROM today, focused more on AAROM today. Utilized ESTIM for pain modulation at end of session today due to high pain behavior and limitations.     PLAN/RECOMMENDATIONS:   Plan for treatment: therapy treatment to continue next visit.  Planned interventions for next visit:  continue with current treatment.

## 2025-01-24 ENCOUNTER — PHYSICAL THERAPY (OUTPATIENT)
Dept: PHYSICAL THERAPY | Facility: MEDICAL CENTER | Age: 80
End: 2025-01-24
Attending: FAMILY MEDICINE
Payer: MEDICARE

## 2025-01-24 DIAGNOSIS — M25.511 CHRONIC RIGHT SHOULDER PAIN: ICD-10-CM

## 2025-01-24 DIAGNOSIS — G89.29 CHRONIC RIGHT SHOULDER PAIN: ICD-10-CM

## 2025-01-24 PROCEDURE — 97014 ELECTRIC STIMULATION THERAPY: CPT

## 2025-01-24 PROCEDURE — 97110 THERAPEUTIC EXERCISES: CPT

## 2025-01-24 PROCEDURE — 97140 MANUAL THERAPY 1/> REGIONS: CPT

## 2025-02-03 NOTE — OP THERAPY DAILY TREATMENT
Outpatient Physical Therapy  DAILY TREATMENT     [facility] Outpatient Physical Therapy  [address]  [place] [address]  Phone:  [phone]  Fax:  [phone]    Date: 02/04/2025    Patient: Melania Johnson  YOB: 1945  MRN: [id number]     Time Calculation    Start time: 1230  Stop time: 1330 Time Calculation (min): 60 minutes         Chief Complaint: Shoulder Problem    Visit #: 8    SUBJECTIVE:  Pt states that she is doing well. Notes that she is feeling improvement in her R shoulder ROM and is more so having an ache instead of pain. At end of session reporting hx of vertigo with repeat episode 3 days ago. Was curious if this is something we treat.     OBJECTIVE:  Current objective measures:     R shoulder AROM:  Flexion: 152 degrees  Abduction: 114 degrees  ER (BTH): T4  IR (BTB) T12      Therapeutic Exercises (CPT 39068):     1. UBE, x5 minutes    2. cane flexion, x10, hep    3. wall slides (flex/abduction), x10, hep    4. pulleys, x10      Therapeutic Exercise Summary: Pt performed these exercises with instruction and SPV.  Provided handout of these exercises for daily HEP.     Access Code: [email or web address]  URL: [email or web address]  Date: 01/21/2025  Prepared by: Cyndi Mcneill    Exercises  - Supine Shoulder Flexion Extension AAROM with Dowel  - 3 x daily - 7 x weekly - 2 sets - 10 reps  - Standing shoulder flexion wall slides  - 3 x daily - 7 x weekly - 2 sets - 10 reps  - Standing Shoulder Abduction Slides at Wall  - 3 x daily - 7 x weekly - 2 sets - 10 reps    Therapeutic Treatments and Modalities:     1. E Stim Unattended (CPT 73285), x10 min with ice    2. Manual Therapy (CPT 68727), x 10 mins PROM into all ranges with LAD    Time-based treatments/modalities:    Physical Therapy Timed Treatment Charges  Manual therapy minutes (CPT 75896): 15 minutes  Therapeutic exercise minutes (CPT 43019): 28 minutes    ASSESSMENT:   Response to treatment: Pt  tolerated today's treatment session very well with no increase in s/s. Pt had vast improvement in seated RUE AROM today with flexion and abduction following PROM with long axis distraction. Utilized ESTIM for pain modulation at end of session today due to high pain behavior and limitations. Due to pt hx of vertigo and past success with eply maneuver, plan for dx hallpike and potential eply maneuver next session. Advised pt to bring a  incase she leaves feeling if maneuver is performed.     PLAN/RECOMMENDATIONS:   Plan for treatment: therapy treatment to continue next visit.  Planned interventions for next visit: continue with current treatment.

## 2025-02-04 ENCOUNTER — PHYSICAL THERAPY (OUTPATIENT)
Dept: PHYSICAL THERAPY | Facility: MEDICAL CENTER | Age: 80
End: 2025-02-04
Attending: FAMILY MEDICINE
Payer: MEDICARE

## 2025-02-04 DIAGNOSIS — M25.511 CHRONIC RIGHT SHOULDER PAIN: ICD-10-CM

## 2025-02-04 DIAGNOSIS — G89.29 CHRONIC RIGHT SHOULDER PAIN: ICD-10-CM

## 2025-02-04 PROCEDURE — 97140 MANUAL THERAPY 1/> REGIONS: CPT

## 2025-02-04 PROCEDURE — 97014 ELECTRIC STIMULATION THERAPY: CPT

## 2025-02-04 PROCEDURE — 97110 THERAPEUTIC EXERCISES: CPT

## 2025-02-04 PROCEDURE — RXMED WILLOW AMBULATORY MEDICATION CHARGE: Performed by: FAMILY MEDICINE

## 2025-02-06 ENCOUNTER — PHYSICAL THERAPY (OUTPATIENT)
Dept: PHYSICAL THERAPY | Facility: MEDICAL CENTER | Age: 80
End: 2025-02-06
Attending: FAMILY MEDICINE
Payer: MEDICARE

## 2025-02-06 ENCOUNTER — PHARMACY VISIT (OUTPATIENT)
Dept: PHARMACY | Facility: MEDICAL CENTER | Age: 80
End: 2025-02-06
Payer: COMMERCIAL

## 2025-02-06 DIAGNOSIS — M25.511 CHRONIC RIGHT SHOULDER PAIN: ICD-10-CM

## 2025-02-06 DIAGNOSIS — G89.29 CHRONIC RIGHT SHOULDER PAIN: ICD-10-CM

## 2025-02-06 PROCEDURE — 97110 THERAPEUTIC EXERCISES: CPT

## 2025-02-06 PROCEDURE — RXMED WILLOW AMBULATORY MEDICATION CHARGE: Performed by: FAMILY MEDICINE

## 2025-02-06 PROCEDURE — 97140 MANUAL THERAPY 1/> REGIONS: CPT

## 2025-02-06 NOTE — OP THERAPY DAILY TREATMENT
"  Outpatient Physical Therapy  DAILY TREATMENT     [facility] Outpatient Physical Therapy  [address]  [place] [address]  Phone:  [phone]  Fax:  [phone]    Date: 02/06/2025    Patient: Melania Johnson  YOB: 1945  MRN: [id number]     Time Calculation    Start time: 1345  Stop time: 1425 Time Calculation (min): 40 minutes         Chief Complaint: Shoulder Problem    Visit #: 9    SUBJECTIVE:   Pt states that she is doing well today, is still sore from last session.    OBJECTIVE:  Current objective measures:           Therapeutic Exercises (CPT 96202):     1. UBE, x5 minutes    2. cane flexion, x10, hep    3. wall slides (flex/abduction), x10, hep    4. pulleys, x10    5. cane extension/IR, x10 ea    6. pec stretch, 2x 30\"      Therapeutic Exercise Summary: Pt performed these exercises with instruction and SPV.  Provided handout of these exercises for daily HEP.     Access Code: [email or web address]  URL: [email or web address]  Date: 01/21/2025  Prepared by: Cyndi Mcneill    Exercises  - Supine Shoulder Flexion Extension AAROM with Dowel  - 3 x daily - 7 x weekly - 2 sets - 10 reps  - Standing shoulder flexion wall slides  - 3 x daily - 7 x weekly - 2 sets - 10 reps  - Standing Shoulder Abduction Slides at Wall  - 3 x daily - 7 x weekly - 2 sets - 10 reps    Therapeutic Treatments and Modalities:     1. E Stim Unattended (CPT 71052), x10 min with ice    2. Manual Therapy (CPT 27249), x 10 mins PROM into all ranges with LAD    Time-based treatments/modalities:    Physical Therapy Timed Treatment Charges  Manual therapy minutes (CPT 99031): 10 minutes  Therapeutic exercise minutes (CPT 04681): 30 minutes      Pain rating (1-10) before treatment:  2  Pain rating (1-10) after treatment:  1    ASSESSMENT:   Response to treatment:  Pt tolerated today's treatment session very well with no increase in s/s. Pt had vast improvement in seated RUE AROM today with " flexion and abduction following PROM with long axis distraction. Tolerated new additions well with only slight increase in soreness, but was alleviated during pec stretch.     PLAN/RECOMMENDATIONS:   Plan for treatment: therapy treatment to continue next visit.  Planned interventions for next visit: continue with current treatment.

## 2025-02-11 NOTE — OP THERAPY DAILY TREATMENT
"  Outpatient Physical Therapy  DAILY TREATMENT     [facility] Outpatient Physical Therapy  [address]  [place] [address]  Phone:  [phone]  Fax:  [phone]    Date: 02/12/2025    Patient: Melania Johnson  YOB: 1945  MRN: [id number]     Time Calculation    Start time: 1100  Stop time: 1142 Time Calculation (min): 42 minutes         Chief Complaint: Shoulder Problem    Visit #: 5    SUBJECTIVE:  Pt states that she is having increased pain today due to sleeping weird on her shoulder. Pt also reports that she has been bleeding through her nose the past two nights while wearing her CPAP, \"kind of a lot of blood\".    OBJECTIVE:  Current objective measures:           Therapeutic Exercises (CPT 37371):     1. UBE, x5 minutes    2. cane flexion, x10, hep    3. wall slides (flex/abduction), x10, hep    4. pulleys, x10    5. cane extension/IR, x10 ea    6. pec stretch, 2x 30\"    7. table slides, x10 flex/scaption, inclined table to replicate fwd reach while painting      Therapeutic Exercise Summary: Pt performed these exercises with instruction and SPV.  Provided handout of these exercises for daily HEP.     Access Code: [email or web address]  URL: [email or web address]  Date: 01/21/2025  Prepared by: Cyndi Mcneill    Exercises  - Supine Shoulder Flexion Extension AAROM with Dowel  - 3 x daily - 7 x weekly - 2 sets - 10 reps  - Standing shoulder flexion wall slides  - 3 x daily - 7 x weekly - 2 sets - 10 reps  - Standing Shoulder Abduction Slides at Wall  - 3 x daily - 7 x weekly - 2 sets - 10 reps    Therapeutic Treatments and Modalities:     1. E Stim Unattended (CPT 37406), x10 min with ice    2. Manual Therapy (CPT 98867), x 10 mins PROM into all ranges with LAD    Time-based treatments/modalities:    Physical Therapy Timed Treatment Charges  Manual therapy minutes (CPT 81205): 15 minutes  Therapeutic exercise minutes (CPT 45297): 27 minutes      ASSESSMENT: "   Response to treatment: Pt tolerated today's treatment session very well with no increase in s/s. Pt tolerated new table slides well in a standing inclined position while maintaining some elbow contact for scapular offloading to imitate extended position pt has to maintain while painting. Tolerated new additions well with only slight increase in soreness, but was alleviated during pec stretch.     PLAN/RECOMMENDATIONS:   Plan for treatment: therapy treatment to continue next visit.  Planned interventions for next visit: continue with current treatment.

## 2025-02-12 ENCOUNTER — PHYSICAL THERAPY (OUTPATIENT)
Dept: PHYSICAL THERAPY | Facility: MEDICAL CENTER | Age: 80
End: 2025-02-12
Attending: FAMILY MEDICINE
Payer: MEDICARE

## 2025-02-12 DIAGNOSIS — G89.29 CHRONIC RIGHT SHOULDER PAIN: ICD-10-CM

## 2025-02-12 DIAGNOSIS — M25.511 CHRONIC RIGHT SHOULDER PAIN: ICD-10-CM

## 2025-02-12 PROCEDURE — 97140 MANUAL THERAPY 1/> REGIONS: CPT

## 2025-02-12 PROCEDURE — 97110 THERAPEUTIC EXERCISES: CPT

## 2025-02-13 ENCOUNTER — PATIENT MESSAGE (OUTPATIENT)
Dept: SLEEP MEDICINE | Facility: MEDICAL CENTER | Age: 80
End: 2025-02-13
Payer: MEDICARE

## 2025-02-13 DIAGNOSIS — G47.33 OSA (OBSTRUCTIVE SLEEP APNEA): ICD-10-CM

## 2025-02-14 NOTE — TELEPHONE ENCOUNTER
Order faxed to [facility].    Pressure adjustments were able to be made wirelessly.

## 2025-02-14 NOTE — PROGRESS NOTES
Reviewed current situation with patient, attempted to make pressure adjustment wirelessly to assist with minimizing mask leak and improving tolerance.  Follow-up with [facility] as planned regarding XS mask hybrid fullface and bring device to have checked for new pressure settings.  We will fax those over to [facility] tomorrow.  Switch to nonheated tubing and assess for benefit.  May benefit from use of CPAP mask liner.  If necessary to skip BiPAP use because of nasal irritation/bleeding consider sleeping in recliner for night in order to sleep elevated although this is not ideal.  Short-term follow-up advised in clinic to review, call to schedule appointment.  Patient states understanding of plan.

## 2025-02-17 NOTE — OP THERAPY DAILY TREATMENT
"  Outpatient Physical Therapy  DAILY TREATMENT     [facility] Outpatient Physical Therapy  [address]  [place] [address]  Phone:  [phone]  Fax:  [phone]    Date: 02/18/2025    Patient: Melania Johnson  YOB: 1945  MRN: [id number]     Time Calculation    Start time: 1230  Stop time: 1324 Time Calculation (min): 54 minutes         Chief Complaint: Shoulder Problem    Visit #: 11    SUBJECTIVE:  Pt states that she is doing well, notes that she painted with her panting on the easel for a few hours today with only minimal soreness afterwards. Pt discussed how she has been a little sad over the weekend due to circumstances with her husbands behavior at [facility], noting that the memory care office keeps calling her due to his behavior and it is really troubling her.     OBJECTIVE:  Current objective measures:          Therapeutic Exercises (CPT 64141):     1. UBE, x5 minutes    2. cane flexion, x10, hep    3. wall slides (flex/abduction), x10, hep    4. pulleys, x10    5. cane extension/IR, x10 ea    6. pec stretch, 2x 30\"    7. table slides, x10 flex/scaption, inclined table to replicate fwd reach while painting    8. towel IR stretch, x4      Therapeutic Exercise Summary: Pt performed these exercises with instruction and SPV.  Provided handout of these exercises for daily HEP.     Access Code: [email or web address]  URL: [email or web address]  Date: 01/21/2025  Prepared by: Cyndi Mcneill    Exercises  - Supine Shoulder Flexion Extension AAROM with Dowel  - 3 x daily - 7 x weekly - 2 sets - 10 reps  - Standing shoulder flexion wall slides  - 3 x daily - 7 x weekly - 2 sets - 10 reps  - Standing Shoulder Abduction Slides at Wall  - 3 x daily - 7 x weekly - 2 sets - 10 reps    Therapeutic Treatments and Modalities:     1. E Stim Unattended (CPT 73866), x10 min with ice    2. Manual Therapy (CPT 75815), x 10 mins PROM into all ranges with LAD    Time-based " treatments/modalities:      ASSESSMENT:   Response to treatment: Pt tolerated today's treatment session well and is demonstrating improved tolerance with AROM in all planes other than IR BTB that still provides difficulty and reproduction of s/s that pt is having outside of therapy. Plan to add in gentle periscapular strengthening next session. Also plan to perform Adam Hallpike/Eply maneuver next session as pt continues to express dizziness while lying back.     PLAN/RECOMMENDATIONS:   Plan for treatment: therapy treatment to continue next visit.  Planned interventions for next visit: continue with current treatment.

## 2025-02-18 ENCOUNTER — PHYSICAL THERAPY (OUTPATIENT)
Dept: PHYSICAL THERAPY | Facility: MEDICAL CENTER | Age: 80
End: 2025-02-18
Attending: FAMILY MEDICINE
Payer: MEDICARE

## 2025-02-18 DIAGNOSIS — G89.29 CHRONIC RIGHT SHOULDER PAIN: ICD-10-CM

## 2025-02-18 DIAGNOSIS — M25.511 CHRONIC RIGHT SHOULDER PAIN: ICD-10-CM

## 2025-02-18 PROCEDURE — 97014 ELECTRIC STIMULATION THERAPY: CPT

## 2025-02-18 PROCEDURE — 97140 MANUAL THERAPY 1/> REGIONS: CPT

## 2025-02-18 PROCEDURE — 97110 THERAPEUTIC EXERCISES: CPT

## 2025-02-20 ENCOUNTER — PHYSICAL THERAPY (OUTPATIENT)
Dept: PHYSICAL THERAPY | Facility: MEDICAL CENTER | Age: 80
End: 2025-02-20
Attending: FAMILY MEDICINE
Payer: MEDICARE

## 2025-02-20 DIAGNOSIS — G89.29 CHRONIC RIGHT SHOULDER PAIN: ICD-10-CM

## 2025-02-20 DIAGNOSIS — M25.511 CHRONIC RIGHT SHOULDER PAIN: ICD-10-CM

## 2025-02-20 PROCEDURE — 97140 MANUAL THERAPY 1/> REGIONS: CPT

## 2025-02-20 PROCEDURE — 95992 CANALITH REPOSITIONING PROC: CPT | Mod: 59

## 2025-02-20 PROCEDURE — 97110 THERAPEUTIC EXERCISES: CPT

## 2025-02-20 NOTE — OP THERAPY DAILY TREATMENT
"  Outpatient Physical Therapy  DAILY TREATMENT     [facility] Outpatient Physical Therapy  [address]  [place] [address]  Phone:  [phone]  Fax:  [phone]    Date: 02/20/2025    Patient: Melania Johnson  YOB: 1945  MRN: [id number]     Time Calculation                   Chief Complaint: No chief complaint on file.    Visit #: 12    SUBJECTIVE:  Pt reports she wants to try the vertigo testing today as she is still having some dizziness with lying down in bed.     OBJECTIVE:  Current objective measures:           Therapeutic Exercises (CPT 95460):     1. UBE, x5 minutes    2. cane flexion, x10, hep    3. wall slides (flex/abduction), x10, hep    4. pulleys, x10    5. cane extension/IR, x10 ea    6. pec stretch, 2x 30\"    7. table slides, x10 flex/scaption, inclined table to replicate fwd reach while painting    8. towel IR stretch, x4      Therapeutic Exercise Summary: Pt performed these exercises with instruction and SPV.  Provided handout of these exercises for daily HEP.     Access Code: [email or web address]  URL: [email or web address]  Date: 01/21/2025  Prepared by: Cyndi Mcneill    Exercises  - Supine Shoulder Flexion Extension AAROM with Dowel  - 3 x daily - 7 x weekly - 2 sets - 10 reps  - Standing shoulder flexion wall slides  - 3 x daily - 7 x weekly - 2 sets - 10 reps  - Standing Shoulder Abduction Slides at Wall  - 3 x daily - 7 x weekly - 2 sets - 10 reps    Therapeutic Treatments and Modalities:     2. Manual Therapy (CPT 86377), x 10 mins PROM into all ranges with LAD    3. Canalith Repositioning (CPT 13724), x10 minutes; positive R BPPV , Eply manuever performed x2 with reported no s/s during second trial, pt left feeling slightly nauseous and opted to sit in the waiting room for 5 minutes prior to leaving    Time-based treatments/modalities:           ASSESSMENT:   Response to treatment: Pt tolerated today's treatment well with no increase in " "s/s at R shoulder. Pt continues to demo greatest limitation with R IR BTB, however tolerated scapular distraction well. Will continue this in next session to improve scapular mobility and improve AROM.     Pt with positive R ramses hallpike today with horizontal nystagmus lasting 5 seconds, pt reporting \"room spinning\". Pt was taken through 2 rounds of Eply Maneuver with good tolerance. Short episode of nausea noted which pt allowed for recovery in waiting room prior to leaving. Will follow up with pt next session on this. May repeat as needed.     PLAN/RECOMMENDATIONS:   Plan for treatment: therapy treatment to continue next visit.  Planned interventions for next visit: continue with current treatment.         "

## 2025-02-21 ENCOUNTER — TELEPHONE (OUTPATIENT)
Dept: HEALTH INFORMATION MANAGEMENT | Facility: OTHER | Age: 80
End: 2025-02-21
Payer: MEDICARE

## 2025-02-25 ENCOUNTER — APPOINTMENT (OUTPATIENT)
Dept: PHYSICAL THERAPY | Facility: MEDICAL CENTER | Age: 80
End: 2025-02-25
Attending: FAMILY MEDICINE
Payer: MEDICARE

## 2025-02-25 DIAGNOSIS — G89.29 CHRONIC RIGHT SHOULDER PAIN: ICD-10-CM

## 2025-02-25 DIAGNOSIS — M25.511 CHRONIC RIGHT SHOULDER PAIN: ICD-10-CM

## 2025-02-25 PROCEDURE — 97140 MANUAL THERAPY 1/> REGIONS: CPT

## 2025-02-25 PROCEDURE — 97110 THERAPEUTIC EXERCISES: CPT

## 2025-02-25 NOTE — OP THERAPY DAILY TREATMENT
"  Outpatient Physical Therapy  DAILY TREATMENT     [facility] Outpatient Physical Therapy  [address]  [place] [address]  Phone:  [phone]  Fax:  [phone]    Date: 02/25/2025    Patient: Melania Johnson  YOB: 1945  MRN: [id number]     Time Calculation    Start time: 1230  Stop time: 1310 Time Calculation (min): 40 minutes         Chief Complaint: Shoulder Problem    Visit #: 8    SUBJECTIVE:  Pt states that she is doing well. No new complaints. Was able to clean her house yesterday with no issue.     OBJECTIVE:  Current objective measures:           Therapeutic Exercises (CPT 52697):     1. UBE, x5 minutes    2. cane flexion, x10, hep    3. wall slides (flex/abduction), x10, hep    4. pulleys, x10    5. cane extension/IR, x10 ea    6. pec stretch, 2x 30\"    7. table slides, x10 flex/scaption, inclined table to replicate fwd reach while painting    8. towel IR stretch, x4    9. rows, 2x 10 BTB    10. scaption, 2x 10 OTB      Therapeutic Exercise Summary: Pt performed these exercises with instruction and SPV.  Provided handout of these exercises for daily HEP.     Access Code: [email or web address]  URL: [email or web address]  Date: 01/21/2025  Prepared by: Cyndi Mcneill    Exercises  - Supine Shoulder Flexion Extension AAROM with Dowel  - 3 x daily - 7 x weekly - 2 sets - 10 reps  - Standing shoulder flexion wall slides  - 3 x daily - 7 x weekly - 2 sets - 10 reps  - Standing Shoulder Abduction Slides at Wall  - 3 x daily - 7 x weekly - 2 sets - 10 reps    Therapeutic Treatments and Modalities:     2. Manual Therapy (CPT 25898), x 10 mins PROM into all ranges with LAD    3. Canalith Repositioning (CPT 76378), x10 minutes; positive R BPPV , Eply manuever performed x2 with reported no s/s during second trial, pt left feeling slightly nauseous and opted to sit in the waiting room for 5 minutes prior to leaving    Time-based treatments/modalities:    Physical " Subjective:       Patient ID: Rae Jordan Therapy Timed Treatment Charges  Manual therapy minutes (CPT 61418): 15 minutes  Therapeutic exercise minutes (CPT 46338): 25 minutes    ASSESSMENT:   Response to treatment: Pt tolerated today's treatment well with no increase in s/s at R shoulder. Pt continues to demo greatest limitation with R IR BTB, however tolerated scapular distraction well and showed some improvement in reaching BTB today. Addition of gentle strengthening was also tolerated well with no increase in pain. Will continue this in next session to improve scapular mobility and improve AROM.       PLAN/RECOMMENDATIONS:   Plan for treatment: therapy treatment to continue next visit.  Planned interventions for next visit: continue with current treatment.          

## 2025-02-26 NOTE — OP THERAPY DAILY TREATMENT
"  Outpatient Physical Therapy  DAILY TREATMENT     [facility]  [address]  [place] [address]  Phone:  [phone]  Fax:  [phone]    Date: 02/27/2025    Patient: Melania Johnson  YOB: 1945  MRN: [id number]     Time Calculation                   Chief Complaint: No chief complaint on file.    Visit #: 13    SUBJECTIVE:  Pt states    OBJECTIVE:  Current objective measures:           Therapeutic Exercises (CPT 47836):     1. UBE, x5 minutes    2. cane flexion, x10, hep    3. wall slides (flex/abduction), x10, hep    4. pulleys, x10    5. cane extension/IR, x10 ea    6. pec stretch, 2x 30\"    7. table slides, x10 flex/scaption, inclined table to replicate fwd reach while painting    8. towel IR stretch, x4    9. rows, 2x 10 BTB    10. scaption, 2x 10 OTB      Therapeutic Exercise Summary: Pt performed these exercises with instruction and SPV.  Provided handout of these exercises for daily HEP.     Access Code: [email or web address]  URL: [email or web address]  Date: 01/21/2025  Prepared by: Cyndi Mcneill    Exercises  - Supine Shoulder Flexion Extension AAROM with Dowel  - 3 x daily - 7 x weekly - 2 sets - 10 reps  - Standing shoulder flexion wall slides  - 3 x daily - 7 x weekly - 2 sets - 10 reps  - Standing Shoulder Abduction Slides at Wall  - 3 x daily - 7 x weekly - 2 sets - 10 reps    Therapeutic Treatments and Modalities:     2. Manual Therapy (CPT 03101), x 10 mins PROM into all ranges with LAD    3. Canalith Repositioning (CPT 10589), x10 minutes; positive R BPPV , Eply manuever performed x2 with reported no s/s during second trial, pt left feeling slightly nauseous and opted to sit in the waiting room for 5 minutes prior to leaving    Time-based treatments/modalities:           Pain rating (1-10) before treatment:  {PAIN NUMBERS_1-10:47048}  Pain rating (1-10) after treatment:  {PAIN NUMBERS_1-10:17550}    ASSESSMENT: "   Response to treatment:     Pt tolerated today's treatment well with no increase in s/s at R shoulder. Pt continues to demo greatest limitation with R IR BTB, however tolerated scapular distraction well and showed some improvement in reaching BTB today. Addition of gentle strengthening was also tolerated well with no increase in pain. Will continue this in next session to improve scapular mobility and improve AROM.     PLAN/RECOMMENDATIONS:   Plan for treatment: therapy treatment to continue next visit.  Planned interventions for next visit: continue with current treatment.

## 2025-02-27 ENCOUNTER — APPOINTMENT (OUTPATIENT)
Dept: PHYSICAL THERAPY | Facility: MEDICAL CENTER | Age: 80
End: 2025-02-27
Payer: MEDICARE

## 2025-02-27 ENCOUNTER — APPOINTMENT (OUTPATIENT)
Dept: PHYSICAL THERAPY | Facility: MEDICAL CENTER | Age: 80
End: 2025-02-27
Attending: FAMILY MEDICINE
Payer: MEDICARE

## 2025-03-03 NOTE — OP THERAPY DAILY TREATMENT
"  Outpatient Physical Therapy  DAILY TREATMENT     [facility]  [address]  [place] [address]  Phone:  [phone]  Fax:  [phone]    Date: 03/04/2025    Patient: Melania Johnson  YOB: 1945  MRN: [id number]     Time Calculation    Start time: 1230  Stop time: 1315 Time Calculation (min): 45 minutes         Chief Complaint: Shoulder Problem    Visit #: 13    SUBJECTIVE:  Pt states that her shoulder is sore today. Is really struggling with her husbands behavior and is finding she is more depressed than usual. Does attend therapy 3x per month to talk about this. Would like to decrease frequency to offload schedule.     OBJECTIVE:  Current objective measures:           Therapeutic Exercises (CPT 54764):     1. UBE, x5 minutes    2. cane flexion, x10, hep    3. wall slides (flex/abduction), x10, hep    4. pulleys, x10    5. cane extension/IR, x10 ea    6. pec stretch, 2x 30\"    7. table slides, x10 flex/scaption, inclined table to replicate fwd reach while painting    8. towel IR stretch, x4    9. rows, 2x 10 BTB    10. scaption, 2x 10 OTB      Therapeutic Exercise Summary: Pt performed these exercises with instruction and SPV.  Provided handout of these exercises for daily HEP.     Access Code: [email or web address]  URL: [email or web address]  Date: 01/21/2025  Prepared by: Cyndi Mcneill    Exercises  - Supine Shoulder Flexion Extension AAROM with Dowel  - 3 x daily - 7 x weekly - 2 sets - 10 reps  - Standing shoulder flexion wall slides  - 3 x daily - 7 x weekly - 2 sets - 10 reps  - Standing Shoulder Abduction Slides at Wall  - 3 x daily - 7 x weekly - 2 sets - 10 reps    Therapeutic Treatments and Modalities:     2. Manual Therapy (CPT 49157), x 10 mins PROM into all ranges with LAD    Time-based treatments/modalities:    Physical Therapy Timed Treatment Charges  Manual therapy minutes (CPT 50073): 10 minutes  Therapeutic exercise " minutes (CPT 04644): 30 minutes    ASSESSMENT:   Response to treatment: Pt tolerated today's treatment fairly well with no increase in s/s at R shoulder. Pt became very tearful during session today due to external family circumstances. Pt and therapist navigated through this conversation together, establishing that pt is seeking appropriate care for depression s/s and is attending talk therapy with a psychiatrist 3x per month. Discussed reducing frequency to allow pt for increased time to perform necessary appointments, allow for time for herself, and time to visit family. Pt will follow up 2x per month as needed for continued shoulder pain and mobility deficits.   PLAN/RECOMMENDATIONS:   Plan for treatment: therapy treatment to continue next visit.  Planned interventions for next visit: continue with current treatment.

## 2025-03-04 ENCOUNTER — PHYSICAL THERAPY (OUTPATIENT)
Dept: PHYSICAL THERAPY | Facility: MEDICAL CENTER | Age: 80
End: 2025-03-04
Attending: FAMILY MEDICINE
Payer: MEDICARE

## 2025-03-04 DIAGNOSIS — G89.29 CHRONIC RIGHT SHOULDER PAIN: ICD-10-CM

## 2025-03-04 DIAGNOSIS — M25.511 CHRONIC RIGHT SHOULDER PAIN: ICD-10-CM

## 2025-03-04 PROCEDURE — 97110 THERAPEUTIC EXERCISES: CPT

## 2025-03-04 PROCEDURE — 97140 MANUAL THERAPY 1/> REGIONS: CPT

## 2025-03-05 ENCOUNTER — OFFICE VISIT (OUTPATIENT)
Dept: SLEEP MEDICINE | Facility: MEDICAL CENTER | Age: 80
End: 2025-03-05
Attending: STUDENT IN AN ORGANIZED HEALTH CARE EDUCATION/TRAINING PROGRAM
Payer: MEDICARE

## 2025-03-05 VITALS
HEIGHT: 64 IN | BODY MASS INDEX: 31.58 KG/M2 | DIASTOLIC BLOOD PRESSURE: 60 MMHG | SYSTOLIC BLOOD PRESSURE: 126 MMHG | RESPIRATION RATE: 16 BRPM | OXYGEN SATURATION: 95 % | HEART RATE: 57 BPM | WEIGHT: 185 LBS

## 2025-03-05 DIAGNOSIS — G47.33 OSA (OBSTRUCTIVE SLEEP APNEA): ICD-10-CM

## 2025-03-05 PROCEDURE — 3078F DIAST BP <80 MM HG: CPT | Performed by: STUDENT IN AN ORGANIZED HEALTH CARE EDUCATION/TRAINING PROGRAM

## 2025-03-05 PROCEDURE — 99214 OFFICE O/P EST MOD 30 MIN: CPT | Performed by: STUDENT IN AN ORGANIZED HEALTH CARE EDUCATION/TRAINING PROGRAM

## 2025-03-05 PROCEDURE — 3074F SYST BP LT 130 MM HG: CPT | Performed by: STUDENT IN AN ORGANIZED HEALTH CARE EDUCATION/TRAINING PROGRAM

## 2025-03-05 PROCEDURE — 99213 OFFICE O/P EST LOW 20 MIN: CPT | Performed by: STUDENT IN AN ORGANIZED HEALTH CARE EDUCATION/TRAINING PROGRAM

## 2025-03-05 ASSESSMENT — FIBROSIS 4 INDEX: FIB4 SCORE: 2.1

## 2025-03-05 NOTE — PROGRESS NOTES
[facility] Follow-up Visit    CC: LIZETH      HPI:  Jackie Johnson is a 79 y.o.female  with HTN, SVT, GERD, depressino, asthma, LIZETH with nocturnal hypoxemia on BiPAP with supplemental oxygen who presents for LIZETH follow-up     Last seen by me 1/7/2025 for BiPAP compliance.  Air curve 10 via auto 18/9, PS: 4 cmH2O.  Time used 7 hours and 48 minutes on average.  Was trying various masks. Residual AHI of 1.3.  Noted at that time to have difficulty with mask seal.  She also had a tough winter as she has been sick for most of it initially with COVID and then developed bacterial pneumonia.  She is also been dealing with a lot of stress this year due to placing her  in memory care.  DME mask fitting to try F30I or nasal interface with chinstrap was ordered.  Also slightly increased EPAP to 12 cmH2O given mild hypoxemia on OPO and therapy.    Has had difficulty with mask fitting. Now wearing XS mask hybrid fullface and bring device to have checked for new pressure settings.   She states she does not like the hybrid fullface mask interfaces.  She would like to go back to nasal cradle interface with chinstrap versus mouth tape.  She does note a difference when she is wearing BiPAP and does feel better during the day after wearing it.  She is motivated to continue to wear BiPAP and try various nasal mask interfaces.    Machine: Air curve 10 auto BiPAP  New pressure settings: 16/10, pressure support 4  Date: 2/3/2025 to 3/4/2025  Time used: 7 hours 45 minutes, 93% days used over 4 hours.  Mask: hybrid FFM  DME: isleep  95th% Leak: 27.3  95th% pressure: 16.1/12.1  Residual AHI: 2.4    Dry mouth: Yes  Mask leak: Yes      Sleep History  10/4/2023 HST WatchPAT study showed a 4% AHI of 44.7 events an hour, minimum oxygen saturation of 71%, time below 88% saturation was 111 minutes.  11/5/2023 PSG titration study showed patient did have improvement in respiratory events with CPAP and BiPAP.  She was recommended she try  auto BiPAP EPAP 16 IPAP 22 pressure support 4 with bleed and supplemental oxygen 2 L/min  OPO 11/1/2024 - on BiPAP 18/9 with PS: 4 on  2LPM, avg low sat: 89.9%, 11.9 min <88% -thus O2 was increased to 2.5 LPM    Patient Active Problem List    Diagnosis Date Noted    COVID-19 09/20/2024    LIZETH (obstructive sleep apnea) 02/21/2024    Primary osteoarthritis of both hands 02/21/2024    Obesity (BMI 30-39.9) 10/06/2023    Atherosclerosis 10/06/2023    Diarrhea 09/08/2023    BMI 32.0-32.9,adult 09/08/2023    Irritable bowel syndrome 07/14/2023    Asthma 07/11/2022    Essential hypertension 01/31/2020    Hyperlipidemia 05/15/2018    Major depressive disorder, recurrent episode (HCC) 09/30/2014    Bilateral primary osteoarthritis of knee 03/13/2008    Supraventricular tachycardia, paroxysmal (HCC) 10/31/2007    GERD (gastroesophageal reflux disease) 10/31/2007       Past Medical History:   Diagnosis Date    Allergy     Anxiety     Arthritis     Asthma     Back pain     Back pain     Bronchitis     Cardiac arrhythmia     Chest pressure 07/11/2022    Chickenpox     Constipation     Cough     Depression     Dizziness     Dysrhythmia     Fatigue     GERD (gastroesophageal reflux disease)     Hair loss 10/27/2021    History of nonmelanoma skin cancer 03/21/2018    Formatting of this note might be different from the original. 2016 right upper arm squamous cell carcinoma status post edc.  Plan: yearly dermatology exam    Hypertension     Mumps     Nasal drainage     Obesity     Painful joint     Ringing in ears     Tonsillitis     Vaginal Papanicolaou smear not required due to history of hysterectomy 02/28/2013    Formatting of this note might be different from the original. Cervix is surgically absent        Past Surgical History:   Procedure Laterality Date    ABDOMINAL HYSTERECTOMY TOTAL      ARTHROPLASTY      ARTHROSCOPY, KNEE      CARPAL TUNNEL RELEASE      EYE SURGERY      OTHER ORTHOPEDIC SURGERY Bilateral     knee  replacements    MT REMV 2ND CATARACT,CORN-SCLER SECTN      TONSILLECTOMY      TUBAL COAGULATION LAPAROSCOPIC BILATERAL         Family History   Problem Relation Age of Onset    Cancer Father         Prostate    Diabetes Father     Heart Disease Father     Prostate cancer Father     Colorectal Cancer Maternal Grandfather     Glaucoma Maternal Grandfather     Colon Cancer Maternal Grandfather     Heart Disease Maternal Grandmother        Social History     Socioeconomic History    Marital status:      Spouse name: Not on file    Number of children: Not on file    Years of education: Not on file    Highest education level: 12th grade   Occupational History    Not on file   Tobacco Use    Smoking status: Former     Current packs/day: 0.00     Average packs/day: 1 pack/day for 56.0 years (56.0 ttl pk-yrs)     Types: Cigarettes     Start date: 10/5/1964     Quit date: 10/10/1985     Years since quittin.4    Smokeless tobacco: Never   Vaping Use    Vaping status: Never Used   Substance and Sexual Activity    Alcohol use: Yes     Alcohol/week: 4.2 oz     Types: 7 Glasses of wine per week     Comment: occ    Drug use: No    Sexual activity: Yes     Partners: Male     Birth control/protection: Female Sterilization, Post-Menopausal   Other Topics Concern    Not on file   Social History Narrative    Not on file     Social Drivers of Health     Financial Resource Strain: Patient Declined (3/7/2023)    Overall Financial Resource Strain (CARDIA)     Difficulty of Paying Living Expenses: Patient declined   Food Insecurity: No Food Insecurity (3/7/2023)    Hunger Vital Sign     Worried About Running Out of Food in the Last Year: Never true     Ran Out of Food in the Last Year: Never true   Transportation Needs: Unmet Transportation Needs (3/7/2023)    PRAPARE - Transportation     Lack of Transportation (Medical): Yes     Lack of Transportation (Non-Medical): No   Physical Activity: Sufficiently Active (3/7/2023)     Exercise Vital Sign     Days of Exercise per Week: 5 days     Minutes of Exercise per Session: 50 min   Stress: Stress Concern Present (3/7/2023)    [employer] [place] [employer] - Occupational Stress Questionnaire     Feeling of Stress : To some extent   Social Connections: Moderately Integrated (3/7/2023)    Social Connection and Isolation Panel [NHANES]     Frequency of Communication with Friends and Family: More than three times a week     Frequency of Social Gatherings with Friends and Family: More than three times a week     Attends Yazdanism Services: Never     Active Member of Clubs or Organizations: Yes     Attends Club or Organization Meetings: 1 to 4 times per year     Marital Status:    Intimate Partner Violence: Not on file   Housing Stability: Low Risk  (3/7/2023)    Housing Stability Vital Sign     Unable to Pay for Housing in the Last Year: No     Number of Places Lived in the Last Year: 1     Unstable Housing in the Last Year: No       Current Outpatient Medications   Medication Sig Dispense Refill    sertraline (ZOLOFT) 100 MG Tab Take 1 tablet by mouth every day 100 Tablet 3    beclomethasone HFA (QVAR REDIHALER) 40 MCG/ACT inhaler Inhale 1 Puff 2 times a day. 10.6 g 3    benzonatate (TESSALON) 100 MG Cap Take 1 Capsule by mouth 3 times a day as needed for Cough. 30 Capsule 3    albuterol 108 (90 Base) MCG/ACT Aero Soln inhalation aerosol Inhale 1 to 2 puffs by mouth every 4 to 6 hours as needed for wheezing and cough 18 g 2    traZODone (DESYREL) 50 MG Tab Take 1/2 tab (cut the tablet in half) by mouth before bed take the 2nd half if not able to sleep 1 hour after first dose. 90 Tablet 3    simvastatin (ZOCOR) 40 MG Tab Take 1 Tablet by mouth every evening. 100 Tablet 3    atenolol (TENORMIN) 50 MG Tab Take 1 Tablet by mouth every day. 100 Tablet 3    omeprazole (PRILOSEC) 20 MG delayed-release capsule Take 2 Capsules by mouth every day. 200 Capsule 3    ezetimibe (ZETIA) 10 MG  "Tab Take 1 Tablet by mouth every day. 100 Tablet 2    methylPREDNISolone (MEDROL DOSEPAK) 4 MG Tablet Therapy Pack As directed on the packaging label. (Patient not taking: Reported on 3/5/2025) 21 Tablet 0     No current facility-administered medications for this visit.        ALLERGIES: Morphine, Erythromycin, Hydrocodone-acetaminophen, Mastisol adhesive [mastisol], Meperidine hcl, and Other drug    ROS  Constitutional: Denies fevers, Denies weight changes  Ears/Nose/Throat/Mouth: Denies nasal congestion or sore throat   Cardiovascular: Denies chest pain  Respiratory: Denies shortness of breath, Denies cough  Gastrointestinal/Hepatic: Denies nausea, vomiting  Sleep: see HPI      PHYSICAL EXAM  /60 (BP Location: Left arm, Patient Position: Sitting, BP Cuff Size: Adult)   Pulse (!) 57   Resp 16   Ht 1.626 m (5' 4\")   Wt 83.9 kg (185 lb)   SpO2 95%   BMI 31.76 kg/m²   Appearance: Well-nourished, well-developed, no acute distress  Eyes:  No scleral icterus , EOMI  Musculoskeletal:  Grossly normal; gait and station normal; digits and nails normal  Skin:  No rashes, petechiae, cyanosis  Neurologic: without focal signs; oriented to person, time, place, and purpose; judgement intact      Medical Decision Making   Assessment and Plan  LIZETH with nocturnal hypoxemia on auto BiPAP with supplemental oxygen dated    The medical record was reviewed.  Diagnostic and titration nocturnal polysomnogram's, home sleep apnea tests, continuous nocturnal oximetry results, multiple sleep latency tests, and compliance reports reviewed.  Compliance data reviewed showing 93% usage > 4hours in last 30 days. Average AHI 2.4 events/hour. Pt continues to use and benefit from machine.      Current Settings 16/10, PS: 4    PLAN:   -Continue on current settings.  Once patient settled o mask and happy with interface, we will pursue OPO to ensure no residual hypoxemia on current settings  -Encouraged patient to trial other nasal mask " interfaces including F&P solo nasal mask, N30, N30i, etc. With chinstrap  -Order placed for mask and supplies   -Advised to reach out via MyChart with questions     Has been advised to continue the current settings, clean equipment frequently, and get new mask and supplies as allowed by insurance and DME. Recommend an earlier appointment, if significant treatment barriers develop.    Patients with LIZETH are at increased risk of cardiovascular disease including coronary artery disease, systemic arterial hypertension, pulmonary arterial hypertension, cardiac arrythmias, and stroke. The patient was advised to avoid driving a motor vehicle when drowsy.    Positive airway pressure will favorably impact many of the adverse conditions and effects provoked by LIZETH.    Have advised the patient to follow up with the appropriate healthcare practitioners for all other medical problems and issues.    Return in about 6 months (around 9/5/2025) for CPAP compliance.      Please note portions of this record was created using voice recognition software. I have made every reasonable attempt to correct obvious errors, but I expect that there are errors of grammar and possibly content I did not discover before finalizing the note.

## 2025-03-06 ENCOUNTER — APPOINTMENT (OUTPATIENT)
Dept: PHYSICAL THERAPY | Facility: MEDICAL CENTER | Age: 80
End: 2025-03-06
Attending: FAMILY MEDICINE
Payer: MEDICARE

## 2025-03-06 ENCOUNTER — APPOINTMENT (OUTPATIENT)
Dept: PHYSICAL THERAPY | Facility: MEDICAL CENTER | Age: 80
End: 2025-03-06
Payer: MEDICARE

## 2025-03-13 ENCOUNTER — OFFICE VISIT (OUTPATIENT)
Dept: URGENT CARE | Facility: CLINIC | Age: 80
End: 2025-03-13
Payer: MEDICARE

## 2025-03-13 VITALS
TEMPERATURE: 96.7 F | WEIGHT: 185 LBS | OXYGEN SATURATION: 91 % | HEIGHT: 64 IN | DIASTOLIC BLOOD PRESSURE: 88 MMHG | SYSTOLIC BLOOD PRESSURE: 138 MMHG | BODY MASS INDEX: 31.58 KG/M2 | RESPIRATION RATE: 18 BRPM | HEART RATE: 69 BPM

## 2025-03-13 DIAGNOSIS — S61.211A LACERATION OF LEFT INDEX FINGER WITHOUT FOREIGN BODY, NAIL DAMAGE STATUS UNSPECIFIED, INITIAL ENCOUNTER: ICD-10-CM

## 2025-03-13 DIAGNOSIS — Z23 NEED FOR VACCINE FOR DT (DIPHTHERIA-TETANUS): ICD-10-CM

## 2025-03-13 PROCEDURE — 12041 INTMD RPR N-HF/GENIT 2.5CM/<: CPT | Performed by: NURSE PRACTITIONER

## 2025-03-13 PROCEDURE — 3075F SYST BP GE 130 - 139MM HG: CPT | Performed by: NURSE PRACTITIONER

## 2025-03-13 PROCEDURE — 90471 IMMUNIZATION ADMIN: CPT | Performed by: NURSE PRACTITIONER

## 2025-03-13 PROCEDURE — 90715 TDAP VACCINE 7 YRS/> IM: CPT | Performed by: NURSE PRACTITIONER

## 2025-03-13 PROCEDURE — 3079F DIAST BP 80-89 MM HG: CPT | Performed by: NURSE PRACTITIONER

## 2025-03-13 ASSESSMENT — FIBROSIS 4 INDEX: FIB4 SCORE: 2.1

## 2025-03-14 NOTE — PROGRESS NOTES
"Jackie Johnson is a 79 y.o. female who presents for Laceration (X today cutting a bread and sliced left index finger)      HPI  This is a new problem. Jackie Johnson is a 79 y.o. patient who presents to urgent care with c/o: She was cutting bread with a serrated knife.  The knife slipped and hit her left index finger causing laceration.  She had a hard time getting the bleeding to stop.  She did call her friend who is a former ER nurse who wrapped it with a Telfa and Coban and then brought her to the urgent care.  She is not on anticoagulant therapy.  She denies numbness or ting of her finger.  She reports that her oxygen is always just above 90% which is good for her.  Denies shortness of breath.    ROS See HPI    Allergies:       Allergies   Allergen Reactions    Morphine      Lowers heart rate    Erythromycin Nausea    Hydrocodone-Acetaminophen     Mastisol Adhesive [Mastisol] Rash    Meperidine Hcl Unspecified     [08/08/09 UNKNOWN, PLEASE VERIFY]    Other Drug      \"All narcotics make me nauseated\"       PMSFS Hx:  Past Medical History:   Diagnosis Date    Allergy     Anxiety     Arthritis     Asthma     Back pain     Back pain     Bronchitis     Cardiac arrhythmia     Chest pressure 07/11/2022    Chickenpox     Constipation     Cough     Depression     Dizziness     Dysrhythmia     Fatigue     GERD (gastroesophageal reflux disease)     Hair loss 10/27/2021    History of nonmelanoma skin cancer 03/21/2018    Formatting of this note might be different from the original. 2016 right upper arm squamous cell carcinoma status post edc.  Plan: yearly dermatology exam    Hypertension     Mumps     Nasal drainage     Obesity     Painful joint     Ringing in ears     Tonsillitis     Vaginal Papanicolaou smear not required due to history of hysterectomy 02/28/2013    Formatting of this note might be different from the original. Cervix is surgically absent     Past Surgical History:   Procedure Laterality Date    " ABDOMINAL HYSTERECTOMY TOTAL      ARTHROPLASTY      ARTHROSCOPY, KNEE      CARPAL TUNNEL RELEASE      EYE SURGERY      OTHER ORTHOPEDIC SURGERY Bilateral     knee replacements    ID REMV 2ND CATARACT,CORN-SCLER SECTN      TONSILLECTOMY      TUBAL COAGULATION LAPAROSCOPIC BILATERAL       Family History   Problem Relation Age of Onset    Cancer Father         Prostate    Diabetes Father     Heart Disease Father     Prostate cancer Father     Colorectal Cancer Maternal Grandfather     Glaucoma Maternal Grandfather     Colon Cancer Maternal Grandfather     Heart Disease Maternal Grandmother      Social History     Tobacco Use    Smoking status: Former     Current packs/day: 0.00     Average packs/day: 1 pack/day for 56.0 years (56.0 ttl pk-yrs)     Types: Cigarettes     Start date: 10/5/1964     Quit date: 10/10/1985     Years since quittin.4    Smokeless tobacco: Never   Substance Use Topics    Alcohol use: Yes     Alcohol/week: 4.2 oz     Types: 7 Glasses of wine per week     Comment: occ         Problems:   Patient Active Problem List   Diagnosis    Supraventricular tachycardia, paroxysmal (HCC)    Major depressive disorder, recurrent episode (HCC)    GERD (gastroesophageal reflux disease)    Essential hypertension    Bilateral primary osteoarthritis of knee    Hyperlipidemia    Asthma    Irritable bowel syndrome    Diarrhea    BMI 32.0-32.9,adult    Obesity (BMI 30-39.9)    Atherosclerosis    LIZETH (obstructive sleep apnea)    Primary osteoarthritis of both hands    COVID-19       Medications:   Current Outpatient Medications on File Prior to Visit   Medication Sig Dispense Refill    sertraline (ZOLOFT) 100 MG Tab Take 1 tablet by mouth every day 100 Tablet 3    beclomethasone HFA (QVAR REDIHALER) 40 MCG/ACT inhaler Inhale 1 Puff 2 times a day. 10.6 g 3    benzonatate (TESSALON) 100 MG Cap Take 1 Capsule by mouth 3 times a day as needed for Cough. 30 Capsule 3    albuterol 108 (90 Base) MCG/ACT Aero Soln  "inhalation aerosol Inhale 1 to 2 puffs by mouth every 4 to 6 hours as needed for wheezing and cough 18 g 2    traZODone (DESYREL) 50 MG Tab Take 1/2 tab (cut the tablet in half) by mouth before bed take the 2nd half if not able to sleep 1 hour after first dose. 90 Tablet 3    simvastatin (ZOCOR) 40 MG Tab Take 1 Tablet by mouth every evening. 100 Tablet 3    atenolol (TENORMIN) 50 MG Tab Take 1 Tablet by mouth every day. 100 Tablet 3    omeprazole (PRILOSEC) 20 MG delayed-release capsule Take 2 Capsules by mouth every day. 200 Capsule 3    ezetimibe (ZETIA) 10 MG Tab Take 1 Tablet by mouth every day. 100 Tablet 2    methylPREDNISolone (MEDROL DOSEPAK) 4 MG Tablet Therapy Pack As directed on the packaging label. (Patient not taking: Reported on 1/7/2025) 21 Tablet 0     No current facility-administered medications on file prior to visit.        Objective:     /88   Pulse 69   Temp 35.9 °C (96.7 °F) (Temporal)   Resp 18   Ht 1.626 m (5' 4\")   Wt 83.9 kg (185 lb)   SpO2 91%   BMI 31.76 kg/m²     Physical Exam  Vitals and nursing note reviewed.   Constitutional:       Appearance: Normal appearance.   Cardiovascular:      Rate and Rhythm: Normal rate and regular rhythm.      Pulses: Normal pulses.      Heart sounds: Normal heart sounds.   Pulmonary:      Effort: Pulmonary effort is normal.      Breath sounds: Normal breath sounds.   Musculoskeletal:      Left hand: Laceration present. There is no disruption of two-point discrimination.   Skin:     General: Skin is warm.      Capillary Refill: Capillary refill takes less than 2 seconds.   Neurological:      Mental Status: She is alert and oriented to person, place, and time.   Psychiatric:         Mood and Affect: Mood normal.         Behavior: Behavior normal.     Procedure: Laceration Repair   -Risks benefits and alternatives discussed including bleeding, nerve damage, infection, and poor cosmetic outcome discussed at length. Benefits and alternatives " discussed. Consent was obtained for repair of laceration    Wound length 2 cm, location index finger left hand  straight laceration, subcut tissue visible   Wound NVI, No signs of tendon injury   Area extensively irrigated with NS, wound explored, No fb identified.   Under clean conditions, I injected  2  cc of   1 % lidocaine. Good anesthesia was obtained. Under sterile conditions, I applied 3 sutures with 5.0 ethilon interrupted sutures with good wound edge approximation.  Td vaccination indicated and given today   Bleeding was controlled. There were no procedural complications. Patient tolerated procedure well. Dressing was applied and wound care/follow up instructions were given.  Keep clean and dry for 24 hours then clean daily with mild soap and water. Return for s/s of infections ( swelling, pain, redness, pus, fever)   Suture removal 7  days.   Patients questions were answered.      Assessment /Associated Orders:      1. Laceration of left index finger without foreign body, nail damage status unspecified, initial encounter  Tdap =>6yo IM      2. Need for vaccine for DT (diphtheria-tetanus)            Medical Decision Making:    Jackie Johnson is a very pleasant 79 y.o. female who is clinically stable at today's acute urgent care visit. Presents with acute problem/ concern today.    No acute distress is noted at the time of the visit.  VSS. Appropriate for outpatient care at this time.      Educated in on going wound care at home. The patient is alerted to watch for any signs of infection (redness, pus, pain, increased swelling or fever) and call if such occurs.   SR 7 days   OTC  analgesic of choice (acetaminophen or NSAID) prn pain. Follow manufactures dosing and safety precautions.   Ice pack prn pain   Elevation prn     Through shared decision making a discussion of the Dx and DDx, management options (risks,benefits, and alternatives to planned treatment), natural progression, supportive care and  indications for immediate follow-up discussed. Expressed understanding and the treatment plan was agreed upon.    Questions were encouraged and answered     Follow Up:   Return to urgent care prn if new or worsening sx or if there is no improvement in condition prn.                Please note that this dictation was created using voice recognition software. I have worked with consultants from the vendor as well as technical experts from [facility] to optimize the interface. I have made every reasonable attempt to correct obvious errors, but I expect that there are errors of grammar and possibly content that I did not discover before finalizing the note.  This note was electronically signed by provider

## 2025-03-19 ASSESSMENT — ACTIVITIES OF DAILY LIVING (ADL): BATHING_REQUIRES_ASSISTANCE: 0

## 2025-03-19 ASSESSMENT — PATIENT HEALTH QUESTIONNAIRE - PHQ9
2. FEELING DOWN, DEPRESSED, IRRITABLE, OR HOPELESS: NOT AT ALL
1. LITTLE INTEREST OR PLEASURE IN DOING THINGS: SEVERAL DAYS

## 2025-03-19 ASSESSMENT — ENCOUNTER SYMPTOMS: GENERAL WELL-BEING: EXCELLENT

## 2025-03-20 PROBLEM — U07.1 COVID-19: Status: RESOLVED | Noted: 2024-09-20 | Resolved: 2025-03-20

## 2025-03-20 NOTE — ASSESSMENT & PLAN NOTE
Chronic, stable. Maintains on statin therapy without issue. Most recent lipid panel from 10/2024 WNL. She questions if she should continue as she prefers to be off these medications. Continue simvastatin 40mg daily and Zetia 10mg daily. Recommend Mediterranean diet and regular physical activity. Follow up with PCP at least annually for continued monitoring and management.   Lab Results   Component Value Date/Time    CHOLSTRLTOT 110 10/24/2024 10:10 AM    LDL 42 10/24/2024 10:10 AM    HDL 55 10/24/2024 10:10 AM    TRIGLYCERIDE 65 10/24/2024 10:10 AM

## 2025-03-20 NOTE — ASSESSMENT & PLAN NOTE
Chronic, stable. BP today 120/78. Pt does not monitor BP at home. Denies dizziness/lightheadedness, chest pain, dyspnea. Continue current treatment regime: atenolol 50mg daily. Follow up with PCP annually for continued monitoring and management.

## 2025-03-21 ENCOUNTER — OFFICE VISIT (OUTPATIENT)
Dept: FAMILY PLANNING/WOMEN'S HEALTH CLINIC | Facility: PHYSICIAN GROUP | Age: 80
End: 2025-03-21
Payer: MEDICARE

## 2025-03-21 VITALS
SYSTOLIC BLOOD PRESSURE: 120 MMHG | TEMPERATURE: 97.1 F | OXYGEN SATURATION: 96 % | BODY MASS INDEX: 32.06 KG/M2 | WEIGHT: 187.8 LBS | RESPIRATION RATE: 15 BRPM | DIASTOLIC BLOOD PRESSURE: 78 MMHG | HEIGHT: 64 IN | HEART RATE: 61 BPM

## 2025-03-21 DIAGNOSIS — G47.33 OSA (OBSTRUCTIVE SLEEP APNEA): ICD-10-CM

## 2025-03-21 DIAGNOSIS — I10 ESSENTIAL HYPERTENSION: ICD-10-CM

## 2025-03-21 DIAGNOSIS — I47.10 SUPRAVENTRICULAR TACHYCARDIA, PAROXYSMAL (HCC): ICD-10-CM

## 2025-03-21 DIAGNOSIS — M85.89 OSTEOPENIA OF MULTIPLE SITES: ICD-10-CM

## 2025-03-21 DIAGNOSIS — E78.5 HYPERLIPIDEMIA, UNSPECIFIED HYPERLIPIDEMIA TYPE: ICD-10-CM

## 2025-03-21 DIAGNOSIS — F33.0 MILD EPISODE OF RECURRENT MAJOR DEPRESSIVE DISORDER (HCC): ICD-10-CM

## 2025-03-21 DIAGNOSIS — J45.909 UNCOMPLICATED ASTHMA, UNSPECIFIED ASTHMA SEVERITY, UNSPECIFIED WHETHER PERSISTENT: ICD-10-CM

## 2025-03-21 PROCEDURE — G0439 PPPS, SUBSEQ VISIT: HCPCS | Performed by: PHYSICIAN ASSISTANT

## 2025-03-21 PROCEDURE — 1126F AMNT PAIN NOTED NONE PRSNT: CPT | Performed by: PHYSICIAN ASSISTANT

## 2025-03-21 PROCEDURE — 3074F SYST BP LT 130 MM HG: CPT | Performed by: PHYSICIAN ASSISTANT

## 2025-03-21 PROCEDURE — 3078F DIAST BP <80 MM HG: CPT | Performed by: PHYSICIAN ASSISTANT

## 2025-03-21 SDOH — ECONOMIC STABILITY: FOOD INSECURITY: WITHIN THE PAST 12 MONTHS, THE FOOD YOU BOUGHT JUST DIDN'T LAST AND YOU DIDN'T HAVE MONEY TO GET MORE.: NEVER TRUE

## 2025-03-21 SDOH — ECONOMIC STABILITY: HOUSING INSECURITY: IN THE LAST 12 MONTHS, WAS THERE A TIME WHEN YOU WERE NOT ABLE TO PAY THE MORTGAGE OR RENT ON TIME?: NO

## 2025-03-21 SDOH — ECONOMIC STABILITY: FOOD INSECURITY: WITHIN THE PAST 12 MONTHS, YOU WORRIED THAT YOUR FOOD WOULD RUN OUT BEFORE YOU GOT THE MONEY TO BUY MORE.: NEVER TRUE

## 2025-03-21 SDOH — ECONOMIC STABILITY: TRANSPORTATION INSECURITY: IN THE PAST 12 MONTHS, HAS LACK OF TRANSPORTATION KEPT YOU FROM MEDICAL APPOINTMENTS OR FROM GETTING MEDICATIONS?: NO

## 2025-03-21 SDOH — ECONOMIC STABILITY: FOOD INSECURITY: HOW HARD IS IT FOR YOU TO PAY FOR THE VERY BASICS LIKE FOOD, HOUSING, MEDICAL CARE, AND HEATING?: SOMEWHAT HARD

## 2025-03-21 ASSESSMENT — PAIN SCALES - GENERAL: PAINLEVEL_OUTOF10: NO PAIN

## 2025-03-21 ASSESSMENT — ACTIVITIES OF DAILY LIVING (ADL): LACK_OF_TRANSPORTATION: NO

## 2025-03-21 ASSESSMENT — PATIENT HEALTH QUESTIONNAIRE - PHQ9
5. POOR APPETITE OR OVEREATING: 1 - SEVERAL DAYS
SUM OF ALL RESPONSES TO PHQ QUESTIONS 1-9: 4
CLINICAL INTERPRETATION OF PHQ2 SCORE: 1

## 2025-03-21 ASSESSMENT — FIBROSIS 4 INDEX: FIB4 SCORE: 2.1

## 2025-03-21 NOTE — ASSESSMENT & PLAN NOTE
Chronic, stable. Denies dyspnea, wheezing, coughing. Follow up with PCP at least annually for continued monitoring and management.

## 2025-03-21 NOTE — ASSESSMENT & PLAN NOTE
Chronic, stable. Pt maintains on BiPAP nightly with supplemental O2. Follow up with sleep medicine per routine.

## 2025-03-21 NOTE — ASSESSMENT & PLAN NOTE
Chronic, stable. Last DEXA 2023 with osteopenia of the lumbar spine and proximal left femur with T scores of -1.0 and -1.9 respectively. Denies hx of fragility fracture. Advise calcium and vitamin D supplementation with weightbearing exercises as tolerated. Discussed fall risk reduction methods. Follow up with PCP at least annually for continued monitoring and management.

## 2025-03-21 NOTE — ASSESSMENT & PLAN NOTE
Chronic, stable, improving per pt report. Pt maintains on sertraline 100mg daily with improvement in mood. She is finding better balance with caring for her  who is now in memory care and maintaining her social and physical health. She practices meditation and sees a therapist.  Follow up with PCP at least annually for continued monitoring and management.

## 2025-03-21 NOTE — PROGRESS NOTES
Comprehensive Health Assessment Program     Jackie Johnson is a 79 y.o. here for her comprehensive health assessment.    Patient Active Problem List    Diagnosis Date Noted    Osteopenia of multiple sites 03/21/2025    LIZETH (obstructive sleep apnea) 02/21/2024    Primary osteoarthritis of both hands 02/21/2024    Obesity (BMI 30-39.9) 10/06/2023    Atherosclerosis 10/06/2023    Diarrhea 09/08/2023    BMI 32.0-32.9,adult 09/08/2023    Irritable bowel syndrome 07/14/2023    Asthma 07/11/2022    Essential hypertension 01/31/2020    Hyperlipidemia 05/15/2018    Major depressive disorder, recurrent episode (HCC) 09/30/2014    Bilateral primary osteoarthritis of knee 03/13/2008    Supraventricular tachycardia, paroxysmal (HCC) 10/31/2007    GERD (gastroesophageal reflux disease) 10/31/2007       Current Outpatient Medications   Medication Sig Dispense Refill    beclomethasone HFA (QVAR REDIHALER) 40 MCG/ACT inhaler Inhale 1 Puff 2 times a day. 10.6 g 3    albuterol 108 (90 Base) MCG/ACT Aero Soln inhalation aerosol Inhale 1 to 2 puffs by mouth every 4 to 6 hours as needed for wheezing and cough 18 g 2    sertraline (ZOLOFT) 100 MG Tab Take 1 tablet by mouth every day 100 Tablet 3    benzonatate (TESSALON) 100 MG Cap Take 1 Capsule by mouth 3 times a day as needed for Cough. (Patient not taking: Reported on 3/21/2025) 30 Capsule 3    traZODone (DESYREL) 50 MG Tab Take 1/2 tab (cut the tablet in half) by mouth before bed take the 2nd half if not able to sleep 1 hour after first dose. 90 Tablet 3    simvastatin (ZOCOR) 40 MG Tab Take 1 Tablet by mouth every evening. 100 Tablet 3    atenolol (TENORMIN) 50 MG Tab Take 1 Tablet by mouth every day. 100 Tablet 3    omeprazole (PRILOSEC) 20 MG delayed-release capsule Take 2 Capsules by mouth every day. 200 Capsule 3    ezetimibe (ZETIA) 10 MG Tab Take 1 Tablet by mouth every day. (Patient not taking: Reported on 3/21/2025) 100 Tablet 2     No current facility-administered  medications for this visit.          Current supplements as per medication list.     Allergies:   Morphine, Erythromycin, Hydrocodone-acetaminophen, Mastisol adhesive [mastisol], Meperidine hcl, and Other drug  Social History     Tobacco Use    Smoking status: Former     Current packs/day: 0.00     Average packs/day: 1 pack/day for 56.0 years (56.0 ttl pk-yrs)     Types: Cigarettes     Start date: 10/5/1964     Quit date: 10/10/1985     Years since quittin.4    Smokeless tobacco: Never   Vaping Use    Vaping status: Never Used   Substance Use Topics    Alcohol use: Yes     Alcohol/week: 1.8 oz     Types: 3 Glasses of wine per week    Drug use: No     Family History   Problem Relation Age of Onset    Cancer Father         Prostate    Diabetes Father     Heart Disease Father     Prostate cancer Father     Colorectal Cancer Maternal Grandfather     Glaucoma Maternal Grandfather     Colon Cancer Maternal Grandfather     Heart Disease Maternal Grandmother      Jackie  has a past medical history of Allergy, Anxiety, Arthritis, Asthma, Back pain, Back pain, Bronchitis, Cardiac arrhythmia, Chest pressure (2022), Chickenpox, Constipation, Cough, COVID-19 (2024), Depression, Dizziness, Dysrhythmia, Fatigue, GERD (gastroesophageal reflux disease), Hair loss (10/27/2021), History of nonmelanoma skin cancer (2018), Hypertension, Mumps, Nasal drainage, Obesity, Painful joint, Ringing in ears, Tonsillitis, and Vaginal Papanicolaou smear not required due to history of hysterectomy (2013).   Past Surgical History:   Procedure Laterality Date    ABDOMINAL HYSTERECTOMY TOTAL      ARTHROPLASTY      ARTHROSCOPY, KNEE      CARPAL TUNNEL RELEASE      EYE SURGERY      OTHER ORTHOPEDIC SURGERY Bilateral     knee replacements    GA REMV 2ND CATARACT,CORN-SCLER SECTN      TONSILLECTOMY      TUBAL COAGULATION LAPAROSCOPIC BILATERAL         Screening:  In the last six months have you experienced any leakage of  urine? Yes, chronic, with coughing, performs Kegels with improvement    Depression Screening  Little interest or pleasure in doing things?  1 - several days  Feeling down, depressed , or hopeless? 0 - not at all  Trouble falling or staying asleep, or sleeping too much?  1 - several days  Feeling tired or having little energy?  1 - several days  Poor appetite or overeating?  1 - several days  Feeling bad about yourself - or that you are a failure or have let yourself or your family down? 0 - not at all  Trouble concentrating on things, such as reading the newspaper or watching television? 0 - not at all  Moving or speaking so slowly that other people could have noticed.  Or the opposite - being so fidgety or restless that you have been moving around a lot more than usual?  0 - not at all  Thoughts that you would be better off dead, or of hurting yourself?  0 - not at all  Patient Health Questionnaire Score: 4    If depressive symptoms identified deferred to follow up visit unless specifically addressed in assessment and plan.    Interpretation of PHQ-9 Total Score   Score Severity   1-4 No Depression   5-9 Mild Depression   10-14 Moderate Depression   15-19 Moderately Severe Depression   20-27 Severe Depression    Screening for Cognitive Impairment  Do you or any of your friends or family members have any concern about your memory? No  Three Minute Recall (Village, Kitchen, Baby) 3/3    Tito clock face with all 12 numbers and set the hands to show 10 minutes past 11.  Yes    Cognitive concerns identified deferred for follow up unless specifically addressed in assessment and plan.    Fall Risk Assessment  Has the patient had two or more falls in the last year or any fall with injury in the last year?  No    Safety Assessment  Do you always wear your seatbelt?  Yes  Any changes to home needed to function safely? No  Difficulty hearing.  No  Patient counseled about all safety risks that were identified.    Functional  Assessment ADLs  Are there any barriers preventing you from cooking for yourself or meeting nutritional needs?  No.    Are there any barriers preventing you from driving safely or obtaining transportation?  No.    Are there any barriers preventing you from using a telephone or calling for help?  No    Are there any barriers preventing you from shopping?  No.    Are there any barriers preventing you from taking care of your own finances?  No    Are there any barriers preventing you from managing your medications?  No    Are there any barriers preventing you from showering, bathing or dressing yourself? No    Are there any barriers preventing you from doing housework or laundry? No    Are there any barriers preventing you from using the toilet?No    Are you currently engaging in any exercise or physical activity?  Yes. Home PT exercises for right arm strengthening, walking, yoga.     Self-Assessment of Health  What is your perception of your health? Excellent    Do you sleep more than six hours a night? Yes    In the past 7 days, how much did pain keep you from doing your normal work? None    Do you spend quality time with family or friends (virtually or in person)? Yes    Do you usually eat a heart healthy diet that constists of a variety of fruits, vegetables, whole grains and fiber? Yes    Do you eat foods high in fat and/or Fast Food more than three times per week? No    How concerned are you that your medical conditions are not being well managed? Not at all    Are you worried that in the next 2 months, you may not have stable housing that you own, rent, or stay in as part of a household? No        Advance Care Planning  Do you have an Advance Directive, Living Will, Durable Power of , or POLST? Yes  Advance Directive Living Will Durable Power of    is not on file - instructed patient to bring in a copy to scan into their chart      Health Maintenance Summary            Current Care Gaps        COVID-19 Vaccine (5 - 2024-25 season) Overdue since 9/1/2024      10/16/2023  Imm Admin: COVID-19, mRNA, LNP-S, PF, ebony-sucrose, 30 mcg/0.3 mL    08/25/2021  Imm Admin: PFIZER PURPLE CAP SARS-COV-2 VACCINATION (12+)    02/25/2021  Imm Admin: PFIZER PURPLE CAP SARS-COV-2 VACCINATION (12+)    02/04/2021  Imm Admin: PFIZER PURPLE CAP SARS-COV-2 VACCINATION (12+)                      Upcoming       Annual Wellness Visit (Yearly) Next due on 3/21/2026      03/21/2025  Level of Service: DC ANNUAL WELLNESS VISIT-INCLUDES PPPS SUBSEQUE*    10/24/2024  Subsequent Annual Wellness Visit - Includes PPPS ()    10/06/2023  Level of Service: ANNUAL WELLNESS VISIT-INCLUDES PPPS SUBSEQUE*    07/14/2023  Subsequent Annual Wellness Visit - Includes PPPS ()    05/17/2021  Done - Darwin petersen M.D. from [facility] exam     Only the first 5 history entries have been loaded, but more history exists.            Bone Density Scan (Every 5 Years) Next due on 9/8/2028 09/08/2023  DS-BONE DENSITY STUDY (DEXA)    12/22/2017  DS-BONE DENSITY STUDY (DEXA)    12/22/2017  Done    08/21/2012  DS-BONE DENSITY STUDY (DEXA)              IMM DTaP/Tdap/Td Vaccine (5 - Td or Tdap) Next due on 3/13/2035      03/13/2025  Imm Admin: Tdap Vaccine    08/27/2018  Imm Admin: Tdap Vaccine    09/18/2008  Imm Admin: Tdap Vaccine    02/01/2006  Imm Admin: Tdap Vaccine    02/01/2006  Imm Admin: TD Vaccine      Only the first 5 history entries have been loaded, but more history exists.                      Completed or No Longer Recommended       Influenza Vaccine (Series Information) Completed      10/07/2024  Imm Admin: Influenza high-dose trivalent (PF)    10/16/2023  Imm Admin: Influenza Vaccine Adult HD    08/23/2022  Imm Admin: Influenza Vaccine Adult HD    10/04/2021  Imm Admin: Influenza Vaccine Adult HD    09/08/2020  Imm Admin: Influenza Vaccine Adult HD      Only the first 5 history entries have been loaded, but more history exists.               Zoster (Shingles) Vaccines (Series Information) Completed      10/24/2022  Imm Admin: Zoster Vaccine Recombinant (RZV) (SHINGRIX)    08/15/2022  Imm Admin: Zoster Vaccine Recombinant (RZV) (SHINGRIX)    02/06/2009  Imm Admin: Zoster Vaccine Live (ZVL) (Zostavax) - HISTORICAL DATA              Pneumococcal Vaccine: 50+ Years (Series Information) Completed      08/02/2017  Imm Admin: Pneumococcal polysaccharide vaccine (PPSV-23)    06/03/2015  Imm Admin: Pneumococcal Conjugate Vaccine (Prevnar/PCV-13)    06/12/2013  Imm Admin: Pneumococcal polysaccharide vaccine (PPSV-23)              Hepatitis C Screening  Addressed      03/11/2008  Reason not specified              Hepatitis A Vaccine (Hep A) (Series Information) Aged Out      No completion history exists for this topic.              Hepatitis B Vaccine (Hep B) (Series Information) Aged Out     No completion history exists for this topic.              HPV Vaccines (Series Information) Aged Out     No completion history exists for this topic.              Polio Vaccine (Inactivated Polio) (Series Information) Aged Out     No completion history exists for this topic.              Meningococcal Immunization (Series Information) Aged Out     No completion history exists for this topic.              A1c Screening  Discontinued        Frequency changed to Never automatically (Topic No Longer Applies)    09/08/2023  HEMOGLOBIN A1C    10/27/2021  HEMOGLOBIN A1C    05/24/2021  HEMOGLOBIN A1C    08/10/2020  HEMOGLOBIN A1C     Only the first 5 history entries have been loaded, but more history exists.            Fasting Lipid Profile  Discontinued        Frequency changed to Never automatically (Topic No Longer Applies)    10/24/2024  Lipid Profile    09/09/2023  Lipid Profile    07/20/2023  Lipid Profile    07/12/2022  Lipid Profile (Lipid Panel) Fasting      Only the first 5 history entries have been loaded, but more history exists.              SERUM CREATININE   Discontinued        Frequency changed to Never automatically (Topic No Longer Applies)    10/24/2024  Comp Metabolic Panel    09/09/2023  Basic Metabolic Panel (BMP)    09/08/2023  COMP METABOLIC PANEL    07/20/2023  Comp Metabolic Panel      Only the first 5 history entries have been loaded, but more history exists.              Mammogram  Discontinued        Frequency changed to Never automatically (Topic No Longer Applies)    11/15/2024  MA-SCREENING MAMMO BILAT W/TOMOSYNTHESIS W/CAD    11/06/2023  MA-SCREENING MAMMO BILAT W/TOMOSYNTHESIS W/CAD    11/03/2022  MA-SCREENING MAMMO BILAT W/TOMOSYNTHESIS W/CAD    11/01/2021  [id number]      Only the first 5 history entries have been loaded, but more history exists.              Colorectal Cancer Screening  Discontinued        Frequency changed to Never automatically (Topic No Longer Applies)    09/08/2023  OCCULT BLOOD X3 (STOOL)    04/06/2022  Referral to GI for Colonoscopy    06/03/2015  REFERRAL TO GI FOR COLONOSCOPY                            Patient Care Team:  Saeid Alvarado M.D. as PCP - General (Family Medicine)  Sundar Flores M.D. (Dermatology)  John Webster D.P.M. as Surgeon (Podiatry)  OKSANA De Leon (Gastroenterology)  [employer] (DME Supplier)  Cyndi Mcneill PT as Physical Therapist (Physical Therapy)    Financial Resource Strain: Medium Risk (3/21/2025)    Overall Financial Resource Strain (CARDIA)     Difficulty of Paying Living Expenses: Somewhat hard      Transportation Needs: No Transportation Needs (3/21/2025)    PRAPARE - Transportation     Lack of Transportation (Medical): No     Lack of Transportation (Non-Medical): No      Food Insecurity: No Food Insecurity (3/21/2025)    Hunger Vital Sign     Worried About Running Out of Food in the Last Year: Never true     Ran Out of Food in the Last Year: Never true        Encounter Vitals  Temperature: 36.2 °C (97.1 °F)  Temp src: Temporal  Blood Pressure :  "120/78  Pulse: 61  Respiration: 15  Pulse Oximetry: 96 %  O2 Delivery Device: BIPAP, None - Room Air  Weight: 85.2 kg (187 lb 12.8 oz)  Height: 162.6 cm (5' 4\")  BMI (Calculated): 32.24  Pain Score: No pain  DME  O2 Delivery Device: BIPAP, None - Room Air     Physical Exam:  Constitutional: NAD  HENMT: NC/AT, EOMI  Cardiovascular: RRR, No m/r/g  Lungs: CTAB, no w/r/r  Extremities: No c/c/e  Skin: No lesions notes  Neurologic: Alert & oriented x3, CN II-XII grossly intact    Assessment and Plan. The following treatment and monitoring plan is recommended:  Hyperlipidemia  Chronic, stable. Maintains on statin therapy without issue. Most recent lipid panel from 10/2024 WNL. She questions if she should continue as she prefers to be off these medications. Continue simvastatin 40mg daily and Zetia 10mg daily. Recommend Mediterranean diet and regular physical activity. Follow up with PCP at least annually for continued monitoring and management.   Lab Results   Component Value Date/Time    CHOLSTRLTOT 110 10/24/2024 10:10 AM    LDL 42 10/24/2024 10:10 AM    HDL 55 10/24/2024 10:10 AM    TRIGLYCERIDE 65 10/24/2024 10:10 AM       Essential hypertension  Chronic, stable. BP today 120/78. Pt does not monitor BP at home. Denies dizziness/lightheadedness, chest pain, dyspnea. Continue current treatment regime: atenolol 50mg daily. Follow up with PCP annually for continued monitoring and management.     LIZETH (obstructive sleep apnea)  Chronic, stable. Pt maintains on BiPAP nightly with supplemental O2. Follow up with sleep medicine per routine.    Major depressive disorder, recurrent episode (HCC)  Chronic, stable, improving per pt report. Pt maintains on sertraline 100mg daily with improvement in mood. She is finding better balance with caring for her  who is now in memory care and maintaining her social and physical health. She practices meditation and sees a therapist.  Follow up with PCP at least annually for continued " monitoring and management.    Asthma  Chronic, stable. Denies dyspnea, wheezing, coughing. Follow up with PCP at least annually for continued monitoring and management.    Supraventricular tachycardia, paroxysmal (HCC)  Chronic, stable. Pt maintains on atenolol 50mg daily. Denies palpitations. Follow up with PCP per routine.    Osteopenia of multiple sites  Chronic, stable. Last DEXA 2023 with osteopenia of the lumbar spine and proximal left femur with T scores of -1.0 and -1.9 respectively. Denies hx of fragility fracture. Advise calcium and vitamin D supplementation with weightbearing exercises as tolerated. Discussed fall risk reduction methods. Follow up with PCP at least annually for continued monitoring and management.    Services suggested: No services needed at this time  Health Care Screening: Age-appropriate preventive services recommended by USPTF and ACIP covered by Medicare were discussed today. Services ordered if indicated and agreed upon by the patient.  Referrals offered: Community-based lifestyle interventions to reduce health risks and promote self-management and wellness, fall prevention, nutrition, physical activity, tobacco-use cessation, weight loss, and mental health services as per orders if indicated.    Discussion today about general wellness and lifestyle habits:    Prevent falls and reduce trip hazards; Cautioned about securing or removing rugs.  Have a working fire alarm and carbon monoxide detector.  Engage in regular physical activity and social activities.    Follow-up: No follow-ups on file.

## 2025-03-21 NOTE — ASSESSMENT & PLAN NOTE
Chronic, stable. Pt maintains on atenolol 50mg daily. Denies palpitations. Follow up with PCP per routine.

## 2025-03-31 PROCEDURE — RXMED WILLOW AMBULATORY MEDICATION CHARGE: Performed by: FAMILY MEDICINE

## 2025-04-02 ENCOUNTER — PHARMACY VISIT (OUTPATIENT)
Dept: PHARMACY | Facility: MEDICAL CENTER | Age: 80
End: 2025-04-02
Payer: COMMERCIAL

## 2025-04-03 ENCOUNTER — PHYSICAL THERAPY (OUTPATIENT)
Dept: PHYSICAL THERAPY | Facility: MEDICAL CENTER | Age: 80
End: 2025-04-03
Attending: FAMILY MEDICINE
Payer: MEDICARE

## 2025-04-03 DIAGNOSIS — M25.511 CHRONIC RIGHT SHOULDER PAIN: ICD-10-CM

## 2025-04-03 DIAGNOSIS — G89.29 CHRONIC RIGHT SHOULDER PAIN: ICD-10-CM

## 2025-04-03 PROCEDURE — 97110 THERAPEUTIC EXERCISES: CPT

## 2025-04-03 NOTE — OP THERAPY PROGRESS SUMMARY
Outpatient Physical Therapy  PROGRESS SUMMARY NOTE      [facility]  [address]  [place] [address]  Phone:  [phone]  Fax:  [phone]    Date of Visit: 04/03/2025    Patient: Melania Johnson  YOB: 1945  MRN: [id number]     Referring Provider: Saeid Alvarado M.D.  [address] [facility]  [address]  [place] [address]   Referring Diagnosis Pain in right shoulder [M25.511];Other chronic pain [G89.29]     Visit Diagnoses     ICD-10-CM   1. Chronic right shoulder pain  M25.511    G89.29       Rehab Potential: fair    Progress Report Period: 1/21/2025-04/02/2025    Functional Assessment Used          Objective Findings and Assessment:   Patient progression towards goals: Pt has been seen in skilled physical therapy for 10 visits with this provider. Pt has made fair progress towards achieving goals thus far. Pt has shown progress with improved AROM in all directions with very little discomfort, increased painting tolerance, increased ability to perform ADLs with less pain. Pt continues to present with the following limitations: difficultly with reaching behind back and some continued soreness.     Short Term Goals:   1. Pt will report independence and compliance with written HEP (met)  2. Pt will demo R shoulder AROM to WFL in order to improve functional mobility (met)   3. Pt will demo improved RUE IR to WFL in order to don/doff coat with no increase in s/s (partially met; still reports that she has some difficulty with this)    Long Term Goals:    1. Pt will report independence and compliance with written HEP (met)  2. Pt will demo improved RUE strength to >/= to 4/5 in order to improve functional mobility and ease with ADLs (met)   3. Pt will report >/= to 70% improvement in sleep quality (in relation to R shoulder pain- met pt does not wake at all in the night with shoulder pain)   4. Pt will demo x10 repetitions of OH  lifting with >/= to 8lbs in order to improve functional mobility and ease with ADLs/chores within the home (in progress)  5. Pt will have significant improvement in QuickDash score with MCID of >/= to 10 points (eval: )         Objective findings and assessment details: Active Range of Motion      Right Shoulder   Flexion: 146 degrees with pain  Abduction: 160 degrees with pain  External rotation BTH: Active external rotation behind the head: R hand to T3   Internal rotation BTB: Active internal rotation behind the back: R hand to T12. with pain     Strength:       Left Shoulder   Normal muscle strength     Right Shoulder   Planes of Motion   External rotation at 0°:4 (immediate pain)   Internal rotation at 0°: 4+         Tests      Right Shoulder   Negative drop arm and Hawkin's.    Goals:   Short Term Goals:   1. Pt will demo improved RUE IR to WFL in order to don/doff coat with no increase in s/s  Short term goal time span:  1-2 weeks      Long Term Goals:    1. Pt will demo improved RUE strength to >/= to 4/5 in order to improve functional mobility and ease with ADLs  2..  Pt will demo x10 repetitions of OH lifting with >/= to 8lbs in order to improve functional mobility and ease with ADLs/chores within the home   Long term goal time span:  2-4 weeks    Plan:   Planned therapy interventions:  E Stim Unattended (CPT 73565), Neuromuscular Re-education (CPT 01660), Therapeutic Activities (CPT 22894), Therapeutic Exercise (CPT 49220) and Manual Therapy (CPT 85910)  Frequency: 1-2x per week.  Duration in weeks:  4  Plan details:  UPOC: 05/04/2025      Referring provider co-signature:  I have reviewed this plan of care and my co-signature certifies the need for services.     Certification Period: 04/03/2025 to 05/04/2025    Physician Signature: ________________________________ Date: ______________

## 2025-04-03 NOTE — OP THERAPY DAILY TREATMENT
Outpatient Physical Therapy  PROGRESS SUMMARY NOTE      [facility] Outpatient Physical Therapy  [address]  [place] [address]  Phone:  [phone]  Fax:  [phone]    Date of Visit: 04/03/2025    Patient: Melania Johnson  YOB: 1945  MRN: [id number]     Referring Provider: Saeid Alvarado M.D.  [address] [facility]  [address]  [place] [address]   Referring Diagnosis Pain in right shoulder [M25.511];Other chronic pain [G89.29]     {No diagnosis found. (Refresh or delete this SmartLink)}    Rehab Potential: fair    Progress Report Period: 1/21/2025-04/02/2025    Functional Assessment Used          Objective Findings and Assessment:   Patient progression towards goals: Pt has been seen in skilled physical therapy for 10 visits with this provider. Pt has made fair progress towards achieving goals thus far. Pt has shown progress with improved AROM in all directions with very little discomfort, increased painting tolerance, increased ability to perform ADLs with less pain. Pt continues to present with the following limitations:      Short Term Goals:   1. Pt will report independence and compliance with written HEP (met)  2. Pt will demo R shoulder AROM to WFL in order to improve functional mobility (met)   3. Pt will demo improved RUE IR to WFL in order to don/doff coat with no increase in s/s (partially met; still reports that she has some difficulty with this)    Long Term Goals:    1. Pt will report independence and compliance with written HEP (met)  2. Pt will demo improved RUE strength to >/= to 4/5 in order to improve functional mobility and ease with ADLs (met)   3. Pt will report >/= to 70% improvement in sleep quality (in relation to R shoulder pain- met pt does not wake at all in the night with shoulder pain)   4. Pt will demo x10 repetitions of OH lifting with >/= to 8lbs in order to improve functional mobility and ease with ADLs/chores  within the home (in progress)  5. Pt will have significant improvement in QuickDash score with MCID of >/= to 10 points (eval: )       Objective findings and assessment details: Active Range of Motion      Right Shoulder   Flexion: 146 degrees with pain  Abduction: 160 degrees with pain  External rotation BTH: Active external rotation behind the head: R hand to T3   Internal rotation BTB: Active internal rotation behind the back: R hand to T12. with pain     Strength:       Left Shoulder   Normal muscle strength     Right Shoulder   Planes of Motion   External rotation at 0°:4 (immediate pain)   Internal rotation at 0°: 4+         Tests      Right Shoulder   Negative drop arm and Hawkin's.      Goals:   Short Term Goals:   1. Pt will demo improved RUE IR to WFL in order to don/doff coat with no increase in s/s    Short term goal time span:  1-2 weeks      Long Term Goals:    1. Pt will demo improved RUE strength to >/= to 4/5 in order to improve functional mobility and ease with ADLs  2..  Pt will demo x10 repetitions of OH lifting with >/= to 8lbs in order to improve functional mobility and ease with ADLs/chores within the home     Long term goal time span:  2-4 weeks    Plan:   Planned therapy interventions:  E Stim Unattended (CPT 65294), Neuromuscular Re-education (CPT 69625), Therapeutic Activities (CPT 48703), Therapeutic Exercise (CPT 26153) and Manual Therapy (CPT 14901)  Frequency: 1-2x per week.      Referring provider co-signature:  I have reviewed this plan of care and my co-signature certifies the need for services.     Certification Period: 04/03/2025 to 05/30/2025    Physician Signature: ________________________________ Date: ______________          Exercises/Treatment

## 2025-04-03 NOTE — OP THERAPY DAILY TREATMENT
"  Outpatient Physical Therapy  DAILY TREATMENT     [facility] Outpatient Physical Therapy  [address]  [place] [address]  Phone:  [phone]  Fax:  [phone]    Date: 04/03/2025    Patient: Melania Johnson  YOB: 1945  MRN: [id number]     Time Calculation    Start time: 1510  Stop time: 1550 Time Calculation (min): 40 minutes         Chief Complaint: Shoulder Problem    Visit #: 10    SUBJECTIVE: see progress note      OBJECTIVE:  Current objective measures:  see progress note         Therapeutic Exercises (CPT 15064):     1. UBE, x5 minutes    4. progress note, x20 mins    6. pec stretch, 2x 30\"    9. rows, 2x 10 BTB    10. scaption, 2x 10 OTB      Therapeutic Exercise Summary: Pt performed these exercises with instruction and SPV.  Provided handout of these exercises for daily HEP.     Access Code: [email or web address]  URL: [email or web address]  Date: 01/21/2025  Prepared by: Cyndi Mcneill    Exercises  - Supine Shoulder Flexion Extension AAROM with Dowel  - 3 x daily - 7 x weekly - 2 sets - 10 reps  - Standing shoulder flexion wall slides  - 3 x daily - 7 x weekly - 2 sets - 10 reps  - Standing Shoulder Abduction Slides at Wall  - 3 x daily - 7 x weekly - 2 sets - 10 reps    Time-based treatments/modalities:    Physical Therapy Timed Treatment Charges  Therapeutic exercise minutes (CPT 06714): 40 minutes  ASSESSMENT:   Response to treatment: see progress note    PLAN/RECOMMENDATIONS:   Plan for treatment: therapy treatment to continue next visit.  Planned interventions for next visit: continue with current treatment.         "

## 2025-04-10 ENCOUNTER — PHYSICAL THERAPY (OUTPATIENT)
Dept: PHYSICAL THERAPY | Facility: MEDICAL CENTER | Age: 80
End: 2025-04-10
Attending: FAMILY MEDICINE
Payer: MEDICARE

## 2025-04-10 DIAGNOSIS — M25.511 CHRONIC RIGHT SHOULDER PAIN: ICD-10-CM

## 2025-04-10 DIAGNOSIS — G89.29 CHRONIC RIGHT SHOULDER PAIN: ICD-10-CM

## 2025-04-10 PROCEDURE — 97110 THERAPEUTIC EXERCISES: CPT

## 2025-04-10 PROCEDURE — 97140 MANUAL THERAPY 1/> REGIONS: CPT

## 2025-04-10 NOTE — OP THERAPY DAILY TREATMENT
"  Outpatient Physical Therapy  DAILY TREATMENT     [facility] Outpatient Physical Therapy  [address]  [place] [address]  Phone:  [phone]  Fax:  [phone]    Date: 04/10/2025    Patient: Melania Johnson  YOB: 1945  MRN: [id number]     Time Calculation    Start time: 1513  Stop time: 1552 Time Calculation (min): 39 minutes         Chief Complaint: Shoulder Problem    Visit #: 11    SUBJECTIVE:  Pt states that she is doing well. Had some personal stuff arise this week which was emotionally draining. Her shoulder is doing good.     OBJECTIVE:  Current objective measures:           Therapeutic Exercises (CPT 77802):     1. UBE, x5 minutes    6. pec stretch, 2x 30\"    7. supine open book    8. supine angels, discontinued due to tingling in LUE    9. rows, 2x 10 BTB    10. scaption, 2x 10 OTB    11. SAPD, 2x 10 PTB      Therapeutic Exercise Summary: Pt performed these exercises with instruction and SPV.  Provided handout of these exercises for daily HEP.     Access Code: [email or web address]  URL: [email or web address]  Date: 01/21/2025  Prepared by: Cyndi Mcneill    Exercises  - Supine Shoulder Flexion Extension AAROM with Dowel  - 3 x daily - 7 x weekly - 2 sets - 10 reps  - Standing shoulder flexion wall slides  - 3 x daily - 7 x weekly - 2 sets - 10 reps  - Standing Shoulder Abduction Slides at Wall  - 3 x daily - 7 x weekly - 2 sets - 10 reps    Therapeutic Treatments and Modalities:     1. Manual Therapy (CPT 29531), PROM with LAD to GHJ in all planes of motion    Time-based treatments/modalities:    Physical Therapy Timed Treatment Charges  Manual therapy minutes (CPT 40634): 10 minutes  Therapeutic exercise minutes (CPT 07374): 29 minutes      ASSESSMENT:   Response to treatment: Pt with good tolerance to today's session. PROM at beginning of session to address reported stiffness following UBE. Pt with good PROM. Mild discomfort noted during supine angels " in LUE with ulnar nerve tingling due to long history of ulnar nerve compression. Otherwise, all familiar periscapular and RTC strengthening was tolerated well with appropriate fatigue at end of session.     PLAN/RECOMMENDATIONS:   Plan for treatment: therapy treatment to continue next visit.  Planned interventions for next visit: continue with current treatment.

## 2025-04-14 ENCOUNTER — PHYSICAL THERAPY (OUTPATIENT)
Dept: PHYSICAL THERAPY | Facility: MEDICAL CENTER | Age: 80
End: 2025-04-14
Attending: FAMILY MEDICINE
Payer: MEDICARE

## 2025-04-14 DIAGNOSIS — M25.511 CHRONIC RIGHT SHOULDER PAIN: ICD-10-CM

## 2025-04-14 DIAGNOSIS — G89.29 CHRONIC RIGHT SHOULDER PAIN: ICD-10-CM

## 2025-04-14 PROCEDURE — 97140 MANUAL THERAPY 1/> REGIONS: CPT

## 2025-04-14 PROCEDURE — 97110 THERAPEUTIC EXERCISES: CPT

## 2025-04-23 PROCEDURE — RXMED WILLOW AMBULATORY MEDICATION CHARGE: Performed by: FAMILY MEDICINE

## 2025-04-23 NOTE — OP THERAPY DAILY TREATMENT
"  Outpatient Physical Therapy  DAILY TREATMENT     [facility] Outpatient Physical Therapy  [address]  [place] [address]  Phone:  [phone]  Fax:  [phone]    Date: 04/24/2025    Patient: Melania Johnson  YOB: 1945  MRN: [id number]     Time Calculation    Start time: 0930  Stop time: 1010 Time Calculation (min): 40 minutes         Chief Complaint: Shoulder Problem    Visit #: 13    SUBJECTIVE:  Pt states that she would like to hold on therapy for a while due family circumstances and busy schedule. Is feeling her shoulder is at a good place at the moment.     OBJECTIVE:  Current objective measures:           Therapeutic Exercises (CPT 02755):     1. UBE, x5 minutes    6. pec stretch, 2x 30\"    7. supine open book    8. supine angels, discontinued due to tingling in LUE    9. rows, 2x 10 BTB    10. scaption, 2x 10 OTB    11. SAPD, 2x 10 PTB      Therapeutic Exercise Summary: Pt performed these exercises with instruction and SPV.  Provided handout of these exercises for daily HEP.     Access Code: [email or web address]  URL: [email or web address]  Date: 01/21/2025  Prepared by: Cyndi Mcneill    Exercises  - Supine Shoulder Flexion Extension AAROM with Dowel  - 3 x daily - 7 x weekly - 2 sets - 10 reps  - Standing shoulder flexion wall slides  - 3 x daily - 7 x weekly - 2 sets - 10 reps  - Standing Shoulder Abduction Slides at Wall  - 3 x daily - 7 x weekly - 2 sets - 10 reps    Therapeutic Treatments and Modalities:     1. Manual Therapy (CPT 96204), PROM with LAD to GHJ in all planes of motion    Time-based treatments/modalities:    Physical Therapy Timed Treatment Charges  Manual therapy minutes (CPT 94959): 10 minutes  Therapeutic exercise minutes (CPT 81283): 30 minutes  ASSESSMENT:   Response to treatment:  Pt with good tolerance to today's session. Pt tolerated all periscapular strengthening well. Reviewed appropriate IND HEP to continue while pt is taking " formal break for skilled PT services, will call in 1 month to follow up. If pt continues to do well and still wishes to progress outside of formal PT sessions, plan d/c.     PLAN/RECOMMENDATIONS:   Plan for treatment: therapy treatment to continue next visit.  Planned interventions for next visit: continue with current treatment.

## 2025-04-24 ENCOUNTER — PHYSICAL THERAPY (OUTPATIENT)
Dept: PHYSICAL THERAPY | Facility: MEDICAL CENTER | Age: 80
End: 2025-04-24
Attending: FAMILY MEDICINE
Payer: MEDICARE

## 2025-04-24 DIAGNOSIS — M25.511 CHRONIC RIGHT SHOULDER PAIN: ICD-10-CM

## 2025-04-24 DIAGNOSIS — G89.29 CHRONIC RIGHT SHOULDER PAIN: ICD-10-CM

## 2025-04-24 PROCEDURE — 97140 MANUAL THERAPY 1/> REGIONS: CPT

## 2025-04-24 PROCEDURE — 97110 THERAPEUTIC EXERCISES: CPT

## 2025-05-02 ENCOUNTER — PHARMACY VISIT (OUTPATIENT)
Dept: PHARMACY | Facility: MEDICAL CENTER | Age: 80
End: 2025-05-02
Payer: COMMERCIAL

## 2025-06-23 ENCOUNTER — OFFICE VISIT (OUTPATIENT)
Dept: MEDICAL GROUP | Facility: LAB | Age: 80
End: 2025-06-23
Payer: MEDICARE

## 2025-06-23 VITALS
BODY MASS INDEX: 31.58 KG/M2 | TEMPERATURE: 97.8 F | WEIGHT: 185 LBS | DIASTOLIC BLOOD PRESSURE: 74 MMHG | RESPIRATION RATE: 14 BRPM | SYSTOLIC BLOOD PRESSURE: 124 MMHG | HEART RATE: 67 BPM | OXYGEN SATURATION: 95 % | HEIGHT: 64 IN

## 2025-06-23 DIAGNOSIS — K21.9 GASTROESOPHAGEAL REFLUX DISEASE, UNSPECIFIED WHETHER ESOPHAGITIS PRESENT: ICD-10-CM

## 2025-06-23 DIAGNOSIS — J45.909 UNCOMPLICATED ASTHMA, UNSPECIFIED ASTHMA SEVERITY, UNSPECIFIED WHETHER PERSISTENT: ICD-10-CM

## 2025-06-23 DIAGNOSIS — L30.9 ECZEMA, UNSPECIFIED TYPE: Primary | ICD-10-CM

## 2025-06-23 DIAGNOSIS — M15.0 PRIMARY OSTEOARTHRITIS INVOLVING MULTIPLE JOINTS: ICD-10-CM

## 2025-06-23 DIAGNOSIS — M85.88 OSTEOPENIA OF LUMBAR SPINE: ICD-10-CM

## 2025-06-23 RX ORDER — TRIAMCINOLONE ACETONIDE 1 MG/G
CREAM TOPICAL
Qty: 80 G | Refills: 0 | Status: SHIPPED | OUTPATIENT
Start: 2025-06-23

## 2025-06-23 RX ORDER — ATENOLOL 50 MG/1
1 TABLET ORAL
COMMUNITY

## 2025-06-23 ASSESSMENT — FIBROSIS 4 INDEX: FIB4 SCORE: 2.13

## 2025-06-23 NOTE — PROGRESS NOTES
Verbal consent was acquired by the patient to use [employer] ambient listening note generation during this visit Yes    Subjective:   Jackie Johnson is a 80 y.o. female here today for   Chief Complaint   Patient presents with    Foot Swelling     History of Present Illness  The patient presents for evaluation of left foot swelling, eczema, arthritis, gastroesophageal reflux disease, and chronic bronchitis and asthma.    She reports severe arthritis in her feet and hands, with a history of high arches since childhood. An x-ray conducted approximately 10 to 15 years ago confirmed the presence of arthritis. Recently, she has been applying Rangel's body butter to her feet, which patient is concerned has possibly resulted in recurrent rashes. Additionally, she reports the development of calluses on her feet. Last week, she experienced significant swelling in her foot, accompanied by severe pain. She attributes this to a lapse in her turmeric intake, which she has since resumed for the past 5 days. However, the swelling persists. She also reports the emergence of a bunion and a protruding toe. She prefers to go barefoot due to discomfort caused by socks. She owns a few pairs of [employer] shoes but finds them uncomfortable with socks. She also wears flip-flops indoors, which lack support.     She has been using Voltaren gel for her hands but discontinued it due to concerns about overmedication. She is considering the use of CBD oil. She takes Tylenol 1 or 2 tablets probably 3 times a week.     She had to give up Advil because of her CPAP as she was having severe nosebleeds last summer. If she is really hurting, sometimes she will take 1 Advil and 1 Tylenol and then she has not been getting the nosebleeds. She still dislikes the CPAP. They want her to wear one over her mouth, but the only one she can get to seal is over her nose. She does not know why the thing is going all night long. She needs a different one, so she  "has like 8 masks. She finally can sleep and can put it right over her nose. Her nose hurt for a while because she had a biopsy and it was kind of pushing right where her glasses hit, but it has finally desensitized.    She has been on omeprazole for an extended period and is concerned about potential bone thinning. A scan conducted 1 to 2 years ago did not reveal any issues. She has never sustained a fracture despite previous falls. She expresses a desire to discontinue omeprazole but experiences immediate burning upon cessation. She takes 2 omeprazole tablets in the evening, half an hour before her other medications. She occasionally uses gas pills for severe symptoms.    She has a history of chronic bronchitis and asthma, which are well-managed with her current medication regimen. She uses albuterol as needed for allergies.    The rash on her feet is itchy but resolves with allergy medication.    SOCIAL HISTORY  - Drinks maybe a glass or a couple of glasses of wine a day      Allergies[1]      Current medicines (including changes today)  Current Medications[2]  She  has a past medical history of Allergy, Anxiety, Arthritis, Asthma, Back pain, Back pain, Bronchitis, Cardiac arrhythmia, Chest pressure (07/11/2022), Chickenpox, Constipation, Cough, COVID-19 (09/20/2024), Depression, Dizziness, Dysrhythmia, Fatigue, GERD (gastroesophageal reflux disease), Hair loss (10/27/2021), History of nonmelanoma skin cancer (03/21/2018), Hypertension, Mumps, Nasal drainage, Obesity, Painful joint, Ringing in ears, Tonsillitis, and Vaginal Papanicolaou smear not required due to history of hysterectomy (02/28/2013).    ROS   ROS  -See HPI     Objective:     Physical Exam:  /74 (BP Location: Left arm, Patient Position: Sitting, BP Cuff Size: Adult)   Pulse 67   Temp 36.6 °C (97.8 °F) (Temporal)   Resp 14   Ht 1.628 m (5' 4.09\")   Wt 83.9 kg (185 lb)   SpO2 95%  Body mass index is 31.66 kg/m².   Const: Vitals above. " Well-appearing.  CV: Inspected/palpated for lower extremity edema. Bilateral dorsalis pedis/posterior tibial pulses palpated, noting below if not 2+. Capillary refill evaluated, noting below if greater than 2 seconds.  Skin: Inspected for rash or skin lesions in area of concern.  Neuro/psych: Sensation to light touch evaluated at web space of great toe (peroneal nerve), medial leg/foot (L4), lateral leg/dorsal foot (L5), lateral foot (S1). Observed for normal mood/affect/insight.  MSK: Evaluated gait, posture, weightbearing status. Examination of bilateral ankles:  - Inspected/palpated bilaterally for warmth, erythema/discoloration, effusion/swelling, deformity, and tenderness of the proximal fibula, malleolae, navicular and tarsal bones, talus (dome, lateral/posterior processes), calcaneus, 5th metatarsal and all other metatarsals, phalanges, peroneus brevis/longus tendons, Achilles tendon, anterior tibiofibular ligament, deltoid ligament, ATFL/CFL/PTFL, and Lisfranc ligament. Syndesmosis squeeze and Mota test were performed.  - Assessed passive/active range of motion bilaterally in dorsiflexion, plantar flexion, inversion, and eversion, noting below for pain or crepitation.  - Assessed muscle strength bilaterally in dorsiflexion, plantar flexion, inversion, and eversion, noting below if less than 5/5 or pain. Observed for muscle atrophy.  - Assessed stability bilaterally at ATFL (anterior drawer), CFL (talar tilt).    All of the above were found to be normal, except:     Results      Assessment and Plan:     Assessment & Plan  Left foot swelling  The left foot swelling is likely due to degenerative changes associated with OA /age. Examination reveals significant calluses and a bunion, consistent with high arches and arthritis.  Treatment plan: Discussed the use of Voltaren gel twice daily and continuation of turmeric regimen. Advised to increase Tylenol dosage to two extra strength tablets twice daily if the  condition worsens.    Eczema  The rash appears to be indicative of eczema, presenting with itching. Physical exam shows rash consistent with eczema.  Treatment plan: Prescription for a topical steroid cream provided for use during flare-ups. Encouraged to continue using emollient cream to keep the skin soft and prevent dryness and itching.    Arthritis  Arthritis in hands and feet causing significant discomfort. Examination reveals soreness in thumb joints and degenerative changes.  Treatment plan: Discussed the use of Voltaren gel twice daily on hands and feet. Consideration of steroid injections in the future if symptoms persist.    Gastroesophageal Reflux Disease (GERD)  Long-term use of omeprazole with concerns about bone health. Previous bone density scan showed slightly thinner bones, but no osteoporosis.  Treatment plan: Advised to reduce omeprazole dosage to one tablet daily and gradually discontinue it. If symptoms persist after 3-4 weeks, advised to resume the medication.    Chronic bronchitis and asthma  Chronic bronchitis and asthma are well-controlled with current medication regimen. Examination indicates stable respiratory status.  Treatment plan: Advised to continue using inhalers as prescribed and use albuterol as needed for acute symptoms. Minimum effective dosing of steroids recommended to avoid overuse.    Orders:  1. Eczema, unspecified type  - triamcinolone acetonide (KENALOG) 0.1 % Cream; Apply to the affected area twice daily.  Dispense: 80 g; Refill: 0    2. Primary osteoarthritis involving multiple joints    3. Osteopenia of lumbar spine    4. Gastroesophageal reflux disease, unspecified whether esophagitis present    5. Uncomplicated asthma, unspecified asthma severity, unspecified whether persistent    Other orders  - Omeprazole Magnesium (PRILOSEC PO)  - atenolol (TENORMIN) 50 MG Tab; Take 1 Tablet by mouth every day.    My total time spent caring for the patient on the day of the  "encounter was 40 minutes.   This does not include time spent on separately billable procedures/tests.      Followup: No follow-ups on file.         PLEASE NOTE: This dictation was created using voice recognition and [employer] ambient listening software. I have made every reasonable attempt to correct obvious errors, but I expect that there are errors of grammar and possibly content that I did not discover before finalizing the note.       [1]   Allergies  Allergen Reactions    Morphine      Lowers heart rate    Erythromycin Nausea    Hydrocodone-Acetaminophen     Mastisol Adhesive [Mastisol] Rash    Meperidine Hcl Unspecified     [08/08/09 UNKNOWN, PLEASE VERIFY]    Nickel Hives    Other Drug      \"All narcotics make me nauseated\"   [2]   Current Outpatient Medications   Medication Sig Dispense Refill    sertraline (ZOLOFT) 100 MG Tab Take 1 tablet by mouth every day 100 Tablet 3    beclomethasone HFA (QVAR REDIHALER) 40 MCG/ACT inhaler Inhale 1 Puff 2 times a day. 10.6 g 3    albuterol 108 (90 Base) MCG/ACT Aero Soln inhalation aerosol Inhale 1 to 2 puffs by mouth every 4 to 6 hours as needed for wheezing and cough 18 g 2    traZODone (DESYREL) 50 MG Tab Take 1/2 tab (cut the tablet in half) by mouth before bed take the 2nd half if not able to sleep 1 hour after first dose. 90 Tablet 3    simvastatin (ZOCOR) 40 MG Tab Take 1 Tablet by mouth every evening. 100 Tablet 3    atenolol (TENORMIN) 50 MG Tab Take 1 Tablet by mouth every day. 100 Tablet 3    omeprazole (PRILOSEC) 20 MG delayed-release capsule Take 2 Capsules by mouth every day. 200 Capsule 3    Omeprazole Magnesium (PRILOSEC PO)       atenolol (TENORMIN) 50 MG Tab Take 1 Tablet by mouth every day.      benzonatate (TESSALON) 100 MG Cap Take 1 Capsule by mouth 3 times a day as needed for Cough. (Patient not taking: Reported on 3/21/2025) 30 Capsule 3    ezetimibe (ZETIA) 10 MG Tab Take 1 Tablet by mouth every day. (Patient not taking: Reported on " 3/21/2025) 100 Tablet 2     No current facility-administered medications for this visit.

## 2025-06-25 PROCEDURE — RXMED WILLOW AMBULATORY MEDICATION CHARGE: Performed by: FAMILY MEDICINE

## 2025-06-27 ENCOUNTER — PHARMACY VISIT (OUTPATIENT)
Dept: PHARMACY | Facility: MEDICAL CENTER | Age: 80
End: 2025-06-27
Payer: COMMERCIAL

## 2025-07-04 PROCEDURE — RXMED WILLOW AMBULATORY MEDICATION CHARGE: Performed by: FAMILY MEDICINE

## 2025-07-07 PROCEDURE — RXMED WILLOW AMBULATORY MEDICATION CHARGE: Performed by: FAMILY MEDICINE

## 2025-07-08 ENCOUNTER — PHARMACY VISIT (OUTPATIENT)
Dept: PHARMACY | Facility: MEDICAL CENTER | Age: 80
End: 2025-07-08
Payer: COMMERCIAL

## 2025-07-16 ENCOUNTER — OFFICE VISIT (OUTPATIENT)
Dept: MEDICAL GROUP | Facility: LAB | Age: 80
End: 2025-07-16
Payer: MEDICARE

## 2025-07-16 VITALS
TEMPERATURE: 98.2 F | OXYGEN SATURATION: 95 % | HEART RATE: 69 BPM | DIASTOLIC BLOOD PRESSURE: 64 MMHG | WEIGHT: 185 LBS | SYSTOLIC BLOOD PRESSURE: 120 MMHG | HEIGHT: 64 IN | BODY MASS INDEX: 31.58 KG/M2 | RESPIRATION RATE: 14 BRPM

## 2025-07-16 DIAGNOSIS — F51.01 PRIMARY INSOMNIA: ICD-10-CM

## 2025-07-16 DIAGNOSIS — K21.9 GASTROESOPHAGEAL REFLUX DISEASE, UNSPECIFIED WHETHER ESOPHAGITIS PRESENT: ICD-10-CM

## 2025-07-16 DIAGNOSIS — R53.83 OTHER FATIGUE: Primary | ICD-10-CM

## 2025-07-16 DIAGNOSIS — H92.01 EARACHE ON RIGHT: ICD-10-CM

## 2025-07-16 DIAGNOSIS — J02.9 SORE THROAT: ICD-10-CM

## 2025-07-16 PROCEDURE — 3078F DIAST BP <80 MM HG: CPT | Performed by: FAMILY MEDICINE

## 2025-07-16 PROCEDURE — 99214 OFFICE O/P EST MOD 30 MIN: CPT | Performed by: FAMILY MEDICINE

## 2025-07-16 PROCEDURE — 3074F SYST BP LT 130 MM HG: CPT | Performed by: FAMILY MEDICINE

## 2025-07-16 ASSESSMENT — FIBROSIS 4 INDEX: FIB4 SCORE: 2.13

## 2025-07-16 NOTE — PROGRESS NOTES
Verbal consent was acquired by the patient to use [employer] ambient listening note generation during this visit Yes    Subjective:   Jackie Johnson is a 80 y.o. female here today for   Chief Complaint   Patient presents with    UTI     Slight burn,       Fatigue     Feeling weird      History of Present Illness  The patient is a 80-year-old female with multiple comorbidities, including major depressive disorder (MDD), osteopenia, severe asthma, and obstructive sleep apnea (LIZETH). She is here today with concerns about possible fatigue and a urinary tract infection (UTI).    She reports feeling unwell since Sunday when she experienced near-fainting due to heat exposure while walking in a mall. Prior to this incident, she had been feeling mildly short of breath for the past week, making it difficult to take deep breaths. She uses a CPAP machine that covers her nose but not her mouth, leading to daily mouth and throat discomfort upon waking. This morning, she woke up with a severe sore throat and an earache, which improved slightly as the day progressed. She reports no fevers or chills. She acknowledges not drinking enough water and suspects dehydration. She is concerned about her breathing and heart rate. She has been using both inhalers twice daily and rinses her mouth after using Qvar. She has allergies and started taking an allergy pill a few days ago due to worsening symptoms. She also uses nasal saline spray and AYR gel, but not regularly. She has been under significant stress recently.    She has been coughing frequently and experiencing heartburn. She attempted to discontinue omeprazole but resumed it last night, taking two doses in the afternoon, which provided relief.    She has been taking various medications at night to aid sleep, including a full dose of a sleeping pill and two melatonin gummies. Her sleep has been disrupted for over a month, with her waking up at 5:00 AM daily, reducing her sleep  "duration from 7-8 hours to 6 hours.    Social History:  Sleep: She has been waking up at 5:00 AM daily for over a month, reducing her sleep duration from 7-8 hours to 6 hours.      Allergies[1]      Current medicines (including changes today)  Current Medications[2]  She  has a past medical history of Allergy, Anxiety, Arthritis, Asthma, Back pain, Back pain, Bronchitis, Cardiac arrhythmia, Chest pressure (07/11/2022), Chickenpox, Constipation, Cough, COVID-19 (09/20/2024), Depression, Dizziness, Dysrhythmia, Fatigue, GERD (gastroesophageal reflux disease), Hair loss (10/27/2021), History of nonmelanoma skin cancer (03/21/2018), Hypertension, Mumps, Nasal drainage, Obesity, Painful joint, Ringing in ears, Tonsillitis, and Vaginal Papanicolaou smear not required due to history of hysterectomy (02/28/2013).    ROS   ROS  -See HPI     Objective:     Physical Exam:  /64 (BP Location: Left arm, Patient Position: Sitting, BP Cuff Size: Adult)   Pulse 69   Temp 36.8 °C (98.2 °F) (Temporal)   Resp 14   Ht 1.628 m (5' 4.09\")   Wt 83.9 kg (185 lb)   SpO2 95%  Body mass index is 31.66 kg/m².   Constitutional: Alert, no distress.  Skin: Warm, dry, good turgor, no rashes in visible areas.  Eye: Equal, round and reactive, conjunctiva clear, lids normal.  ENMT: TM's clear bilaterally, lips without lesions, good dentition, oropharynx clear.  Neck: Trachea midline, no masses, no thyromegaly. No cervical or supraclavicular lymphadenopathy.  Respiratory: Unlabored respiratory effort, lungs clear to auscultation, no wheezes, no rhonchi.  Cardiovascular: Normal S1, S2, no murmur, no edema.  Abdomen: Soft, non-tender, no masses, no hepatosplenomegaly.  Psych: Alert and oriented x3, normal affect and mood.    Results      Assessment and Plan:     Assessment & Plan  Fatigue  Symptoms of fatigue are likely due to dehydration, exacerbated by the use of CPAP and antihistamines. Low blood pressure and heart rate during her walk " "could have contributed to her near-fainting experience.  Treatment plan: Advised to increase water intake and use nasal saline spray three times daily, before and after using the CPAP machine, and once in the middle of the day. Advised to stay indoors during peak heat hours.    Sore throat  Likely due to dryness from the CPAP machine, antihistamines and possibly a viral infection.  Treatment plan: Advised to continue using nasal saline spray and to stay hydrated.    Earache  May be related to the same dryness causing her sore throat.  Treatment plan: Advised to continue using nasal saline spray and to stay hydrated.    Asthma  Advised to continue using inhalers twice a day and to rinse mouth out after using the Qvar inhaler.    Gastroesophageal Reflux Disease (GERD)  Advised to resume omeprazole regimen.    Insomnia  Advised to continue current sleep regimen, including the use of melatonin gummies.    Orders:  1. Other fatigue    2. Sore throat    3. Earache on right    4. Gastroesophageal reflux disease, unspecified whether esophagitis present    5. Primary insomnia        Followup: No follow-ups on file.         PLEASE NOTE: This dictation was created using voice recognition and [employer] ambient listening software. I have made every reasonable attempt to correct obvious errors, but I expect that there are errors of grammar and possibly content that I did not discover before finalizing the note.             [1]   Allergies  Allergen Reactions    Morphine      Lowers heart rate    Erythromycin Nausea    Hydrocodone-Acetaminophen     Mastisol Adhesive [Mastisol] Rash    Meperidine Hcl Unspecified     [08/08/09 UNKNOWN, PLEASE VERIFY]    Nickel Hives    Other Drug      \"All narcotics make me nauseated\"   [2]   Current Outpatient Medications   Medication Sig Dispense Refill    Omeprazole Magnesium (PRILOSEC PO)       atenolol (TENORMIN) 50 MG Tab Take 1 Tablet by mouth every day.      triamcinolone acetonide " (KENALOG) 0.1 % Cream Apply to the affected area twice daily. 80 g 0    sertraline (ZOLOFT) 100 MG Tab Take 1 tablet by mouth every day 100 Tablet 3    beclomethasone HFA (QVAR REDIHALER) 40 MCG/ACT inhaler Inhale 1 Puff 2 times a day. 10.6 g 3    albuterol 108 (90 Base) MCG/ACT Aero Soln inhalation aerosol Inhale 1 to 2 puffs by mouth every 4 to 6 hours as needed for wheezing and cough 18 g 2    traZODone (DESYREL) 50 MG Tab Take 1/2 tab (cut the tablet in half) by mouth before bed take the 2nd half if not able to sleep 1 hour after first dose. 90 Tablet 3    simvastatin (ZOCOR) 40 MG Tab Take 1 Tablet by mouth every evening. 100 Tablet 3    atenolol (TENORMIN) 50 MG Tab Take 1 Tablet by mouth every day. 100 Tablet 3    omeprazole (PRILOSEC) 20 MG delayed-release capsule Take 2 Capsules by mouth every day. 200 Capsule 3     No current facility-administered medications for this visit.

## 2025-07-23 ENCOUNTER — APPOINTMENT (OUTPATIENT)
Dept: RADIOLOGY | Facility: MEDICAL CENTER | Age: 80
End: 2025-07-23
Attending: EMERGENCY MEDICINE
Payer: MEDICARE

## 2025-07-23 ENCOUNTER — HOSPITAL ENCOUNTER (OUTPATIENT)
Facility: MEDICAL CENTER | Age: 80
End: 2025-07-24
Attending: EMERGENCY MEDICINE | Admitting: HOSPITALIST
Payer: MEDICARE

## 2025-07-23 DIAGNOSIS — I10 ESSENTIAL HYPERTENSION: ICD-10-CM

## 2025-07-23 DIAGNOSIS — R06.02 SHORTNESS OF BREATH: ICD-10-CM

## 2025-07-23 DIAGNOSIS — I48.91 ATRIAL FIBRILLATION, UNSPECIFIED TYPE (HCC): Primary | ICD-10-CM

## 2025-07-23 DIAGNOSIS — I48.91 ATRIAL FIBRILLATION WITH RAPID VENTRICULAR RESPONSE (HCC): ICD-10-CM

## 2025-07-23 PROBLEM — E66.9 OBESITY (BMI 30-39.9): Status: RESOLVED | Noted: 2023-10-06 | Resolved: 2025-07-23

## 2025-07-23 PROBLEM — R19.7 DIARRHEA: Status: RESOLVED | Noted: 2023-09-08 | Resolved: 2025-07-23

## 2025-07-23 PROBLEM — M85.89 OSTEOPENIA OF MULTIPLE SITES: Status: RESOLVED | Noted: 2025-03-21 | Resolved: 2025-07-23

## 2025-07-23 PROBLEM — I70.90 ATHEROSCLEROSIS: Status: RESOLVED | Noted: 2023-10-06 | Resolved: 2025-07-23

## 2025-07-23 PROBLEM — R74.8 ELEVATED LIVER ENZYMES: Status: ACTIVE | Noted: 2025-07-23

## 2025-07-23 LAB
ALBUMIN SERPL BCP-MCNC: 3.8 G/DL (ref 3.2–4.9)
ALBUMIN/GLOB SERPL: 1.4 G/DL
ALP SERPL-CCNC: 119 U/L (ref 30–99)
ALT SERPL-CCNC: 174 U/L (ref 2–50)
ANION GAP SERPL CALC-SCNC: 12 MMOL/L (ref 7–16)
APTT PPP: 28.6 SEC (ref 24.7–36)
AST SERPL-CCNC: 87 U/L (ref 12–45)
BASOPHILS # BLD AUTO: 0.6 % (ref 0–1.8)
BASOPHILS # BLD: 0.05 K/UL (ref 0–0.12)
BILIRUB SERPL-MCNC: 0.7 MG/DL (ref 0.1–1.5)
BUN SERPL-MCNC: 13 MG/DL (ref 8–22)
CALCIUM ALBUM COR SERPL-MCNC: 9.2 MG/DL (ref 8.5–10.5)
CALCIUM SERPL-MCNC: 9 MG/DL (ref 8.5–10.5)
CHLORIDE SERPL-SCNC: 103 MMOL/L (ref 96–112)
CO2 SERPL-SCNC: 24 MMOL/L (ref 20–33)
CREAT SERPL-MCNC: 0.62 MG/DL (ref 0.5–1.4)
EKG IMPRESSION: NORMAL
EOSINOPHIL # BLD AUTO: 0.06 K/UL (ref 0–0.51)
EOSINOPHIL NFR BLD: 0.7 % (ref 0–6.9)
ERYTHROCYTE [DISTWIDTH] IN BLOOD BY AUTOMATED COUNT: 48.2 FL (ref 35.9–50)
ETHANOL BLD-MCNC: <10.1 MG/DL
GFR SERPLBLD CREATININE-BSD FMLA CKD-EPI: 91 ML/MIN/1.73 M 2
GGT SERPL-CCNC: 70 U/L (ref 7–34)
GLOBULIN SER CALC-MCNC: 2.7 G/DL (ref 1.9–3.5)
GLUCOSE SERPL-MCNC: 109 MG/DL (ref 65–99)
HCT VFR BLD AUTO: 37.2 % (ref 37–47)
HGB BLD-MCNC: 12.1 G/DL (ref 12–16)
IMM GRANULOCYTES # BLD AUTO: 0.02 K/UL (ref 0–0.11)
IMM GRANULOCYTES NFR BLD AUTO: 0.2 % (ref 0–0.9)
INR PPP: 0.97 (ref 0.87–1.13)
LIPASE SERPL-CCNC: 19 U/L (ref 11–82)
LYMPHOCYTES # BLD AUTO: 0.68 K/UL (ref 1–4.8)
LYMPHOCYTES NFR BLD: 8 % (ref 22–41)
MAGNESIUM SERPL-MCNC: 2 MG/DL (ref 1.5–2.5)
MCH RBC QN AUTO: 30.9 PG (ref 27–33)
MCHC RBC AUTO-ENTMCNC: 32.5 G/DL (ref 32.2–35.5)
MCV RBC AUTO: 94.9 FL (ref 81.4–97.8)
MONOCYTES # BLD AUTO: 0.53 K/UL (ref 0–0.85)
MONOCYTES NFR BLD AUTO: 6.3 % (ref 0–13.4)
NEUTROPHILS # BLD AUTO: 7.11 K/UL (ref 1.82–7.42)
NEUTROPHILS NFR BLD: 84.2 % (ref 44–72)
NRBC # BLD AUTO: 0 K/UL
NRBC BLD-RTO: 0 /100 WBC (ref 0–0.2)
NT-PROBNP SERPL IA-MCNC: 1521 PG/ML (ref 0–125)
PLATELET # BLD AUTO: 217 K/UL (ref 164–446)
PMV BLD AUTO: 10.4 FL (ref 9–12.9)
POTASSIUM SERPL-SCNC: 4.1 MMOL/L (ref 3.6–5.5)
PROT SERPL-MCNC: 6.5 G/DL (ref 6–8.2)
PROTHROMBIN TIME: 13.2 SEC (ref 12–14.6)
RBC # BLD AUTO: 3.92 M/UL (ref 4.2–5.4)
SODIUM SERPL-SCNC: 139 MMOL/L (ref 135–145)
TROPONIN T SERPL-MCNC: 10 NG/L (ref 6–19)
TROPONIN T SERPL-MCNC: 12 NG/L (ref 6–19)
TROPONIN T SERPL-MCNC: 14 NG/L (ref 6–19)
TSH SERPL DL<=0.005 MIU/L-ACNC: 2.02 UIU/ML (ref 0.38–5.33)
WBC # BLD AUTO: 8.5 K/UL (ref 4.8–10.8)

## 2025-07-23 PROCEDURE — 85730 THROMBOPLASTIN TIME PARTIAL: CPT

## 2025-07-23 PROCEDURE — 700102 HCHG RX REV CODE 250 W/ 637 OVERRIDE(OP): Performed by: HOSPITALIST

## 2025-07-23 PROCEDURE — G0378 HOSPITAL OBSERVATION PER HR: HCPCS

## 2025-07-23 PROCEDURE — 83690 ASSAY OF LIPASE: CPT

## 2025-07-23 PROCEDURE — 99222 1ST HOSP IP/OBS MODERATE 55: CPT | Mod: FS | Performed by: INTERNAL MEDICINE

## 2025-07-23 PROCEDURE — 96376 TX/PRO/DX INJ SAME DRUG ADON: CPT

## 2025-07-23 PROCEDURE — A9270 NON-COVERED ITEM OR SERVICE: HCPCS | Performed by: EMERGENCY MEDICINE

## 2025-07-23 PROCEDURE — 700111 HCHG RX REV CODE 636 W/ 250 OVERRIDE (IP): Mod: JZ | Performed by: EMERGENCY MEDICINE

## 2025-07-23 PROCEDURE — 96374 THER/PROPH/DIAG INJ IV PUSH: CPT

## 2025-07-23 PROCEDURE — 82977 ASSAY OF GGT: CPT

## 2025-07-23 PROCEDURE — 96372 THER/PROPH/DIAG INJ SC/IM: CPT | Mod: XU

## 2025-07-23 PROCEDURE — 700102 HCHG RX REV CODE 250 W/ 637 OVERRIDE(OP): Performed by: NURSE PRACTITIONER

## 2025-07-23 PROCEDURE — 700101 HCHG RX REV CODE 250: Performed by: EMERGENCY MEDICINE

## 2025-07-23 PROCEDURE — 85025 COMPLETE CBC W/AUTO DIFF WBC: CPT

## 2025-07-23 PROCEDURE — 80053 COMPREHEN METABOLIC PANEL: CPT

## 2025-07-23 PROCEDURE — 96375 TX/PRO/DX INJ NEW DRUG ADDON: CPT

## 2025-07-23 PROCEDURE — 36415 COLL VENOUS BLD VENIPUNCTURE: CPT

## 2025-07-23 PROCEDURE — 94760 N-INVAS EAR/PLS OXIMETRY 1: CPT

## 2025-07-23 PROCEDURE — 700105 HCHG RX REV CODE 258: Performed by: HOSPITALIST

## 2025-07-23 PROCEDURE — 82077 ASSAY SPEC XCP UR&BREATH IA: CPT

## 2025-07-23 PROCEDURE — 700102 HCHG RX REV CODE 250 W/ 637 OVERRIDE(OP): Performed by: EMERGENCY MEDICINE

## 2025-07-23 PROCEDURE — A9270 NON-COVERED ITEM OR SERVICE: HCPCS | Performed by: HOSPITALIST

## 2025-07-23 PROCEDURE — 83735 ASSAY OF MAGNESIUM: CPT

## 2025-07-23 PROCEDURE — A9270 NON-COVERED ITEM OR SERVICE: HCPCS | Performed by: NURSE PRACTITIONER

## 2025-07-23 PROCEDURE — 700111 HCHG RX REV CODE 636 W/ 250 OVERRIDE (IP): Mod: JZ | Performed by: HOSPITALIST

## 2025-07-23 PROCEDURE — 99223 1ST HOSP IP/OBS HIGH 75: CPT | Mod: 25 | Performed by: HOSPITALIST

## 2025-07-23 PROCEDURE — 700111 HCHG RX REV CODE 636 W/ 250 OVERRIDE (IP): Mod: JZ | Performed by: NURSE PRACTITIONER

## 2025-07-23 PROCEDURE — 83880 ASSAY OF NATRIURETIC PEPTIDE: CPT

## 2025-07-23 PROCEDURE — 80074 ACUTE HEPATITIS PANEL: CPT

## 2025-07-23 PROCEDURE — 76705 ECHO EXAM OF ABDOMEN: CPT

## 2025-07-23 PROCEDURE — 84484 ASSAY OF TROPONIN QUANT: CPT

## 2025-07-23 PROCEDURE — 93005 ELECTROCARDIOGRAM TRACING: CPT | Mod: TC

## 2025-07-23 PROCEDURE — 85610 PROTHROMBIN TIME: CPT

## 2025-07-23 PROCEDURE — 71045 X-RAY EXAM CHEST 1 VIEW: CPT

## 2025-07-23 PROCEDURE — 93005 ELECTROCARDIOGRAM TRACING: CPT | Mod: TC | Performed by: EMERGENCY MEDICINE

## 2025-07-23 PROCEDURE — 84443 ASSAY THYROID STIM HORMONE: CPT

## 2025-07-23 PROCEDURE — 99285 EMERGENCY DEPT VISIT HI MDM: CPT

## 2025-07-23 RX ORDER — METOPROLOL TARTRATE 1 MG/ML
5 INJECTION, SOLUTION INTRAVENOUS ONCE
Status: COMPLETED | OUTPATIENT
Start: 2025-07-23 | End: 2025-07-23

## 2025-07-23 RX ORDER — FLUTICASONE PROPIONATE 44 UG/1
2 AEROSOL, METERED RESPIRATORY (INHALATION)
Status: DISCONTINUED | OUTPATIENT
Start: 2025-07-23 | End: 2025-07-23

## 2025-07-23 RX ORDER — FLUTICASONE PROPIONATE 44 UG/1
2 AEROSOL, METERED RESPIRATORY (INHALATION) EVERY 12 HOURS
Status: DISCONTINUED | OUTPATIENT
Start: 2025-07-23 | End: 2025-07-24 | Stop reason: HOSPADM

## 2025-07-23 RX ORDER — MELATONIN 5 MG
10 TABLET,CHEWABLE ORAL NIGHTLY PRN
COMMUNITY

## 2025-07-23 RX ORDER — ACETAMINOPHEN 325 MG/1
650 TABLET ORAL EVERY 6 HOURS PRN
Status: DISCONTINUED | OUTPATIENT
Start: 2025-07-23 | End: 2025-07-24 | Stop reason: HOSPADM

## 2025-07-23 RX ORDER — OXYCODONE HYDROCHLORIDE 5 MG/1
2.5 TABLET ORAL
Refills: 0 | Status: DISCONTINUED | OUTPATIENT
Start: 2025-07-23 | End: 2025-07-24 | Stop reason: HOSPADM

## 2025-07-23 RX ORDER — POLYETHYLENE GLYCOL 3350 17 G/17G
1 POWDER, FOR SOLUTION ORAL
Status: DISCONTINUED | OUTPATIENT
Start: 2025-07-23 | End: 2025-07-24 | Stop reason: HOSPADM

## 2025-07-23 RX ORDER — OXYCODONE HYDROCHLORIDE 5 MG/1
5 TABLET ORAL
Refills: 0 | Status: DISCONTINUED | OUTPATIENT
Start: 2025-07-23 | End: 2025-07-24 | Stop reason: HOSPADM

## 2025-07-23 RX ORDER — METOPROLOL TARTRATE 25 MG/1
25 TABLET, FILM COATED ORAL EVERY 6 HOURS
Status: DISCONTINUED | OUTPATIENT
Start: 2025-07-23 | End: 2025-07-24

## 2025-07-23 RX ORDER — HYDRALAZINE HYDROCHLORIDE 20 MG/ML
10 INJECTION INTRAMUSCULAR; INTRAVENOUS EVERY 4 HOURS PRN
Status: DISCONTINUED | OUTPATIENT
Start: 2025-07-23 | End: 2025-07-24 | Stop reason: HOSPADM

## 2025-07-23 RX ORDER — ASPIRIN 81 MG/1
324 TABLET, CHEWABLE ORAL ONCE
Status: COMPLETED | OUTPATIENT
Start: 2025-07-23 | End: 2025-07-23

## 2025-07-23 RX ORDER — SIMVASTATIN 20 MG
40 TABLET ORAL EVERY EVENING
Status: DISCONTINUED | OUTPATIENT
Start: 2025-07-23 | End: 2025-07-23

## 2025-07-23 RX ORDER — ONDANSETRON 4 MG/1
4 TABLET, ORALLY DISINTEGRATING ORAL EVERY 4 HOURS PRN
Status: DISCONTINUED | OUTPATIENT
Start: 2025-07-23 | End: 2025-07-24 | Stop reason: HOSPADM

## 2025-07-23 RX ORDER — ONDANSETRON 2 MG/ML
4 INJECTION INTRAMUSCULAR; INTRAVENOUS EVERY 4 HOURS PRN
Status: DISCONTINUED | OUTPATIENT
Start: 2025-07-23 | End: 2025-07-24 | Stop reason: HOSPADM

## 2025-07-23 RX ORDER — EZETIMIBE 10 MG/1
10 TABLET ORAL DAILY
COMMUNITY

## 2025-07-23 RX ORDER — METOPROLOL TARTRATE 25 MG/1
25 TABLET, FILM COATED ORAL TWICE DAILY
Status: DISCONTINUED | OUTPATIENT
Start: 2025-07-23 | End: 2025-07-23

## 2025-07-23 RX ORDER — ASPIRIN 300 MG/1
300 SUPPOSITORY RECTAL ONCE
Status: COMPLETED | OUTPATIENT
Start: 2025-07-23 | End: 2025-07-23

## 2025-07-23 RX ORDER — MORPHINE SULFATE 4 MG/ML
2 INJECTION INTRAVENOUS
Status: DISCONTINUED | OUTPATIENT
Start: 2025-07-23 | End: 2025-07-24 | Stop reason: HOSPADM

## 2025-07-23 RX ORDER — SODIUM CHLORIDE 9 MG/ML
INJECTION, SOLUTION INTRAVENOUS CONTINUOUS
Status: ACTIVE | OUTPATIENT
Start: 2025-07-23 | End: 2025-07-23

## 2025-07-23 RX ORDER — DIGOXIN 0.25 MG/ML
500 INJECTION INTRAMUSCULAR; INTRAVENOUS ONCE
Status: COMPLETED | OUTPATIENT
Start: 2025-07-23 | End: 2025-07-23

## 2025-07-23 RX ORDER — OMEPRAZOLE 20 MG/1
40 CAPSULE, DELAYED RELEASE ORAL NIGHTLY
Status: DISCONTINUED | OUTPATIENT
Start: 2025-07-23 | End: 2025-07-24 | Stop reason: HOSPADM

## 2025-07-23 RX ORDER — DILTIAZEM HYDROCHLORIDE 5 MG/ML
20 INJECTION INTRAVENOUS ONCE
Status: COMPLETED | OUTPATIENT
Start: 2025-07-23 | End: 2025-07-23

## 2025-07-23 RX ORDER — AMOXICILLIN 250 MG
2 CAPSULE ORAL EVERY EVENING
Status: DISCONTINUED | OUTPATIENT
Start: 2025-07-23 | End: 2025-07-24 | Stop reason: HOSPADM

## 2025-07-23 RX ORDER — ENOXAPARIN SODIUM 100 MG/ML
80 INJECTION SUBCUTANEOUS EVERY 12 HOURS
Status: COMPLETED | OUTPATIENT
Start: 2025-07-23 | End: 2025-07-23

## 2025-07-23 RX ORDER — TRAZODONE HYDROCHLORIDE 50 MG/1
50 TABLET ORAL NIGHTLY
Status: DISCONTINUED | OUTPATIENT
Start: 2025-07-23 | End: 2025-07-24 | Stop reason: HOSPADM

## 2025-07-23 RX ORDER — EZETIMIBE 10 MG/1
10 TABLET ORAL DAILY
Status: DISCONTINUED | OUTPATIENT
Start: 2025-07-23 | End: 2025-07-23

## 2025-07-23 RX ORDER — LACTULOSE 10 G/15ML
30 SOLUTION ORAL 2 TIMES DAILY
Status: DISCONTINUED | OUTPATIENT
Start: 2025-07-23 | End: 2025-07-24 | Stop reason: HOSPADM

## 2025-07-23 RX ORDER — METOPROLOL TARTRATE 1 MG/ML
5 INJECTION, SOLUTION INTRAVENOUS
Status: COMPLETED | OUTPATIENT
Start: 2025-07-23 | End: 2025-07-23

## 2025-07-23 RX ORDER — METOPROLOL TARTRATE 1 MG/ML
5 INJECTION, SOLUTION INTRAVENOUS
Status: DISCONTINUED | OUTPATIENT
Start: 2025-07-23 | End: 2025-07-23

## 2025-07-23 RX ADMIN — OMEPRAZOLE 40 MG: 20 CAPSULE, DELAYED RELEASE ORAL at 20:23

## 2025-07-23 RX ADMIN — METOPROLOL TARTRATE 5 MG: 5 INJECTION INTRAVENOUS at 13:05

## 2025-07-23 RX ADMIN — ONDANSETRON 4 MG: 2 INJECTION INTRAMUSCULAR; INTRAVENOUS at 13:57

## 2025-07-23 RX ADMIN — ASPIRIN 81 MG CHEWABLE TABLET 324 MG: 81 TABLET CHEWABLE at 10:12

## 2025-07-23 RX ADMIN — METOPROLOL TARTRATE 25 MG: 25 TABLET, FILM COATED ORAL at 15:12

## 2025-07-23 RX ADMIN — HYOSCYAMINE SULFATE 30 ML: 0.12 ELIXIR ORAL at 10:11

## 2025-07-23 RX ADMIN — METOPROLOL TARTRATE 5 MG: 5 INJECTION INTRAVENOUS at 13:10

## 2025-07-23 RX ADMIN — METOROPROLOL TARTRATE 5 MG: 5 INJECTION, SOLUTION INTRAVENOUS at 10:40

## 2025-07-23 RX ADMIN — METOPROLOL TARTRATE 5 MG: 5 INJECTION INTRAVENOUS at 12:59

## 2025-07-23 RX ADMIN — ENOXAPARIN SODIUM 80 MG: 100 INJECTION SUBCUTANEOUS at 11:26

## 2025-07-23 RX ADMIN — APIXABAN 5 MG: 5 TABLET, FILM COATED ORAL at 12:55

## 2025-07-23 RX ADMIN — SERTRALINE 100 MG: 50 TABLET, FILM COATED ORAL at 12:55

## 2025-07-23 RX ADMIN — METOPROLOL TARTRATE 25 MG: 25 TABLET, FILM COATED ORAL at 12:55

## 2025-07-23 RX ADMIN — SODIUM CHLORIDE: 9 INJECTION, SOLUTION INTRAVENOUS at 14:02

## 2025-07-23 RX ADMIN — OXYCODONE 5 MG: 5 TABLET ORAL at 19:27

## 2025-07-23 RX ADMIN — LACTULOSE 30 ML: 10 SOLUTION ORAL at 17:31

## 2025-07-23 RX ADMIN — FLUTICASONE PROPIONATE 88 MCG: 44 AEROSOL, METERED RESPIRATORY (INHALATION) at 20:24

## 2025-07-23 RX ADMIN — OXYCODONE 5 MG: 5 TABLET ORAL at 13:57

## 2025-07-23 RX ADMIN — METOROPROLOL TARTRATE 5 MG: 5 INJECTION, SOLUTION INTRAVENOUS at 11:25

## 2025-07-23 RX ADMIN — POLYETHYLENE GLYCOL 3350 1 PACKET: 17 POWDER, FOR SOLUTION ORAL at 14:05

## 2025-07-23 RX ADMIN — ONDANSETRON 4 MG: 2 INJECTION INTRAMUSCULAR; INTRAVENOUS at 19:27

## 2025-07-23 RX ADMIN — DILTIAZEM HYDROCHLORIDE 20 MG: 5 INJECTION INTRAVENOUS at 10:11

## 2025-07-23 RX ADMIN — TRAZODONE HYDROCHLORIDE 50 MG: 50 TABLET ORAL at 20:23

## 2025-07-23 RX ADMIN — METOPROLOL TARTRATE 25 MG: 25 TABLET, FILM COATED ORAL at 17:31

## 2025-07-23 RX ADMIN — DIGOXIN 500 MCG: 250 INJECTION, SOLUTION INTRAMUSCULAR; INTRAVENOUS; PARENTERAL at 13:15

## 2025-07-23 SDOH — ECONOMIC STABILITY: TRANSPORTATION INSECURITY
IN THE PAST 12 MONTHS, HAS THE LACK OF TRANSPORTATION KEPT YOU FROM MEDICAL APPOINTMENTS OR FROM GETTING MEDICATIONS?: NO

## 2025-07-23 ASSESSMENT — LIFESTYLE VARIABLES
EVER FELT BAD OR GUILTY ABOUT YOUR DRINKING: NO
TOTAL SCORE: 0
HAVE PEOPLE ANNOYED YOU BY CRITICIZING YOUR DRINKING: NO
AVERAGE NUMBER OF DAYS PER WEEK YOU HAVE A DRINK CONTAINING ALCOHOL: 5
HOW MANY TIMES IN THE PAST YEAR HAVE YOU HAD 5 OR MORE DRINKS IN A DAY: 0
TOTAL SCORE: 0
TOTAL SCORE: 0
HAVE YOU EVER FELT YOU SHOULD CUT DOWN ON YOUR DRINKING: NO
ALCOHOL_USE: YES
DOES PATIENT WANT TO STOP DRINKING: NO
ON A TYPICAL DAY WHEN YOU DRINK ALCOHOL HOW MANY DRINKS DO YOU HAVE: 1
CONSUMPTION TOTAL: NEGATIVE
EVER HAD A DRINK FIRST THING IN THE MORNING TO STEADY YOUR NERVES TO GET RID OF A HANGOVER: NO

## 2025-07-23 ASSESSMENT — SOCIAL DETERMINANTS OF HEALTH (SDOH)

## 2025-07-23 ASSESSMENT — ENCOUNTER SYMPTOMS
ABDOMINAL PAIN: 1
CONSTIPATION: 0
FATIGUE: 0
BRUISES/BLEEDS EASILY: 0
LIGHT-HEADEDNESS: 0
SEIZURES: 0
LOSS OF CONSCIOUSNESS: 0
HEADACHES: 0
CONSTIPATION: 1
WHEEZING: 0
DIAPHORESIS: 0
FOCAL WEAKNESS: 0
COUGH: 0
DIZZINESS: 0
FEVER: 0
NAUSEA: 1
PSYCHIATRIC NEGATIVE: 1
HEARTBURN: 1
VOMITING: 0
DOUBLE VISION: 0
NAUSEA: 0
NERVOUS/ANXIOUS: 0
DIARRHEA: 0
EYES NEGATIVE: 1
PALPITATIONS: 1
NEUROLOGICAL NEGATIVE: 1
SHORTNESS OF BREATH: 1
HEMOPTYSIS: 0
CHILLS: 0
BLOOD IN STOOL: 0
NERVOUS/ANXIOUS: 1
MUSCULOSKELETAL NEGATIVE: 1

## 2025-07-23 ASSESSMENT — PAIN DESCRIPTION - DESCRIPTORS: DESCRIPTORS: CRAMPING;STABBING

## 2025-07-23 ASSESSMENT — PAIN DESCRIPTION - PAIN TYPE
TYPE: ACUTE PAIN

## 2025-07-23 ASSESSMENT — PATIENT HEALTH QUESTIONNAIRE - PHQ9
SUM OF ALL RESPONSES TO PHQ9 QUESTIONS 1 AND 2: 0
1. LITTLE INTEREST OR PLEASURE IN DOING THINGS: NOT AT ALL
2. FEELING DOWN, DEPRESSED, IRRITABLE, OR HOPELESS: NOT AT ALL

## 2025-07-23 ASSESSMENT — COGNITIVE AND FUNCTIONAL STATUS - GENERAL
SUGGESTED CMS G CODE MODIFIER DAILY ACTIVITY: CH
MOBILITY SCORE: 24
DAILY ACTIVITIY SCORE: 24
SUGGESTED CMS G CODE MODIFIER MOBILITY: CH

## 2025-07-23 ASSESSMENT — VISUAL ACUITY: OU: 1

## 2025-07-23 ASSESSMENT — CHA2DS2 SCORE
PRIOR STROKE OR TIA OR THROMBOEMBOLISM: NO
AGE 75 OR GREATER: YES
HYPERTENSION: YES
VASCULAR DISEASE: NO
CHF OR LEFT VENTRICULAR DYSFUNCTION: NO
AGE 65 TO 74: NO
SEX: FEMALE
DIABETES: NO
CHA2DS2 VASC SCORE: 4

## 2025-07-23 ASSESSMENT — FIBROSIS 4 INDEX
FIB4 SCORE: 2.37
FIB4 SCORE: 2.13

## 2025-07-23 NOTE — ASSESSMENT & PLAN NOTE
Body mass index is 32.1 kg/m².  Outpatient weight loss management program lifestyle modification highly recommended   PT Evaluation     Today's date: 2022  Patient name: Tila Gutierrez  : 1966  MRN: [id number]  Referring provider: Yung Kirkpatrick*  Dx:   Encounter Diagnosis     ICD-10-CM    1  Tear of medial meniscus of right knee, current, unspecified tear type, subsequent encounter  S83 [id number]    2  S/P arthroscopic partial medial meniscectomy  [id number]        Start Time: 1445  Stop Time: 1545  Total time in clinic (min): 60 minutes    Assessment  Assessment details: Patient is a 49 yo female presenting to physical therapy s/p R partial medial meniscectomy on 22  Patient presents with decreased R knee AROM and PROM, decreased hip and knee strength and decreased activity tolerance  Patient is limited in stairs, amb long distances, transfers and kneeling  PT will address the noted impairments by performing hip and knee strengthening, stretching, balance, functional activities and manual techniques to allow the patient to return to her PLOF  PT recommended 1-2x/week for 4-6 weeks c a good prognosis 2* PLOF  Impairments: abnormal or restricted ROM, activity intolerance, impaired balance, impaired physical strength, lacks appropriate home exercise program, pain with function, poor posture  and poor body mechanics    Symptom irritability: lowUnderstanding of Dx/Px/POC: good   Prognosis: good    Goals  STG: In four weeks the patient will:    1  Be (I) with her HEP  2  Increase hip and knee strength to 4+/5 MMT score to assist c ADLs  3  Increase R knee flexion AROM to 127 degrees to assist c stairs and transfers  LTG: In six weeks, the patient will:    1  Increase FOTO score to 86 to demonstrate improvements in symptoms and function  2  Demonstrate full R knee AROM without pain  3  Negotiate a full flight of stairs without pain  4  Increase hip and knee strength to 5/5 MMT score to assist c prolonged walking  5  Walk a mile without pain  6  Knee on the ground without pain    7  Roll in bed without R knee pain  8  Transfer in the car without R knee pain  Plan  Patient would benefit from: skilled physical therapy and PT eval  Planned modality interventions: cryotherapy and thermotherapy: hydrocollator packs  Planned therapy interventions: abdominal trunk stabilization, joint mobilization, manual therapy, massage, Browne taping, neuromuscular re-education, balance, body mechanics training, breathing training, patient education, postural training, strengthening, stretching, therapeutic activities, therapeutic exercise, transfer training, flexibility, functional ROM exercises, home exercise program and gait training  Frequency: 2x week  Duration in visits: 12  Duration in weeks: 6  Plan of Care beginning date: 2022  Plan of Care expiration date: 2022  Treatment plan discussed with: patient Vincenzo Kimia, SPT was present during the session)        Subjective Evaluation    History of Present Illness  Date of surgery: 2022  Mechanism of injury: surgery  Mechanism of injury: Patient is s/p R partial medial meniscectomy on 22  Patient noted no complications since surgery  Patient noted that yesterday she performed heel slides and noted some pain that was described as a bee sting  Patient noted that descending the steps feel like a pulling sensation in the knee  Patient walked with crutches for 3 days  Patient is currently icing on and off  Patient went back to work today and no increases swelling  Patient noted that rolling in bed is painful  Patient noted no pain will driving and has some pain with car transfers             Recurrent probem    Quality of life: good    Pain  Current pain ratin  At best pain ratin  At worst pain ratin  Location: R medial above joint line and lateral incision   Quality: burning (bee sting)  Relieving factors: rest and ice  Aggravating factors: standing, walking, stair climbing and running  Progression: improved    Social Support  Steps to enter house: yes  12  Lives in: one-story house  Lives with: spouse    Employment status: working  Hand dominance: right      Diagnostic Tests  No diagnostic tests performed  Patient Goals  Patient goals for therapy: increased strength, independence with ADLs/IADLs, return to sport/leisure activities, return to work, increased motion, improved balance and decreased pain  Patient goal: "to walk comfortably " "to work a full day without pain "        Objective     Observations     Right Knee   Positive for incision  Negative for edema  Additional Observation Details  Patient presents with two healing incisions with steri strips  Active Range of Motion   Left Knee   Normal active range of motion    Right Knee   Flexion: 99 degrees with pain  Extension: 4 degrees   Extensor la degrees     Additional Active Range of Motion Details  Patient performed x10 SLR with minimal extensor lag    Passive Range of Motion   Left Knee   Normal passive range of motion    Right Knee   Flexion: 100 degrees   Extension: 4 degrees     Mobility     Additional Mobility Details  Will be assessed at upcoming sessions       Strength/Myotome Testing     Left Hip   Planes of Motion   Flexion: 4+  Abduction: 4+  Adduction: 4+    Right Hip   Planes of Motion   Flexion: 4  Abduction: 4  Adduction: 4    Left Knee   Flexion: 5  Extension: 5    Right Knee   Flexion: 3  Extension: 3  Quadriceps contraction: fair      Flowsheet Rows    Flowsheet Row Most Recent Value   PT/OT G-Codes    Current Score 56   Projected Score 86   Assessment Type Evaluation             Precautions: s/p R partial meniscectomy 22      Manuals /            Patellar mobs (infer/sup & medial/ lat) nv            R knee PROM nv                                      Neuro Re-Ed             Leg press nv            Quad set x10  5" hold            DL squat nv            Hip hikes nv            3 way hip nv            HEP edu MW                         Ther Ex X-ride nv            Heel slides x10            SLR x10            LAQ x10            Hamstring stretch nv            Hip adductor stretch nv            ITB strap stretch nv                         Ther Activity                                       Gait Training                                       Modalities

## 2025-07-23 NOTE — ASSESSMENT & PLAN NOTE
Chronic and stable but she is more anxious as of late as her  is undergoing difficulties with his health and because of that she is the primary caretaker.  She is on Zoloft and trazodone

## 2025-07-23 NOTE — ASSESSMENT & PLAN NOTE
I will optimize blood pressure management keep systolic blood pressure less than 140 diastolic under 90  I am going to continue with metoprolol 25 mg twice daily which will be new for her  I am going to also add hydralazine 10 mg IV every 4 hours as needed for blood pressure spikes  At home she was on atenolol which I am stopping

## 2025-07-23 NOTE — ASSESSMENT & PLAN NOTE
Patient for the past 3 days has been having shortness of breath and palpitations.  She was unaware really of the fact that she was having these until we questioned her about and then she realized is not just her epigastric pain but those symptoms are accompanying the situation as well.  She is also been more fatigued.  Here in the emergency room she has atrial fibrillation with a heart rate in the 140s.  I am consulting cardiology  I am obtaining an urgent echocardiogram  I am starting anticoagulation with Eliquis 5 mg twice daily  The patient may need cardioversion or antiarrhythmic management with amiodarone versus digoxin  I will monitor BMP levels  I have checked thyroid functions which are not significantly abnormal
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
## 2025-07-23 NOTE — ED PROVIDER NOTES
ED Provider Note    CHIEF COMPLAINT  Chief Complaint   Patient presents with    Chest Pain     Pt. C/O intermittent epigastric pain radiating to chest and shoulders x2-3 days ago       EXTERNAL RECORDS REVIEWED  Other review I reviewed past records from the patient's sleep physician the did note the patient has a history of SVT.    HPI/ROS  Jackie Johnson is a 80 y.o. female who presents with epigastric discomfort.  The patient states she stopped her omeprazole about a week ago.  Subsequently she start developing epigastric discomfort and she is eval by her primary care physician who told her she should go back on the medication due to a lot of stress in her life.  Since that time the patient's been having epigastric pain.  She states occasionally does go up into her chest as well as her shoulders.  She has a hard time describing the discomfort.  She is unaware of any exacerbating or relieving factors.  She does not have any pain or swelling to her lower extremities.  She says she does have some dyspnea.  She has not had any diaphoresis.  She denies palpitations.  She does not take any anticoagulants.    PAST MEDICAL HISTORY   has a past medical history of Allergy, Anxiety, Arthritis, Asthma, Back pain, Back pain, Bronchitis, Cardiac arrhythmia, Chest pressure (07/11/2022), Chickenpox, Constipation, Cough, COVID-19 (09/20/2024), Depression, Dizziness, Dysrhythmia, Fatigue, GERD (gastroesophageal reflux disease), Hair loss (10/27/2021), History of nonmelanoma skin cancer (03/21/2018), Hypertension, Mumps, Nasal drainage, Obesity, Painful joint, Ringing in ears, Tonsillitis, and Vaginal Papanicolaou smear not required due to history of hysterectomy (02/28/2013).    SURGICAL HISTORY   has a past surgical history that includes other orthopedic surgery (Bilateral); abdominal hysterectomy total; arthroplasty; eye surgery; tubal coagulation laparoscopic bilateral; remv 2nd cataract,corn-scler sectn; tonsillectomy;  "carpal tunnel release; and arthroscopy, knee.    FAMILY HISTORY  Family History   Problem Relation Age of Onset    Cancer Father         Prostate    Diabetes Father     Heart Disease Father     Prostate cancer Father     Colorectal Cancer Maternal Grandfather     Glaucoma Maternal Grandfather     Colon Cancer Maternal Grandfather     Heart Disease Maternal Grandmother        SOCIAL HISTORY  Social History     Tobacco Use    Smoking status: Former     Current packs/day: 0.00     Average packs/day: 1 pack/day for 56.0 years (56.0 ttl pk-yrs)     Types: Cigarettes     Start date: 10/5/1964     Quit date: 10/10/1985     Years since quittin.8    Smokeless tobacco: Never   Vaping Use    Vaping status: Never Used   Substance and Sexual Activity    Alcohol use: Yes     Alcohol/week: 1.8 oz     Types: 3 Glasses of wine per week    Drug use: No    Sexual activity: Yes     Partners: Male     Birth control/protection: Female Sterilization, Post-Menopausal       CURRENT MEDICATIONS  Home Medications    **Home medications have not yet been reviewed for this encounter**         ALLERGIES  Allergies[1]    PHYSICAL EXAM  VITAL SIGNS: /89   Pulse (!) 136   Resp 15   Ht 1.626 m (5' 4\")   Wt 84.8 kg (187 lb)   SpO2 96%   BMI 32.10 kg/m²    In general the patient appears uncomfortable    HEENT unremarkable    Pulmonary the patient's lungs are clear to auscultation bilaterally    Cardiovascular S1-S2 with a tachycardic rate and irregular rhythm    GI the patient has epigastric discomfort to deep palpation with no rebound or guarding    Skin no rashes, pallor, no jaundice    Extremities no distal edema    Neurologic examination is grossly intact    EKG/LABS  Results for orders placed or performed during the hospital encounter of 25   EKG    Collection Time: 25  9:43 AM   Result Value Ref Range    Report       [facility] Emergency Dept.    Test Date:  2025  Pt Name:    SANDRA " [facility]                Department: [facility]  MRN:        [id number]                      Room:       [facility]  Gender:     Female                       Technician: [id number]  :        [id number]                   Requested By:ER TRIAGE PROTOCOL  Order #:    [id number]                    Reading MD: LORAINE LINO MD    Measurements  Intervals                                Axis  Rate:       142                          P:          0  AR:         0                            QRS:        -13  QRSD:       96                           T:          55  QT:         328  QTc:        504    Interpretive Statements  twelve-lead EKG shows atrial fibrillation with a ventricular rate of 142,  there is some ST segment depression anteriorly, no ST segment elevation.  Electronically Signed On 2025 09:43:06 PDT by LORAINE LINO MD     CBC w/ Differential    Collection Time: 25 10:00 AM   Result Value Ref Range    WBC 8.5 4.8 - 10.8 K/uL    RBC 3.92 (L) 4.20 - 5.40 M/uL    Hemoglobin 12.1 12.0 - 16.0 g/dL    Hematocrit 37.2 37.0 - 47.0 %    MCV 94.9 81.4 - 97.8 fL    MCH 30.9 27.0 - 33.0 pg    MCHC 32.5 32.2 - 35.5 g/dL    RDW 48.2 35.9 - 50.0 fL    Platelet Count 217 164 - 446 K/uL    MPV 10.4 9.0 - 12.9 fL    Neutrophils-Polys 84.20 (H) 44.00 - 72.00 %    Lymphocytes 8.00 (L) 22.00 - 41.00 %    Monocytes 6.30 0.00 - 13.40 %    Eosinophils 0.70 0.00 - 6.90 %    Basophils 0.60 0.00 - 1.80 %    Immature Granulocytes 0.20 0.00 - 0.90 %    Nucleated RBC 0.00 0.00 - 0.20 /100 WBC    Neutrophils (Absolute) 7.11 1.82 - 7.42 K/uL    Lymphs (Absolute) 0.68 (L) 1.00 - 4.80 K/uL    Monos (Absolute) 0.53 0.00 - 0.85 K/uL    Eos (Absolute) 0.06 0.00 - 0.51 K/uL    Baso (Absolute) 0.05 0.00 - 0.12 K/uL    Immature Granulocytes (abs) 0.02 0.00 - 0.11 K/uL    NRBC (Absolute) 0.00 K/uL   Complete Metabolic Panel (CMP)    Collection Time: 25 10:00 AM   Result Value Ref Range    Sodium 139 135 - 145 mmol/L    Potassium 4.1 3.6 -  5.5 mmol/L    Chloride 103 96 - 112 mmol/L    Co2 24 20 - 33 mmol/L    Anion Gap 12.0 7.0 - 16.0    Glucose 109 (H) 65 - 99 mg/dL    Bun 13 8 - 22 mg/dL    Creatinine 0.62 0.50 - 1.40 mg/dL    Calcium 9.0 8.5 - 10.5 mg/dL    Correct Calcium 9.2 8.5 - 10.5 mg/dL    AST(SGOT) 87 (H) 12 - 45 U/L    ALT(SGPT) 174 (H) 2 - 50 U/L    Alkaline Phosphatase 119 (H) 30 - 99 U/L    Total Bilirubin 0.7 0.1 - 1.5 mg/dL    Albumin 3.8 3.2 - 4.9 g/dL    Total Protein 6.5 6.0 - 8.2 g/dL    Globulin 2.7 1.9 - 3.5 g/dL    A-G Ratio 1.4 g/dL   proBrain Natriuretic Peptide, NT    Collection Time: 07/23/25 10:00 AM   Result Value Ref Range    NT-proBNP 1521 (H) 0 - 125 pg/mL   Troponin - STAT Once    Collection Time: 07/23/25 10:00 AM   Result Value Ref Range    Troponin T 10 6 - 19 ng/L   Lipase    Collection Time: 07/23/25 10:00 AM   Result Value Ref Range    Lipase 19 11 - 82 U/L   APTT    Collection Time: 07/23/25 10:00 AM   Result Value Ref Range    APTT 28.6 24.7 - 36.0 sec   PT/INR    Collection Time: 07/23/25 10:00 AM   Result Value Ref Range    PT 13.2 12.0 - 14.6 sec    INR 0.97 0.87 - 1.13   TSH (for screening thyroid dysfunction)    Collection Time: 07/23/25 10:00 AM   Result Value Ref Range    TSH 2.020 0.380 - 5.330 uIU/mL   ESTIMATED GFR    Collection Time: 07/23/25 10:00 AM   Result Value Ref Range    GFR (CKD-EPI) 91 >60 mL/min/1.73 m 2       I have independently interpreted this EKG    RADIOLOGY/PROCEDURES     Radiologist interpretation:  DX-CHEST-PORTABLE (1 VIEW)   Final Result      Cardiomegaly.          COURSE & MEDICAL DECISION MAKING    This a 80-year-old female who presents emergency department with epigastric discomfort and chest discomfort.  As for the epigastric pain like this is from gastritis.  The patient does admit to some alcohol use and I suspect this could be the source.  She does have a slight transaminitis and this could be from alcohol-induced hepatitis.  She does not have an elevation in her  "bilirubin nor her white blood cell count to support cholecystitis.  I will order an ultrasound of the right upper quadrant to further delineate the source of the transaminitis.  The patient does present in atrial fibrillation with a rapid ventricular rate.  We initially gave 20 mg of Cardizem and this was not effective.  Subsequently patient received 5 mg of Lopressor which was transient effective and she received a second dose.  The patient will receive Lovenox for anticoagulation.  It is unclear when she went into the atrial fibrillation therefore she will require an echocardiogram prior to cardioversion.  The patient will be admitted to the hospitalist in guarded condition.    FINAL DIAGNOSIS  1.  Atrial fibrillation with a rapid ventricular rate  2.  Epigastric pain  3.  Transaminitis  4.  Critical care time 35 minutes    Disposition  The patient will be admitted in guarded condition     Electronically signed by: Brian Palacios M.D., 7/23/2025 9:38 AM           [1]   Allergies  Allergen Reactions    Morphine      Lowers heart rate    Erythromycin Nausea    Hydrocodone-Acetaminophen     Mastisol Adhesive [Mastisol] Rash    Meperidine Hcl Unspecified     [08/08/09 UNKNOWN, PLEASE VERIFY]    Nickel Hives    Other Drug      \"All narcotics make me nauseated\"     "

## 2025-07-23 NOTE — PROGRESS NOTES
4 Eyes Skin Assessment Completed by MAGNOLIA Reynolds and MAGNOLIA Bailey.    Skin assessment is primarily focused on high risk bony prominences. Pay special attention to skin beneath and around medical devices, high risk bony prominences, skin to skin areas and areas where the patient lacks sensation to feel pain and areas where the patient previously had breakdown.     Head (Occipital):  WDL   Ears (Under Medical Devices): WDL   Nose (Under Medical Devices): WDL   Mouth:  WDL   Neck: WDL   Breast/Chest:  WDL   Shoulder Blades:  WDL   Spine:   WDL   (R) Arm/Elbow/Hand: WDL   (L) Arm/Elbow/Hand: Scab   Abdomen: WDL   Pannus/Groin:  WDL   Sacrum/Coccyx:   WDL   (R) Ischial Tuberosity (Sit Bones):  WDL   (L) Ischial Tuberosity (Sit Bones):  WDL   (R) Leg:  Scar   (L) Leg:  Scar   (R) Heel:  WDL   (R) Foot/Toe: WDL   (L) Heel: WDL   (L) Foot/Toe:  WDL       DEVICES IN USE:   Respiratory Devices:  NA, patient on room air  Feeding Devices:  N/A   Lines & BP Monitoring Devices:  N/A    Orthopedic Devices:  N/A  Miscellaneous Devices:  N/A    PROTOCOL INTERVENTIONS:   Standard/Trauma Bed:  Already in place    WOUND PHOTOS:   N/A no wounds identified    WOUND CONSULT:   N/A, no advanced wound care needs identified

## 2025-07-23 NOTE — H&P
Hospital Medicine History & Physical Note    Date of Service  7/23/2025    Primary Care Physician  Saeid Alvarado M.D.    Consultants  cardiology    Specialist Names: Dr. Agosto/JOVANI Valenzuela    Code Status  Full Code    Chief Complaint  Chief Complaint   Patient presents with    Chest Pain     Pt. C/O intermittent epigastric pain radiating to chest and shoulders x2-3 days ago       History of Presenting Illness  Jackie Johnson is a 80 y.o. female who presented 7/23/2025 with severe epigastric pain.  The patient apparently for the past several months has been under a lot of family stress due to her 's medical problems.  The patient at this point has been using about 1 bottle of wine per week which is new for her.  The patient has developed severe epigastric pain due to the fact that she stopped her omeprazole and has been having more stress.  Last Thursday she was advised by her primary care physician to go back on the omeprazole which she did.  She is taking 40 mg nightly.  In spite of that the pain became so severe she came to the emergency room today.  She says she has been more tired and fatigued and she is found to be in atrial fibrillation with rapid ventricular response.  She was initially given IV Cardizem which was ineffective.  She was then given IV Lopressor which was effective for a short while but then her heart rate went back up into the 140s.  The patient when questioned further recognizes that for the past 3 days she has actually been having palpitations as well as shortness of breath and generalized fatigue.  Since the atrial fibrillation at this point is not well-controlled at all I have asked cardiology to consult on the patient and they may do a cardioversion versus medication optimization.  For now I have started the patient on metoprolol 25 mg twice daily and I am asking for a urgent echocardiogram as well.    I discussed the plan of care with the patient, the bedside nurse,  pharmacy, charge nurse, emergency room physician Dr. Winston Palacios.    Review of Systems  Review of Systems   Constitutional:  Positive for malaise/fatigue. Negative for chills, diaphoresis and fever.   HENT: Negative.     Eyes: Negative.  Negative for double vision.   Respiratory:  Positive for shortness of breath. Negative for cough, hemoptysis and wheezing.    Cardiovascular:  Positive for palpitations. Negative for chest pain and leg swelling.   Gastrointestinal:  Positive for abdominal pain, heartburn and nausea. Negative for blood in stool, constipation, diarrhea and vomiting.   Genitourinary: Negative.  Negative for frequency, hematuria and urgency.   Musculoskeletal: Negative.  Negative for joint pain.   Skin: Negative.  Negative for itching and rash.   Neurological: Negative.  Negative for dizziness, focal weakness, seizures, loss of consciousness and headaches.   Endo/Heme/Allergies: Negative.  Does not bruise/bleed easily.   Psychiatric/Behavioral: Negative.  Negative for suicidal ideas. The patient is not nervous/anxious.    All other systems reviewed and are negative.      Past Medical History   has a past medical history of Allergy, Anxiety, Arthritis, Asthma, Back pain, Back pain, Bronchitis, Cardiac arrhythmia, Chest pressure (07/11/2022), Chickenpox, Constipation, Cough, COVID-19 (09/20/2024), Depression, Dizziness, Dysrhythmia, Fatigue, GERD (gastroesophageal reflux disease), Hair loss (10/27/2021), History of nonmelanoma skin cancer (03/21/2018), Hypertension, Mumps, Nasal drainage, Obesity, Painful joint, Ringing in ears, Tonsillitis, and Vaginal Papanicolaou smear not required due to history of hysterectomy (02/28/2013).    Surgical History   has a past surgical history that includes other orthopedic surgery (Bilateral); abdominal hysterectomy total; arthroplasty; eye surgery; tubal coagulation laparoscopic bilateral; pr remv 2nd cataract,corn-scler sectn; tonsillectomy; carpal tunnel release;  and arthroscopy, knee.     Family History  Family History   Problem Relation Age of Onset    Cancer Father         Prostate    Diabetes Father     Heart Disease Father     Prostate cancer Father     Colorectal Cancer Maternal Grandfather     Glaucoma Maternal Grandfather     Colon Cancer Maternal Grandfather     Heart Disease Maternal Grandmother         Family history reviewed with patient. There is family history that is pertinent to the chief complaint.     Social History   reports that she quit smoking about 39 years ago. Her smoking use included cigarettes. She started smoking about 60 years ago. She has a 56 pack-year smoking history. She has never used smokeless tobacco. She reports current alcohol use of about 1.8 oz of alcohol per week. She reports that she does not use drugs.    Allergies  Allergies[1]    Medications  Prior to Admission Medications   Prescriptions Last Dose Informant Patient Reported? Taking?   Melatonin 5 MG Chew Tab 7/22/2025 Bedtime Patient Yes Yes   Sig: Chew 10 mg at bedtime as needed.   albuterol 108 (90 Base) MCG/ACT Aero Soln inhalation aerosol 7/19/2025 Patient No No   Sig: Inhale 1 to 2 puffs by mouth every 4 to 6 hours as needed for wheezing and cough   Patient taking differently: Inhale 1 Puff 1 time a day as needed for Shortness of Breath. Inhale 1 to 2 puffs by mouth every 4 to 6 hours as needed for wheezing and cough   atenolol (TENORMIN) 50 MG Tab 7/22/2025 Morning Patient No Yes   Sig: Take 1 Tablet by mouth every day.   beclomethasone HFA (QVAR REDIHALER) 40 MCG/ACT inhaler 7/19/2025 Patient No No   Sig: Inhale 1 Puff 2 times a day.   ezetimibe (ZETIA) 10 MG Tab Unknown Patient Yes No   Sig: Take 10 mg by mouth every day. Stopped 3 weeks ago   omeprazole (PRILOSEC) 20 MG delayed-release capsule 7/22/2025 Evening Patient No Yes   Sig: Take 2 Capsules by mouth every day.   sertraline (ZOLOFT) 100 MG Tab 7/22/2025 Morning Patient No Yes   Sig: Take 1 tablet by mouth every  day   simvastatin (ZOCOR) 40 MG Tab Unknown Patient No No   Sig: Take 1 Tablet by mouth every evening.   Patient taking differently: Take 40 mg by mouth every evening. Stopped 2-3 weeks ago   traZODone (DESYREL) 50 MG Tab 7/22/2025 Bedtime Patient No Yes   Sig: Take 1/2 tab (cut the tablet in half) by mouth before bed take the 2nd half if not able to sleep 1 hour after first dose.   Patient taking differently: Take 50 mg by mouth every evening.     triamcinolone acetonide (KENALOG) 0.1 % Cream 6/23/2025 Patient No No   Sig: Apply to the affected area twice daily.      Facility-Administered Medications: None       Physical Exam  Pulse:  [136] 136  Resp:  [15] 15  BP: (137)/(89) 137/89  SpO2:  [96 %] 96 %  Blood Pressure : 137/89       Pulse: (!) 136   Respiration: 15   Pulse Oximetry: 96 %       Physical Exam  Vitals and nursing note reviewed. Exam conducted with a chaperone present.   Constitutional:       General: She is awake.      Appearance: Normal appearance. She is well-developed and well-groomed. She is obese. She is ill-appearing.   HENT:      Head: Normocephalic and atraumatic.      Jaw: There is normal jaw occlusion. No trismus.      Salivary Glands: Right salivary gland is not tender. Left salivary gland is not tender.      Right Ear: External ear normal.      Left Ear: External ear normal.      Nose: Nose normal.      Mouth/Throat:      Mouth: Mucous membranes are dry.      Pharynx: Oropharynx is clear.   Eyes:      General: Lids are normal. Vision grossly intact.      Extraocular Movements: Extraocular movements intact.      Conjunctiva/sclera: Conjunctivae normal.      Right eye: Right conjunctiva is not injected. No exudate.     Left eye: Left conjunctiva is not injected. No exudate.     Pupils: Pupils are equal, round, and reactive to light.   Neck:      Thyroid: No thyroid mass.      Vascular: No hepatojugular reflux or JVD.      Trachea: No abnormal tracheal secretions or tracheal deviation.    Cardiovascular:      Rate and Rhythm: Tachycardia present. Rhythm irregular. Occasional Extrasystoles are present.     Pulses: Normal pulses.      Heart sounds: Normal heart sounds. No murmur heard.     No friction rub.   Pulmonary:      Effort: Pulmonary effort is normal. Tachypnea present.      Breath sounds: Decreased air movement present. Examination of the right-upper field reveals decreased breath sounds. Examination of the left-upper field reveals decreased breath sounds. Examination of the right-middle field reveals decreased breath sounds. Examination of the left-middle field reveals decreased breath sounds. Examination of the right-lower field reveals decreased breath sounds. Examination of the left-lower field reveals decreased breath sounds. Decreased breath sounds present. No wheezing or rhonchi.   Abdominal:      General: Abdomen is flat. Bowel sounds are normal.      Palpations: Abdomen is soft.      Tenderness: There is abdominal tenderness in the epigastric area. There is no right CVA tenderness or left CVA tenderness.      Hernia: No hernia is present.   Musculoskeletal:      Cervical back: Full passive range of motion without pain, normal range of motion and neck supple. No rigidity. No muscular tenderness.      Right lower leg: No edema.      Left lower leg: No edema.   Lymphadenopathy:      Head:      Right side of head: No submental adenopathy.      Left side of head: No submental adenopathy.      Cervical:      Right cervical: No superficial cervical adenopathy.     Left cervical: No superficial cervical adenopathy.      Upper Body:      Right upper body: No supraclavicular adenopathy.      Left upper body: No supraclavicular adenopathy.   Skin:     General: Skin is warm and dry.      Capillary Refill: Capillary refill takes 2 to 3 seconds.      Coloration: Skin is not cyanotic or pale.      Findings: No abrasion or bruising.   Neurological:      General: No focal deficit present.       Mental Status: She is alert and oriented to person, place, and time. Mental status is at baseline.      GCS: GCS eye subscore is 4. GCS verbal subscore is 5. GCS motor subscore is 6.      Cranial Nerves: No cranial nerve deficit.      Sensory: No sensory deficit.      Motor: Motor function is intact.      Deep Tendon Reflexes:      Reflex Scores:       Tricep reflexes are 2+ on the right side and 2+ on the left side.       Bicep reflexes are 2+ on the right side and 2+ on the left side.       Brachioradialis reflexes are 2+ on the right side and 2+ on the left side.       Patellar reflexes are 2+ on the right side and 2+ on the left side.       Achilles reflexes are 2+ on the right side and 2+ on the left side.  Psychiatric:         Attention and Perception: Attention and perception normal.         Mood and Affect: Mood is anxious.         Speech: Speech normal.         Behavior: Behavior normal. Behavior is cooperative.         Thought Content: Thought content normal.         Cognition and Memory: Cognition and memory normal.         Judgment: Judgment normal.         Laboratory:  Recent Labs     07/23/25  1000   WBC 8.5   RBC 3.92*   HEMOGLOBIN 12.1   HEMATOCRIT 37.2   MCV 94.9   MCH 30.9   MCHC 32.5   RDW 48.2   PLATELETCT 217   MPV 10.4     Recent Labs     07/23/25  1000   SODIUM 139   POTASSIUM 4.1   CHLORIDE 103   CO2 24   GLUCOSE 109*   BUN 13   CREATININE 0.62   CALCIUM 9.0     Recent Labs     07/23/25  1000   ALTSGPT 174*   ASTSGOT 87*   ALKPHOSPHAT 119*   TBILIRUBIN 0.7   LIPASE 19   GLUCOSE 109*     Recent Labs     07/23/25  1000   APTT 28.6   INR 0.97     Recent Labs     07/23/25  1000   NTPROBNP 1521*         Recent Labs     07/23/25  1000   TROPONINT 10       Imaging:  DX-CHEST-PORTABLE (1 VIEW)   Final Result      Cardiomegaly.      US-RUQ    (Results Pending)   EC-ECHOCARDIOGRAM COMPLETE W/O CONT    (Results Pending)       X-Ray:  I have personally reviewed the images and compared with prior  images.  EKG:  I have personally reviewed the images and compared with prior images.    Assessment/Plan:  Justification for Admission Status  I anticipate this patient will require at least two midnights for appropriate medical management, necessitating inpatient admission because patient has new onset of atrial fibrillation will require his rate control and most likely 48 hours or longer of inpatient management    Patient will need a Telemetry bed on MEDICAL service .  The need is secondary to patient.    * Atrial fibrillation with rapid ventricular response (HCC)- (present on admission)  Assessment & Plan  Patient for the past 3 days has been having shortness of breath and palpitations.  She was unaware really of the fact that she was having these until we questioned her about and then she realized is not just her epigastric pain but those symptoms are accompanying the situation as well.  She is also been more fatigued.  Here in the emergency room she has atrial fibrillation with a heart rate in the 140s.  I am consulting cardiology  I am obtaining an urgent echocardiogram  I am starting anticoagulation with Eliquis 5 mg twice daily  The patient may need cardioversion or antiarrhythmic management with amiodarone versus digoxin  I will monitor BMP levels  I have checked thyroid functions which are not significantly abnormal    Elevated liver enzymes- (present on admission)  Assessment & Plan  Liver enzymes are significantly elevated unchecking hepatitis panel  Emergency room ordered a ultrasound of the right upper quadrant which is pending  I am stopping statin and Zetia      Essential hypertension- (present on admission)  Assessment & Plan  I will optimize blood pressure management keep systolic blood pressure less than 140 diastolic under 90  I am going to continue with metoprolol 25 mg twice daily which will be new for her  I am going to also add hydralazine 10 mg IV every 4 hours as needed for blood pressure  spikes  At home she was on atenolol which I am stopping    Primary insomnia- (present on admission)  Assessment & Plan  Continue with trazodone and melatonin    LIZETH (obstructive sleep apnea)- (present on admission)  Assessment & Plan  Continue with nocturnal oxygen support    BMI 32.0-32.9,adult- (present on admission)  Assessment & Plan  Body mass index is 32.1 kg/m².  Outpatient weight loss management program lifestyle modification highly recommended    Irritable bowel syndrome- (present on admission)  Assessment & Plan  Chronic irritable bowel syndrome for which she has diarrhea but right now she is actually constipated will need a dose of lactulose for the constipation.    Asthma- (present on admission)  Assessment & Plan  Chronic asthma currently not in exacerbation  Continue with RT protocol nebulizer treatments    Hyperlipidemia- (present on admission)  Assessment & Plan  Low-fat low-cholesterol diet  Currently hold statin and Zetia with her elevated liver functions    Bilateral primary osteoarthritis of knee- (present on admission)  Assessment & Plan  Chronic pain management as needed    GERD (gastroesophageal reflux disease)- (present on admission)  Assessment & Plan  PPI therapy with omeprazole 40 mg daily.  Patient stopped taking her omeprazole for a while and she experienced severe epigastric abdominal pain.  She then started taking her medication last Thursday    Major depressive disorder, recurrent episode (HCC)- (present on admission)  Assessment & Plan  Chronic and stable but she is more anxious as of late as her  is undergoing difficulties with his health and because of that she is the primary caretaker.  She is on Zoloft and trazodone        VTE prophylaxis: SCDs/TEDs and therapeutic anticoagulation with Eliquis       [1]   Allergies  Allergen Reactions    Morphine      Lowers heart rate    Erythromycin Nausea    Hydrocodone-Acetaminophen     Mastisol Adhesive [Mastisol] Rash    Meperidine  "Hcl Unspecified     [08/08/09 UNKNOWN, PLEASE VERIFY]    Nickel Hives    Other Drug      \"All narcotics make me nauseated\"     "

## 2025-07-23 NOTE — ED NOTES
"Med rec is complete per Patient at bedside.   Per Pt, she stopped Simvastatin and Ezetimibe 2-3 weeks ago because of \"stomach issues\".  Pt stopped Omeprazole too, but \"restarted after her doctor told her she needed to be on it\".  Allergies reviewed.    Has patient had any outside antibiotics in the last 30 days? N    Antibiotics: nitrofurantoin, doxycycline, sulfamethoxazole-trimethoprim?N    Antiparasitics include: albendazole, ivermectin, pyrantel pamoate (OTC), mebendazole and metronidazole?N    Antivirals: acyclovir, valacyclovir?N    Antifungals: voriconazole, posaconazole, and isavuconazonium (Cresemba®)?N       Any Anticoagulants (rivaroxaban, apixaban, edoxaban, dabigatran, warfarin, enoxaparin) taken in the last 14 days? N         Pharmacy/Pharmacies Pt utilizes : [facility] [phone]         "

## 2025-07-23 NOTE — ASSESSMENT & PLAN NOTE
Chronic irritable bowel syndrome for which she has diarrhea but right now she is actually constipated will need a dose of lactulose for the constipation.

## 2025-07-23 NOTE — ASSESSMENT & PLAN NOTE
Liver enzymes are significantly elevated unchecking hepatitis panel  Emergency room ordered a ultrasound of the right upper quadrant which is pending  I am stopping statin and Zetia

## 2025-07-23 NOTE — ASSESSMENT & PLAN NOTE
PPI therapy with omeprazole 40 mg daily.  Patient stopped taking her omeprazole for a while and she experienced severe epigastric abdominal pain.  She then started taking her medication last Thursday

## 2025-07-23 NOTE — DISCHARGE PLANNING
TCN following. HTH/SCP chart review completed.  Per chart review patient currently in ED secondary to chest pain.      Note pt has a [facility] PCP but does not have a PCP f/u appointment.  Will refer to  if indicated by ERP.  Per review, she is ambulatory at baseline and does not have need for O2 at this time. Based on current review, it is anticipated that pt will dc to home with close OP f/u (either directly from ED or after admission to [facility] if warranted).     If patient does not warrant admission/ inpatient status to [facility] and is unable to functionally discharge home, please reach out to TCN for assist with SCP auth to discharge directly from ED to skilled.     If patient admits to [facility], please reach out to TCN via [facility] if post acute transitional care needs are warranted for dc planning. Thank you.      Addendum:  1116- Per chart review, patient is being admitted with A-fib with RVR, epigastric pain and transaminitis.

## 2025-07-24 ENCOUNTER — ANESTHESIA (OUTPATIENT)
Dept: SURGERY | Facility: MEDICAL CENTER | Age: 80
End: 2025-07-24
Payer: MEDICARE

## 2025-07-24 ENCOUNTER — PHARMACY VISIT (OUTPATIENT)
Dept: PHARMACY | Facility: MEDICAL CENTER | Age: 80
End: 2025-07-24
Payer: COMMERCIAL

## 2025-07-24 ENCOUNTER — ANESTHESIA EVENT (OUTPATIENT)
Dept: SURGERY | Facility: MEDICAL CENTER | Age: 80
End: 2025-07-24
Payer: MEDICARE

## 2025-07-24 ENCOUNTER — APPOINTMENT (OUTPATIENT)
Dept: CARDIOLOGY | Facility: MEDICAL CENTER | Age: 80
End: 2025-07-24
Attending: NURSE PRACTITIONER
Payer: MEDICARE

## 2025-07-24 VITALS
BODY MASS INDEX: 34.82 KG/M2 | DIASTOLIC BLOOD PRESSURE: 70 MMHG | RESPIRATION RATE: 18 BRPM | HEART RATE: 64 BPM | HEIGHT: 64 IN | WEIGHT: 203.93 LBS | TEMPERATURE: 98.1 F | SYSTOLIC BLOOD PRESSURE: 150 MMHG | OXYGEN SATURATION: 92 %

## 2025-07-24 LAB
ANION GAP SERPL CALC-SCNC: 12 MMOL/L (ref 7–16)
BUN SERPL-MCNC: 13 MG/DL (ref 8–22)
CALCIUM SERPL-MCNC: 8.4 MG/DL (ref 8.5–10.5)
CHLORIDE SERPL-SCNC: 104 MMOL/L (ref 96–112)
CO2 SERPL-SCNC: 22 MMOL/L (ref 20–33)
CREAT SERPL-MCNC: 0.59 MG/DL (ref 0.5–1.4)
EKG IMPRESSION: NORMAL
ERYTHROCYTE [DISTWIDTH] IN BLOOD BY AUTOMATED COUNT: 49.6 FL (ref 35.9–50)
GFR SERPLBLD CREATININE-BSD FMLA CKD-EPI: 92 ML/MIN/1.73 M 2
GLUCOSE SERPL-MCNC: 106 MG/DL (ref 65–99)
HCT VFR BLD AUTO: 36.7 % (ref 37–47)
HGB BLD-MCNC: 11.8 G/DL (ref 12–16)
LV EJECT FRACT  99904: 40
MCH RBC QN AUTO: 31 PG (ref 27–33)
MCHC RBC AUTO-ENTMCNC: 32.2 G/DL (ref 32.2–35.5)
MCV RBC AUTO: 96.3 FL (ref 81.4–97.8)
PLATELET # BLD AUTO: 237 K/UL (ref 164–446)
PMV BLD AUTO: 9.8 FL (ref 9–12.9)
POTASSIUM SERPL-SCNC: 4.3 MMOL/L (ref 3.6–5.5)
RBC # BLD AUTO: 3.81 M/UL (ref 4.2–5.4)
SODIUM SERPL-SCNC: 138 MMOL/L (ref 135–145)
TROPONIN T SERPL-MCNC: 19 NG/L (ref 6–19)
WBC # BLD AUTO: 8.2 K/UL (ref 4.8–10.8)

## 2025-07-24 PROCEDURE — 700101 HCHG RX REV CODE 250: Performed by: ANESTHESIOLOGY

## 2025-07-24 PROCEDURE — 93325 DOPPLER ECHO COLOR FLOW MAPG: CPT | Mod: 26 | Performed by: INTERNAL MEDICINE

## 2025-07-24 PROCEDURE — 84484 ASSAY OF TROPONIN QUANT: CPT

## 2025-07-24 PROCEDURE — 36415 COLL VENOUS BLD VENIPUNCTURE: CPT

## 2025-07-24 PROCEDURE — 93010 ELECTROCARDIOGRAM REPORT: CPT | Performed by: INTERNAL MEDICINE

## 2025-07-24 PROCEDURE — 110305 HCHG STAT TEE: Performed by: INTERNAL MEDICINE

## 2025-07-24 PROCEDURE — 99239 HOSP IP/OBS DSCHRG MGMT >30: CPT | Performed by: INTERNAL MEDICINE

## 2025-07-24 PROCEDURE — 160197 HCHG MAC ANESTHESIA: Performed by: INTERNAL MEDICINE

## 2025-07-24 PROCEDURE — 160048 HCHG OR STATISTICAL LEVEL 1-5: Performed by: INTERNAL MEDICINE

## 2025-07-24 PROCEDURE — 99232 SBSQ HOSP IP/OBS MODERATE 35: CPT | Mod: 25,FS | Performed by: INTERNAL MEDICINE

## 2025-07-24 PROCEDURE — 160002 HCHG RECOVERY MINUTES (STAT): Performed by: INTERNAL MEDICINE

## 2025-07-24 PROCEDURE — 93312 ECHO TRANSESOPHAGEAL: CPT | Mod: 26 | Performed by: INTERNAL MEDICINE

## 2025-07-24 PROCEDURE — 700105 HCHG RX REV CODE 258: Performed by: INTERNAL MEDICINE

## 2025-07-24 PROCEDURE — 700102 HCHG RX REV CODE 250 W/ 637 OVERRIDE(OP): Performed by: HOSPITALIST

## 2025-07-24 PROCEDURE — 700102 HCHG RX REV CODE 250 W/ 637 OVERRIDE(OP): Performed by: NURSE PRACTITIONER

## 2025-07-24 PROCEDURE — A9270 NON-COVERED ITEM OR SERVICE: HCPCS | Performed by: HOSPITALIST

## 2025-07-24 PROCEDURE — 80048 BASIC METABOLIC PNL TOTAL CA: CPT

## 2025-07-24 PROCEDURE — 700111 HCHG RX REV CODE 636 W/ 250 OVERRIDE (IP): Performed by: ANESTHESIOLOGY

## 2025-07-24 PROCEDURE — RXMED WILLOW AMBULATORY MEDICATION CHARGE: Performed by: INTERNAL MEDICINE

## 2025-07-24 PROCEDURE — 92960 CARDIOVERSION ELECTRIC EXT: CPT | Performed by: INTERNAL MEDICINE

## 2025-07-24 PROCEDURE — G0378 HOSPITAL OBSERVATION PER HR: HCPCS

## 2025-07-24 PROCEDURE — 93005 ELECTROCARDIOGRAM TRACING: CPT | Mod: TC,XE | Performed by: HOSPITALIST

## 2025-07-24 PROCEDURE — 93325 DOPPLER ECHO COLOR FLOW MAPG: CPT

## 2025-07-24 PROCEDURE — 160015 HCHG STAT PREOP MINUTES: Performed by: INTERNAL MEDICINE

## 2025-07-24 PROCEDURE — A9270 NON-COVERED ITEM OR SERVICE: HCPCS | Performed by: NURSE PRACTITIONER

## 2025-07-24 PROCEDURE — 160193 HCHG PACU STANDARD - 1ST 60 MINS: Performed by: INTERNAL MEDICINE

## 2025-07-24 PROCEDURE — 85027 COMPLETE CBC AUTOMATED: CPT

## 2025-07-24 PROCEDURE — 99232 SBSQ HOSP IP/OBS MODERATE 35: CPT | Mod: FS | Performed by: NURSE PRACTITIONER

## 2025-07-24 RX ORDER — ONDANSETRON 2 MG/ML
4 INJECTION INTRAMUSCULAR; INTRAVENOUS
Status: DISCONTINUED | OUTPATIENT
Start: 2025-07-24 | End: 2025-07-24 | Stop reason: HOSPADM

## 2025-07-24 RX ORDER — METOPROLOL TARTRATE 50 MG
50 TABLET ORAL 2 TIMES DAILY
Status: DISCONTINUED | OUTPATIENT
Start: 2025-07-24 | End: 2025-07-24 | Stop reason: HOSPADM

## 2025-07-24 RX ORDER — SODIUM CHLORIDE, SODIUM LACTATE, POTASSIUM CHLORIDE, CALCIUM CHLORIDE 600; 310; 30; 20 MG/100ML; MG/100ML; MG/100ML; MG/100ML
INJECTION, SOLUTION INTRAVENOUS CONTINUOUS
Status: ACTIVE | OUTPATIENT
Start: 2025-07-24 | End: 2025-07-24

## 2025-07-24 RX ORDER — LIDOCAINE HYDROCHLORIDE 20 MG/ML
INJECTION, SOLUTION EPIDURAL; INFILTRATION; INTRACAUDAL; PERINEURAL PRN
Status: DISCONTINUED | OUTPATIENT
Start: 2025-07-24 | End: 2025-07-24 | Stop reason: SURG

## 2025-07-24 RX ORDER — METOPROLOL TARTRATE 50 MG
50 TABLET ORAL 2 TIMES DAILY
Qty: 60 TABLET | Refills: 2 | Status: SHIPPED | OUTPATIENT
Start: 2025-07-24

## 2025-07-24 RX ORDER — SODIUM CHLORIDE, SODIUM LACTATE, POTASSIUM CHLORIDE, CALCIUM CHLORIDE 600; 310; 30; 20 MG/100ML; MG/100ML; MG/100ML; MG/100ML
INJECTION, SOLUTION INTRAVENOUS CONTINUOUS
Status: DISCONTINUED | OUTPATIENT
Start: 2025-07-24 | End: 2025-07-24 | Stop reason: HOSPADM

## 2025-07-24 RX ADMIN — METOPROLOL TARTRATE 50 MG: 50 TABLET, FILM COATED ORAL at 17:15

## 2025-07-24 RX ADMIN — METOPROLOL TARTRATE 25 MG: 25 TABLET, FILM COATED ORAL at 00:40

## 2025-07-24 RX ADMIN — APIXABAN 5 MG: 5 TABLET, FILM COATED ORAL at 05:23

## 2025-07-24 RX ADMIN — PROPOFOL 20 MG: 10 INJECTION, EMULSION INTRAVENOUS at 12:01

## 2025-07-24 RX ADMIN — LIDOCAINE HYDROCHLORIDE 60 MG: 20 INJECTION, SOLUTION EPIDURAL; INFILTRATION; INTRACAUDAL; PERINEURAL at 11:57

## 2025-07-24 RX ADMIN — FLUTICASONE PROPIONATE 88 MCG: 44 AEROSOL, METERED RESPIRATORY (INHALATION) at 05:23

## 2025-07-24 RX ADMIN — PROPOFOL 50 MG: 10 INJECTION, EMULSION INTRAVENOUS at 11:57

## 2025-07-24 RX ADMIN — METOPROLOL TARTRATE 25 MG: 25 TABLET, FILM COATED ORAL at 05:23

## 2025-07-24 RX ADMIN — SERTRALINE 100 MG: 50 TABLET, FILM COATED ORAL at 05:23

## 2025-07-24 RX ADMIN — LACTULOSE 30 ML: 10 SOLUTION ORAL at 05:22

## 2025-07-24 RX ADMIN — SODIUM CHLORIDE, POTASSIUM CHLORIDE, SODIUM LACTATE AND CALCIUM CHLORIDE: 600; 310; 30; 20 INJECTION, SOLUTION INTRAVENOUS at 11:48

## 2025-07-24 RX ADMIN — APIXABAN 5 MG: 5 TABLET, FILM COATED ORAL at 17:15

## 2025-07-24 ASSESSMENT — PAIN SCALES - GENERAL: PAIN_LEVEL: 0

## 2025-07-24 ASSESSMENT — PAIN DESCRIPTION - PAIN TYPE
TYPE: SURGICAL PAIN
TYPE: ACUTE PAIN
TYPE: ACUTE PAIN

## 2025-07-24 NOTE — PROGRESS NOTES
Telemetry shift summary    Rhythm: Afib  HR:   Ectopy: R-PVC    Measurements: - / .08 / .48    Normal values  Rhythm SR  HR   Measurements: 0.12-0.20/0.08-0.10/0.30-0.52

## 2025-07-24 NOTE — PROGRESS NOTES
Telemetry Shift Summary    Rhythm Afib  HR Range 120-130s touched 140s  Ectopy R-PVC  Measurements -/0.08/-        Normal Values  Rhythm SR  HR Range    Measurements 0.12-0.20 / 0.06-0.10  / 0.30-0.52

## 2025-07-24 NOTE — ANESTHESIA TIME REPORT
Anesthesia Start and Stop Event Times       Date Time Event    7/24/2025 1131 Ready for Procedure     1148 Anesthesia Start     1211 Anesthesia Stop          Responsible Staff  07/24/25      Name Role Begin End    Danette Ferreira M.D. Anesth 1148 1211          Overtime Reason:  no overtime (within assigned shift)    Comments:

## 2025-07-24 NOTE — PROGRESS NOTES
Patient underwent successful ANG/CV to restore normal sinus rhythm. We will initiate lopressor 50mg BID and Eliquis 5mg BID.    She is to take her BP and HR for the next 2 weeks and send me a [employer] message with the results. Echo 8/18 and follow up appt with me 8/21.     Cardiology will sign off.

## 2025-07-24 NOTE — CARE PLAN
Problem: Knowledge Deficit - Standard  Goal: Patient and family/care givers will demonstrate understanding of plan of care, disease process/condition, diagnostic tests and medications  Outcome: Progressing     Problem: Pain - Standard  Goal: Alleviation of pain or a reduction in pain to the patient’s comfort goal  Outcome: Progressing   The patient is Stable - Low risk of patient condition declining or worsening    Shift Goals  Clinical Goals: Monitor labs and vitals  Patient Goals: rest and comfort  Family Goals: ALEXA    Progress made toward(s) clinical / shift goals:  Pt verbalized understnding of POC & barriers to DC. Pt asks appropriate question regarding POC.   Patient verbalizes pain using 0-10 scale. Patient uses pharmacological and non-pharmacological methods of pain management.              N/A

## 2025-07-24 NOTE — OR NURSING
1207- Pt arrived to PACU, report received. Pt on 4L mask.      1226- Pts son called and updated.      1234- s/p cardioversion EKG done at bedside.     1242- Recovery complete. Report called to RN. Placed on transport.     1306- Pt out of PACU and back to room via transport.

## 2025-07-24 NOTE — PROGRESS NOTES
Bedside report received from dayshift RNRodolfo. Patient awake and alert in bed AOx4. Pt had complaints of pain in her shoulder and neck radiating from her back and was administered medication per the MAR. Pt is on room air. Bed is locked and in low position with bed alarm on. Reviewed plan of care with patient. Call light within reach. No other needs at this time.
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
## 2025-07-24 NOTE — ANESTHESIA PREPROCEDURE EVALUATION
Case: [id number] Date/Time: 07/24/25 1145    Procedures:       ECHOCARDIOGRAM, TRANSESOPHAGEAL      CARDIOVERSION    Location:  PROCEDURE ROOM / SURGERY [facility]    Surgeons: Tree Marquez M.D.          79 yo w/afib w/poor rate control despite digoxin and metoprolol.  BNP elevated and currently on supplemental oxygen    Relevant Problems   ANESTHESIA   (positive) LIZETH (obstructive sleep apnea)      PULMONARY   (positive) Asthma      CARDIAC   (positive) Atrial fibrillation with rapid ventricular response (HCC)   (positive) Essential hypertension   (positive) Supraventricular tachycardia, paroxysmal (HCC)      GI   (positive) GERD (gastroesophageal reflux disease)      Other   (positive) Primary osteoarthritis of both hands       Physical Exam    Airway   Mallampati: II  TM distance: >3 FB  Neck ROM: full       Cardiovascular   Rhythm: irregular  Rate: abnormal    (-) murmur     Dental - normal exam           Pulmonary Breath sounds clear to auscultation     Abdominal    Neurological - normal exam                   Anesthesia Plan    ASA 3   ASA physical status 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    Use of blood products discussed with: patient whom consented to blood products.

## 2025-07-24 NOTE — ANESTHESIA POSTPROCEDURE EVALUATION
Patient: Jackie Johnson    Procedure Summary       Date: 07/24/25 Room / Location:  PROCEDURE ROOM / SURGERY [facility]    Anesthesia Start: 1148 Anesthesia Stop: 1211    Procedures:       ECHOCARDIOGRAM, TRANSESOPHAGEAL (Chest)      CARDIOVERSION (Chest) Diagnosis: (Atrial fibrillation with rapid ventricular response)    Surgeons: Tree Marquez M.D. Responsible Provider: Danette Ferreira M.D.    Anesthesia Type: MAC ASA Status: 3            Final Anesthesia Type: MAC  Last vitals  BP   Blood Pressure : 121/64    Temp   36 °C (96.8 °F)    Pulse   (!) 58   Resp   18    SpO2   96 %      Anesthesia Post Evaluation    Patient location during evaluation: PACU  Patient participation: complete - patient participated  Level of consciousness: awake  Pain score: 0    Airway patency: patent  Anesthetic complications: no  Cardiovascular status: hemodynamically stable  Respiratory status: acceptable  Hydration status: acceptable    PONV: none          No notable events documented.     Nurse Pain Score: 0 (NPRS)

## 2025-07-24 NOTE — CARE PLAN
The patient is Stable - Low risk of patient condition declining or worsening    Shift Goals  Clinical Goals: Monitor HR, ANG/Cardioversion 1200, Pain management  Patient Goals: Feel better  Family Goals: ALEXA    Progress made toward(s) clinical / shift goals: Pt is AO x4. Pt has complaints of pain in her shoulder radiating from her back. Pt was administered medication per the MAR and the six rights. VSS. Pt ambulated to the bathroom to void. Pt NPO at midnight. No other needs at this time. Call light within reach, bed in lowest position, and bed alarm is armed.      Problem: Pain - Standard  Goal: Alleviation of pain or a reduction in pain to the patient’s comfort goal  Outcome: Progressing     Problem: Knowledge Deficit - Standard  Goal: Patient and family/care givers will demonstrate understanding of plan of care, disease process/condition, diagnostic tests and medications  Outcome: Progressing       Patient is not progressing towards the following goals:

## 2025-07-24 NOTE — DISCHARGE SUMMARY
Discharge Summary    CHIEF COMPLAINT ON ADMISSION  Chief Complaint   Patient presents with    Chest Pain     Pt. C/O intermittent epigastric pain radiating to chest and shoulders x2-3 days ago       Reason for Admission  Chest Pain; Body Aches     Admission Date  7/23/2025    CODE STATUS  Full Code    HPI & HOSPITAL COURSE  Is a very pleasant 80-year-old female who came in with epigastric and chest pain.  Over the last few months she has been having a lot of stress and drinking about a bottle of wine over the course of a week to help cope with this.  She had also been weaning from her Prilosec and thought her symptoms were related to gastric discomfort.  The pain became so severe she came into the emergency room and is found to be in atrial fibrillation with RVR and unsure as to how long she was in this rhythm.  Initially given Cardizem which did not help, Lopressor helped for a few minutes but heart rate went back up into the 140s.  Cardiology was consulted and recommended medical management and cardioversion inpatient prior to discharge.  Patient underwent cardioversion with Dr. Montanez had successfully went back into sinus rhythm.  She will continue on metoprolol 50 mg twice daily and Xarelto.  Atenolol will be discontinued and she will go back on the cholesterol, triglyceride, PPI that she was taking herself off of.  Alternatives to alcohol consumption for stress management were also discussed.  She did undergo a ultrasound of her liver due to the slightly elevated transaminases and fatty liver change is noted.  At this time she is appropriate to discharge home after she has been weaned from oxygen.  She will follow-up with cardiology nurse practitioner in a couple of weeks and will get a repeat echocardiogram.  Patient agreeable with discharge.    Therefore, she is discharged in good and stable condition to home with close outpatient follow-up.      Discharge Date  7/24/25    FOLLOW UP ITEMS POST  DISCHARGE  PCP and cardiology    DISCHARGE DIAGNOSES  Principal Problem:    Atrial fibrillation with rapid ventricular response (HCC) (POA: Yes)  Active Problems:    Major depressive disorder, recurrent episode (HCC) (POA: Yes)      Overview: Formatting of this note might be different from the original.      Per Darwin Barakat) Jermaine on 9/17/14 - on zoloft      SERTRALINE 50 MG ORAL TAB 90 PRN 9/17/2014 9/17/2015 No Sig - Route: Take       1 tablet by mouth daily for depression      Dis Enrolled from  Depression Care Management program   Pt in Remission         PHQ9  1      Zoloft 50 mg qd      Carine Sears RN BSN   10/23/2014            Enrolled in Depression Care Management program 5-27-14      Carine Sears RN BSN      Depression Care Management.             [phone].    GERD (gastroesophageal reflux disease) (POA: Yes)      Overview: Formatting of this note might be different from the original.      On prilosec 8/08    Essential hypertension (POA: Yes)      Overview: Patient is on atenolol. BP controlled     Bilateral primary osteoarthritis of knee (POA: Yes)    Hyperlipidemia (POA: Yes)      Overview: Tolerating zetia and simvastatin     Asthma (POA: Yes)    Irritable bowel syndrome (POA: Yes)    BMI 32.0-32.9,adult (POA: Yes)    LIZETH (obstructive sleep apnea) (POA: Yes)    Primary insomnia (POA: Yes)    Elevated liver enzymes (POA: Yes)  Resolved Problems:    Obesity (BMI 30-39.9) (POA: Yes)      FOLLOW UP  Future Appointments   Date Time Provider Department Center   8/21/2025 10:15 AM Jessica Valenzuela D.N.P. [facility] None   9/2/2025  3:10 PM Tenisha Jimenez P.A.-C. [facility] None     Saeid Alvarado M.D.  [address] [id number] [phone]    Follow up        MEDICATIONS ON DISCHARGE     Medication List        START taking these medications        Instructions   apixaban 5 MG Tabs  Commonly known as: Eliquis   Take 1 Tablet by mouth 2 times a day.  Dose: 5 mg     metoprolol  tartrate 50 MG Tabs  Commonly known as: Lopressor   Take 1 Tablet by mouth 2 times a day.  Dose: 50 mg            CHANGE how you take these medications        Instructions   simvastatin 40 MG Tabs  Commonly known as: Zocor   Doctor's comments: Please advise Mail order for patient  Take 1 Tablet by mouth every evening.  Dose: 40 mg     traZODone 50 MG Tabs  What changed:   how much to take  how to take this  when to take this  additional instructions  Commonly known as: Desyrel   Doctor's comments:  preferred  Take 1/2 tab (cut the tablet in half) by mouth before bed take the 2nd half if not able to sleep 1 hour after first dose.            CONTINUE taking these medications        Instructions   albuterol 108 (90 Base) MCG/ACT Aers inhalation aerosol   Inhale 1 to 2 puffs by mouth every 4 to 6 hours as needed for wheezing and cough     ezetimibe 10 MG Tabs  Commonly known as: Zetia   Take 10 mg by mouth every day. Stopped 3 weeks ago  Dose: 10 mg     Melatonin 5 MG Chew   Chew 10 mg at bedtime as needed.  Dose: 10 mg     omeprazole 20 MG delayed-release capsule  Commonly known as: PriLOSEC   Doctor's comments: Mail order please and thank you  Take 2 Capsules by mouth every day.  Dose: 40 mg     Qvar RediHaler 40 MCG/ACT inhaler  Generic drug: beclomethasone HFA   Inhale 1 Puff 2 times a day.  Dose: 1 Puff     sertraline 100 MG Tabs  Commonly known as: Zoloft   Doctor's comments: for 360 days.  Take 1 tablet by mouth every day  Dose: 100 mg     triamcinolone acetonide 0.1 % Crea  Commonly known as: Kenalog   Apply to the affected area twice daily.            STOP taking these medications      atenolol 50 MG Tabs  Commonly known as: Tenormin              Allergies  Allergies[1]    DIET  Orders Placed This Encounter   Procedures    Diet Order Diet: Regular     Standing Status:   Standing     Number of Occurrences:   1     Diet::   Regular [1]       ACTIVITY  As tolerated.  Weight bearing as  "tolerated    CONSULTATIONS  Dr Agosto - cardiology    PROCEDURES  ANG Cardioversion - 7/24/25    LABORATORY  Lab Results   Component Value Date    SODIUM 138 07/24/2025    POTASSIUM 4.3 07/24/2025    CHLORIDE 104 07/24/2025    CO2 22 07/24/2025    GLUCOSE 106 (H) 07/24/2025    BUN 13 07/24/2025    CREATININE 0.59 07/24/2025        Lab Results   Component Value Date    WBC 8.2 07/24/2025    HEMOGLOBIN 11.8 (L) 07/24/2025    HEMATOCRIT 36.7 (L) 07/24/2025    PLATELETCT 237 07/24/2025        Total time of the discharge process exceeds 34 minutes.       [1]   Allergies  Allergen Reactions    Morphine      Lowers heart rate    Erythromycin Nausea    Hydrocodone-Acetaminophen     Mastisol Adhesive [Mastisol] Rash    Meperidine Hcl Unspecified     [08/08/09 UNKNOWN, PLEASE VERIFY]    Nickel Hives    Other Drug      \"All narcotics make me nauseated\"     "

## 2025-07-24 NOTE — OR NURSING
"Patient allergies and NPO status verified, home medication reconciliation completed and belongings secured. Surgical site verified with patient. Patient verbalizes understanding of pain scale, expected course of stay and plan of care; patient states verbal understanding at this time. IV access verified.    Pt ambulates SBA to BR and voided. Pt had a moment of unsteady but denies dizziness. Pt does \"feel my heart racing\" when ambulating back from BR 20 feet and  when placed to monitor.   "

## 2025-07-24 NOTE — PROCEDURES
Procedure Performed: ANG [facility]    Procedure physician: Tree Marquez MD, MPH  Assistant: None    CONSENT: I have discussed the risks and benefits of electrical cardioversion as well as the procedure itself, rationale and appropriateness in detail with the patient today. Complications including but not limited to death, stroke, MI, ACLS, aspiration and complications related to anesthesia were explained to the patient. The potential outcomes associated with the procedure were also discussed at length. The patient agrees to proceed.    Pre-procedure diagnosis/Indication: Atrial fibrillation  Post-procedure diagnosis: sinus rhythm    Procedure description: After confirmation of therapeutic anticoagulation and obtaining informed consent, the patient was deeply sedated with propofol by my anesthesia colleague. Thrombus in the left atrial appendage was excluded with ANG. I then delivered a synchronized 200 joule biphasic cardioversion pulse in the anterior-posterior vector which  successfully restored sinus rhythm. The patient awoke from sedation without complication.     Specimens: None  EBL: None  Complications: None    Impression  Successful cardioversion with restoration of sinus rhythm from Atrial fibrillation    Cardiology consult team updated.    Tree Marquez MD, MPH [facility]  Interventional Cardiologist  [facility]

## 2025-07-25 ENCOUNTER — PATIENT OUTREACH (OUTPATIENT)
Dept: MEDICAL GROUP | Facility: LAB | Age: 80
End: 2025-07-25
Payer: MEDICARE

## 2025-07-25 LAB
HAV IGM SERPL QL IA: NORMAL
HBV CORE IGM SER QL: NORMAL
HBV SURFACE AG SER QL: NORMAL
HCV AB SER QL: NORMAL

## 2025-07-25 NOTE — PROGRESS NOTES
Transitional Care Management  TCM Outreach Date and Time: Filed (7/25/2025  9:03 AM)    Discharge Questions  Actual Discharge Date: 07/24/25  Now that you are home, how are you feeling?: Fair (Pt states being weak, breathless and stomach is still bothering her. Pt reports her vitals today were BP- 134/76 and HR- 66)  Did you receive any new prescriptions?: Yes (Start: Eliquis, metoprolol     Stop: Tenormin)  Were you able to get them filled?: Yes  Meds to Bed or Pharmacy filled?: Pharmacy  Do you have any questions about your current medications or new medications (Review Med Rec)?: No  Did you have any durable medical equipment ordered?: No  Do you have a follow up appointment scheduled with your PCP?: No  Was an appointment scheduled for the patient?: Yes  Appointment Date: 08/01/25  Appointment Time: 1120  Any issues or paperwork you wish to discuss with your PCP?: Yes (Please specify) (Possibly getting in for physical therapy d/t weakness after hospital stay)  Are you (patient) able to get to the appointment?: Yes  If Home Health was ordered, have they contacted you (Patient): Not Applicable  Did you have enough support after your last discharge?: Yes  Does this patient qualify for the CCM program?: No    Transitional Care  Number of attempts made to contact patient: 1  Current or previous attempts completed within two business days of discharge? : Yes  Provided education regarding treatment plan, medications, self-management, ADLs?: Yes (ED precautions reviewed, educated pt on BRAT diet)  Has patient completed an Advanced Directive?: No  Is there anything else I can help you with?: No    Discharge Summary  Chief Complaint: Chest Pain  Admitting Diagnosis: Atrial fibrillation with rapid ventricular response  Discharge Diagnosis: Atrial fibrillation with rapid ventricular response      Pt denies other questions or concerns at this time.    Marilyn Montes R.N.

## 2025-07-28 ENCOUNTER — TELEPHONE (OUTPATIENT)
Dept: CARDIOLOGY | Facility: MEDICAL CENTER | Age: 80
End: 2025-07-28
Payer: MEDICARE

## 2025-07-28 DIAGNOSIS — I10 ESSENTIAL HYPERTENSION: ICD-10-CM

## 2025-07-28 DIAGNOSIS — I48.91 ATRIAL FIBRILLATION WITH RAPID VENTRICULAR RESPONSE (HCC): ICD-10-CM

## 2025-07-28 DIAGNOSIS — R74.8 ELEVATED LIVER ENZYMES: Primary | ICD-10-CM

## 2025-07-28 DIAGNOSIS — E78.5 HYPERLIPIDEMIA, UNSPECIFIED HYPERLIPIDEMIA TYPE: ICD-10-CM

## 2025-07-29 PROCEDURE — RXMED WILLOW AMBULATORY MEDICATION CHARGE: Performed by: FAMILY MEDICINE

## 2025-07-29 RX ORDER — OMEPRAZOLE 20 MG/1
40 CAPSULE, DELAYED RELEASE ORAL DAILY
Qty: 200 CAPSULE | Refills: 3 | Status: SHIPPED | OUTPATIENT
Start: 2025-07-29 | End: 2026-09-02

## 2025-07-30 PROCEDURE — RXMED WILLOW AMBULATORY MEDICATION CHARGE: Performed by: STUDENT IN AN ORGANIZED HEALTH CARE EDUCATION/TRAINING PROGRAM

## 2025-08-01 ENCOUNTER — HOSPITAL ENCOUNTER (OUTPATIENT)
Dept: LAB | Facility: MEDICAL CENTER | Age: 80
End: 2025-08-01
Attending: NURSE PRACTITIONER
Payer: MEDICARE

## 2025-08-01 ENCOUNTER — APPOINTMENT (OUTPATIENT)
Dept: MEDICAL GROUP | Facility: LAB | Age: 80
End: 2025-08-01
Payer: MEDICARE

## 2025-08-01 ENCOUNTER — PHARMACY VISIT (OUTPATIENT)
Dept: PHARMACY | Facility: MEDICAL CENTER | Age: 80
End: 2025-08-01
Payer: COMMERCIAL

## 2025-08-01 DIAGNOSIS — I48.91 ATRIAL FIBRILLATION WITH RAPID VENTRICULAR RESPONSE (HCC): ICD-10-CM

## 2025-08-01 DIAGNOSIS — I10 ESSENTIAL HYPERTENSION: ICD-10-CM

## 2025-08-01 DIAGNOSIS — E78.5 HYPERLIPIDEMIA, UNSPECIFIED HYPERLIPIDEMIA TYPE: ICD-10-CM

## 2025-08-01 DIAGNOSIS — R74.8 ELEVATED LIVER ENZYMES: ICD-10-CM

## 2025-08-01 LAB
ALBUMIN SERPL BCP-MCNC: 3.6 G/DL (ref 3.2–4.9)
ALBUMIN/GLOB SERPL: 1.2 G/DL
ALP SERPL-CCNC: 110 U/L (ref 30–99)
ALT SERPL-CCNC: 195 U/L (ref 2–50)
ANION GAP SERPL CALC-SCNC: 13 MMOL/L (ref 7–16)
AST SERPL-CCNC: 73 U/L (ref 12–45)
BILIRUB SERPL-MCNC: 0.5 MG/DL (ref 0.1–1.5)
BUN SERPL-MCNC: 9 MG/DL (ref 8–22)
CALCIUM ALBUM COR SERPL-MCNC: 8.9 MG/DL (ref 8.5–10.5)
CALCIUM SERPL-MCNC: 8.6 MG/DL (ref 8.5–10.5)
CHLORIDE SERPL-SCNC: 100 MMOL/L (ref 96–112)
CO2 SERPL-SCNC: 24 MMOL/L (ref 20–33)
CREAT SERPL-MCNC: 0.71 MG/DL (ref 0.5–1.4)
ERYTHROCYTE [DISTWIDTH] IN BLOOD BY AUTOMATED COUNT: 45.2 FL (ref 35.9–50)
GFR SERPLBLD CREATININE-BSD FMLA CKD-EPI: 87 ML/MIN/1.73 M 2
GLOBULIN SER CALC-MCNC: 2.9 G/DL (ref 1.9–3.5)
GLUCOSE SERPL-MCNC: 87 MG/DL (ref 65–99)
HCT VFR BLD AUTO: 37.2 % (ref 37–47)
HGB BLD-MCNC: 12 G/DL (ref 12–16)
MCH RBC QN AUTO: 30.2 PG (ref 27–33)
MCHC RBC AUTO-ENTMCNC: 32.3 G/DL (ref 32.2–35.5)
MCV RBC AUTO: 93.7 FL (ref 81.4–97.8)
PLATELET # BLD AUTO: 326 K/UL (ref 164–446)
PMV BLD AUTO: 10.1 FL (ref 9–12.9)
POTASSIUM SERPL-SCNC: 4.2 MMOL/L (ref 3.6–5.5)
PROT SERPL-MCNC: 6.5 G/DL (ref 6–8.2)
RBC # BLD AUTO: 3.97 M/UL (ref 4.2–5.4)
SODIUM SERPL-SCNC: 137 MMOL/L (ref 135–145)
WBC # BLD AUTO: 10.7 K/UL (ref 4.8–10.8)

## 2025-08-01 PROCEDURE — 85027 COMPLETE CBC AUTOMATED: CPT

## 2025-08-01 PROCEDURE — 36415 COLL VENOUS BLD VENIPUNCTURE: CPT

## 2025-08-01 PROCEDURE — 80053 COMPREHEN METABOLIC PANEL: CPT

## 2025-08-01 ASSESSMENT — FIBROSIS 4 INDEX: FIB4 SCORE: 2.17

## 2025-08-04 ENCOUNTER — RESULTS FOLLOW-UP (OUTPATIENT)
Dept: CARDIOLOGY | Facility: MEDICAL CENTER | Age: 80
End: 2025-08-04
Payer: MEDICARE

## 2025-08-04 DIAGNOSIS — R74.8 ELEVATED LIVER ENZYMES: Primary | ICD-10-CM

## 2025-08-04 DIAGNOSIS — R74.8 ELEVATED ALKALINE PHOSPHATASE LEVEL: ICD-10-CM

## 2025-08-13 ENCOUNTER — HOSPITAL ENCOUNTER (OUTPATIENT)
Dept: LAB | Facility: MEDICAL CENTER | Age: 80
End: 2025-08-13
Attending: NURSE PRACTITIONER
Payer: MEDICARE

## 2025-08-13 DIAGNOSIS — R74.8 ELEVATED ALKALINE PHOSPHATASE LEVEL: ICD-10-CM

## 2025-08-13 DIAGNOSIS — R74.8 ELEVATED LIVER ENZYMES: ICD-10-CM

## 2025-08-13 LAB
ALBUMIN SERPL BCP-MCNC: 3.8 G/DL (ref 3.2–4.9)
ALBUMIN/GLOB SERPL: 1.4 G/DL
ALP SERPL-CCNC: 76 U/L (ref 30–99)
ALT SERPL-CCNC: 30 U/L (ref 2–50)
ANION GAP SERPL CALC-SCNC: 10 MMOL/L (ref 7–16)
AST SERPL-CCNC: 23 U/L (ref 12–45)
BILIRUB SERPL-MCNC: 0.3 MG/DL (ref 0.1–1.5)
BUN SERPL-MCNC: 10 MG/DL (ref 8–22)
CALCIUM ALBUM COR SERPL-MCNC: 9.4 MG/DL (ref 8.5–10.5)
CALCIUM SERPL-MCNC: 9.2 MG/DL (ref 8.5–10.5)
CHLORIDE SERPL-SCNC: 100 MMOL/L (ref 96–112)
CK SERPL-CCNC: 21 U/L (ref 0–154)
CO2 SERPL-SCNC: 26 MMOL/L (ref 20–33)
CREAT SERPL-MCNC: 0.68 MG/DL (ref 0.5–1.4)
GFR SERPLBLD CREATININE-BSD FMLA CKD-EPI: 89 ML/MIN/1.73 M 2
GLOBULIN SER CALC-MCNC: 2.8 G/DL (ref 1.9–3.5)
GLUCOSE SERPL-MCNC: 80 MG/DL (ref 65–99)
POTASSIUM SERPL-SCNC: 4.5 MMOL/L (ref 3.6–5.5)
PROT SERPL-MCNC: 6.6 G/DL (ref 6–8.2)
SODIUM SERPL-SCNC: 136 MMOL/L (ref 135–145)

## 2025-08-13 PROCEDURE — 36415 COLL VENOUS BLD VENIPUNCTURE: CPT

## 2025-08-13 PROCEDURE — 80053 COMPREHEN METABOLIC PANEL: CPT

## 2025-08-13 PROCEDURE — 82550 ASSAY OF CK (CPK): CPT

## 2025-08-14 ENCOUNTER — RESULTS FOLLOW-UP (OUTPATIENT)
Dept: CARDIOLOGY | Facility: MEDICAL CENTER | Age: 80
End: 2025-08-14
Payer: MEDICARE

## 2025-08-18 ENCOUNTER — PHARMACY VISIT (OUTPATIENT)
Dept: PHARMACY | Facility: MEDICAL CENTER | Age: 80
End: 2025-08-18
Payer: COMMERCIAL

## 2025-08-18 ENCOUNTER — HOSPITAL ENCOUNTER (OUTPATIENT)
Dept: CARDIOLOGY | Facility: MEDICAL CENTER | Age: 80
End: 2025-08-18
Attending: NURSE PRACTITIONER
Payer: MEDICARE

## 2025-08-18 ENCOUNTER — HOSPITAL ENCOUNTER (OUTPATIENT)
Dept: RADIOLOGY | Facility: MEDICAL CENTER | Age: 80
End: 2025-08-18
Attending: FAMILY MEDICINE
Payer: MEDICARE

## 2025-08-18 DIAGNOSIS — I48.91 ATRIAL FIBRILLATION WITH RAPID VENTRICULAR RESPONSE (HCC): ICD-10-CM

## 2025-08-18 DIAGNOSIS — K59.09 CHRONIC CONSTIPATION: ICD-10-CM

## 2025-08-18 LAB
LV EJECT FRACT  99904: 65
LV EJECT FRACT MOD 2C 99903: 64.82
LV EJECT FRACT MOD 4C 99902: 64.88
LV EJECT FRACT MOD BP 99901: 65.44

## 2025-08-18 PROCEDURE — 93306 TTE W/DOPPLER COMPLETE: CPT

## 2025-08-18 PROCEDURE — RXMED WILLOW AMBULATORY MEDICATION CHARGE: Performed by: INTERNAL MEDICINE

## 2025-08-18 PROCEDURE — 93306 TTE W/DOPPLER COMPLETE: CPT | Mod: 26 | Performed by: INTERNAL MEDICINE

## 2025-08-18 PROCEDURE — 74018 RADEX ABDOMEN 1 VIEW: CPT

## 2025-08-19 ENCOUNTER — RESULTS FOLLOW-UP (OUTPATIENT)
Dept: CARDIOLOGY | Facility: MEDICAL CENTER | Age: 80
End: 2025-08-19
Payer: MEDICARE

## 2025-08-21 ENCOUNTER — APPOINTMENT (OUTPATIENT)
Dept: MEDICAL GROUP | Facility: LAB | Age: 80
End: 2025-08-21
Payer: MEDICARE

## 2025-08-21 ENCOUNTER — SPECIALTY PHARMACY (OUTPATIENT)
Dept: CARDIOLOGY | Facility: MEDICAL CENTER | Age: 80
End: 2025-08-21

## 2025-08-21 ENCOUNTER — OFFICE VISIT (OUTPATIENT)
Dept: CARDIOLOGY | Facility: MEDICAL CENTER | Age: 80
End: 2025-08-21
Attending: NURSE PRACTITIONER
Payer: MEDICARE

## 2025-08-21 VITALS
RESPIRATION RATE: 16 BRPM | SYSTOLIC BLOOD PRESSURE: 114 MMHG | OXYGEN SATURATION: 97 % | HEIGHT: 64 IN | WEIGHT: 188 LBS | BODY MASS INDEX: 32.1 KG/M2 | HEART RATE: 57 BPM | DIASTOLIC BLOOD PRESSURE: 58 MMHG

## 2025-08-21 DIAGNOSIS — I47.10 SUPRAVENTRICULAR TACHYCARDIA, PAROXYSMAL (HCC): Primary | ICD-10-CM

## 2025-08-21 DIAGNOSIS — E78.5 DYSLIPIDEMIA: ICD-10-CM

## 2025-08-21 DIAGNOSIS — I48.91 ATRIAL FIBRILLATION WITH RAPID VENTRICULAR RESPONSE (HCC): ICD-10-CM

## 2025-08-21 DIAGNOSIS — D68.318 CIRCULATING ANTICOAGULANTS (HCC): ICD-10-CM

## 2025-08-21 LAB — EKG IMPRESSION: NORMAL

## 2025-08-21 PROCEDURE — 99213 OFFICE O/P EST LOW 20 MIN: CPT | Performed by: NURSE PRACTITIONER

## 2025-08-21 PROCEDURE — 93005 ELECTROCARDIOGRAM TRACING: CPT | Mod: TC | Performed by: NURSE PRACTITIONER

## 2025-08-21 PROCEDURE — 93010 ELECTROCARDIOGRAM REPORT: CPT | Performed by: INTERNAL MEDICINE

## 2025-08-21 RX ORDER — METHYLPREDNISOLONE 4 MG/1
TABLET ORAL
COMMUNITY
Start: 2025-08-20

## 2025-08-21 ASSESSMENT — ENCOUNTER SYMPTOMS
COUGH: 0
DIZZINESS: 0
SHORTNESS OF BREATH: 0
NAUSEA: 0
NERVOUS/ANXIOUS: 0
PALPITATIONS: 0
BACK PAIN: 1
DEPRESSION: 1
HEADACHES: 0

## 2025-08-21 ASSESSMENT — FIBROSIS 4 INDEX: FIB4 SCORE: 1

## 2025-08-23 ENCOUNTER — TELEPHONE (OUTPATIENT)
Dept: CARDIOLOGY | Facility: MEDICAL CENTER | Age: 80
End: 2025-08-23
Payer: MEDICARE

## 2025-08-25 ENCOUNTER — NON-PROVIDER VISIT (OUTPATIENT)
Facility: MEDICAL CENTER | Age: 80
End: 2025-08-25
Attending: NURSE PRACTITIONER
Payer: MEDICARE

## 2025-08-25 ENCOUNTER — TELEPHONE (OUTPATIENT)
Dept: CARDIOLOGY | Facility: MEDICAL CENTER | Age: 80
End: 2025-08-25

## 2025-08-25 ENCOUNTER — HOSPITAL ENCOUNTER (OUTPATIENT)
Facility: MEDICAL CENTER | Age: 80
End: 2025-08-25
Attending: NURSE PRACTITIONER
Payer: MEDICARE

## 2025-08-25 ENCOUNTER — RESULTS FOLLOW-UP (OUTPATIENT)
Dept: CARDIOLOGY | Facility: MEDICAL CENTER | Age: 80
End: 2025-08-25

## 2025-08-25 DIAGNOSIS — E78.5 DYSLIPIDEMIA: ICD-10-CM

## 2025-08-25 DIAGNOSIS — I51.89 DIASTOLIC DYSFUNCTION: Primary | ICD-10-CM

## 2025-08-25 DIAGNOSIS — I48.91 ATRIAL FIBRILLATION WITH RAPID VENTRICULAR RESPONSE (HCC): ICD-10-CM

## 2025-08-25 LAB
CHOLEST SERPL-MCNC: 164 MG/DL (ref 100–199)
FASTING STATUS PATIENT QL REPORTED: NORMAL
HDLC SERPL-MCNC: 54 MG/DL
LDLC SERPL CALC-MCNC: 88 MG/DL
TRIGL SERPL-MCNC: 110 MG/DL (ref 0–149)

## 2025-08-25 PROCEDURE — 36415 COLL VENOUS BLD VENIPUNCTURE: CPT

## 2025-08-25 PROCEDURE — 93246 EXT ECG>7D<15D RECORDING: CPT

## 2025-08-25 PROCEDURE — 80061 LIPID PANEL: CPT

## 2025-08-26 ENCOUNTER — TELEPHONE (OUTPATIENT)
Dept: CARDIOLOGY | Facility: MEDICAL CENTER | Age: 80
End: 2025-08-26
Payer: MEDICARE

## 2025-08-26 DIAGNOSIS — E78.1 HYPERTRIGLYCERIDEMIA: ICD-10-CM

## 2025-08-26 DIAGNOSIS — I48.91 ATRIAL FIBRILLATION WITH RAPID VENTRICULAR RESPONSE (HCC): ICD-10-CM

## 2025-08-26 DIAGNOSIS — I10 ESSENTIAL HYPERTENSION: ICD-10-CM

## 2025-08-26 DIAGNOSIS — E78.5 DYSLIPIDEMIA: Primary | ICD-10-CM

## 2025-08-26 RX ORDER — EZETIMIBE 10 MG/1
10 TABLET ORAL DAILY
Qty: 100 TABLET | Refills: 2 | Status: SHIPPED | OUTPATIENT
Start: 2025-08-26 | End: 2025-08-26

## 2025-08-26 RX ORDER — EZETIMIBE 10 MG/1
10 TABLET ORAL DAILY
Qty: 100 TABLET | Refills: 2 | Status: SHIPPED | OUTPATIENT
Start: 2025-08-26

## 2025-08-26 RX ORDER — EZETIMIBE 10 MG/1
10 TABLET ORAL DAILY
Qty: 100 TABLET | Refills: 2 | Status: CANCELLED | OUTPATIENT
Start: 2025-08-26

## 2025-08-27 ENCOUNTER — TELEPHONE (OUTPATIENT)
Dept: CARDIOLOGY | Facility: MEDICAL CENTER | Age: 80
End: 2025-08-27
Payer: MEDICARE

## 2025-08-27 ENCOUNTER — PATIENT MESSAGE (OUTPATIENT)
Dept: CARDIOLOGY | Facility: MEDICAL CENTER | Age: 80
End: 2025-08-27
Payer: MEDICARE

## 2025-08-28 ENCOUNTER — TELEPHONE (OUTPATIENT)
Dept: CARDIOLOGY | Facility: MEDICAL CENTER | Age: 80
End: 2025-08-28
Payer: MEDICARE

## (undated) DEVICE — SENSOR OXIMETER ADULT SPO2 RD SET (20EA/BX)

## (undated) DEVICE — TOWEL STOP TIMEOUT SAFETY FLAG (40EA/CA)

## (undated) DEVICE — ELECTRODE DUAL RETURN W/ CORD - (50/PK)

## (undated) DEVICE — KIT  I.V. START (100EA/CA)

## (undated) DEVICE — GLOVE BIOGEL SZ 8 SURGICAL PF LTX - (50PR/BX 4BX/CA)

## (undated) DEVICE — COVER LIGHT HANDLE FLEXIBLE - SOFT (2EA/PK 80PK/CA)

## (undated) DEVICE — GOWN WARMING STANDARD FLEX - (30/CA)

## (undated) DEVICE — TUBING CLEARLINK DUO-VENT - C-FLO (48EA/CA)

## (undated) DEVICE — CANNULA O2 COMFORT SOFT EAR ADULT 7 FT TUBING (50/CA)

## (undated) DEVICE — LACTATED RINGERS INJ 1000 ML - (14EA/CA 60CA/PF)